# Patient Record
Sex: MALE | Race: WHITE | NOT HISPANIC OR LATINO | Employment: FULL TIME | URBAN - NONMETROPOLITAN AREA
[De-identification: names, ages, dates, MRNs, and addresses within clinical notes are randomized per-mention and may not be internally consistent; named-entity substitution may affect disease eponyms.]

---

## 2019-04-01 ENCOUNTER — NON-PROVIDER VISIT (OUTPATIENT)
Dept: URGENT CARE | Facility: PHYSICIAN GROUP | Age: 39
End: 2019-04-01

## 2019-04-01 DIAGNOSIS — Z02.1 PRE-EMPLOYMENT DRUG SCREENING: ICD-10-CM

## 2019-04-01 LAB
BREATH ALCOHOL COMMENT: NORMAL
POC BREATHALIZER: 0 PERCENT (ref 0–0.01)

## 2019-04-01 PROCEDURE — 82075 ASSAY OF BREATH ETHANOL: CPT | Performed by: PHYSICIAN ASSISTANT

## 2019-04-01 PROCEDURE — 8907 PR URINE COLLECT ONLY: Performed by: PHYSICIAN ASSISTANT

## 2019-12-05 ENCOUNTER — TELEPHONE (OUTPATIENT)
Dept: SCHEDULING | Facility: IMAGING CENTER | Age: 39
End: 2019-12-05

## 2019-12-06 ENCOUNTER — OFFICE VISIT (OUTPATIENT)
Dept: MEDICAL GROUP | Facility: PHYSICIAN GROUP | Age: 39
End: 2019-12-06
Payer: COMMERCIAL

## 2019-12-06 VITALS
HEIGHT: 72 IN | DIASTOLIC BLOOD PRESSURE: 100 MMHG | OXYGEN SATURATION: 99 % | BODY MASS INDEX: 31.15 KG/M2 | TEMPERATURE: 98.2 F | SYSTOLIC BLOOD PRESSURE: 120 MMHG | WEIGHT: 230 LBS | HEART RATE: 77 BPM

## 2019-12-06 DIAGNOSIS — Z00.00 ANNUAL PHYSICAL EXAM: ICD-10-CM

## 2019-12-06 DIAGNOSIS — I82.492 ACUTE DEEP VEIN THROMBOSIS (DVT) OF OTHER SPECIFIED VEIN OF LEFT LOWER EXTREMITY (HCC): ICD-10-CM

## 2019-12-06 DIAGNOSIS — R91.8 MULTIPLE LUNG NODULES ON CT: ICD-10-CM

## 2019-12-06 PROBLEM — F32.A ANXIETY AND DEPRESSION: Status: ACTIVE | Noted: 2019-12-06

## 2019-12-06 PROBLEM — I82.409 DVT (DEEP VENOUS THROMBOSIS) (HCC): Status: ACTIVE | Noted: 2019-12-06

## 2019-12-06 PROBLEM — F41.9 ANXIETY AND DEPRESSION: Status: ACTIVE | Noted: 2019-12-06

## 2019-12-06 PROBLEM — G47.00 INSOMNIA: Status: ACTIVE | Noted: 2019-12-06

## 2019-12-06 PROCEDURE — 99204 OFFICE O/P NEW MOD 45 MIN: CPT | Performed by: NURSE PRACTITIONER

## 2019-12-06 RX ORDER — APIXABAN 5 MG/1
TABLET, FILM COATED ORAL
Refills: 0 | COMMUNITY
Start: 2019-12-03 | End: 2019-12-13 | Stop reason: SINTOL

## 2019-12-06 SDOH — HEALTH STABILITY: MENTAL HEALTH: HOW OFTEN DO YOU HAVE A DRINK CONTAINING ALCOHOL?: MONTHLY OR LESS

## 2019-12-06 ASSESSMENT — PATIENT HEALTH QUESTIONNAIRE - PHQ9
5. POOR APPETITE OR OVEREATING: 1 - SEVERAL DAYS
SUM OF ALL RESPONSES TO PHQ QUESTIONS 1-9: 18
CLINICAL INTERPRETATION OF PHQ2 SCORE: 6

## 2019-12-06 NOTE — LETTER
December 6, 2019       Patient: Robert Dahl   YOB: 1980   Date of Visit: 12/6/2019         To Whom It May Concern:    It is my medical opinion that Robert Dahl return to light duty with the following restrictions: he may not lift over 20 lbs and is to remain light duty until he is seen by the specialist.    If you have any questions or concerns, please don't hesitate to call [phone]          Sincerely,          RYLAND Martell.  Electronically Signed

## 2019-12-07 NOTE — ASSESSMENT & PLAN NOTE
New problem to this provider.  He presented to [facility] in [place], reviewed venous Dopplex US, showed left leg DVT: Partial thrombus in the left superficial femoral vein, near a valve.  Negative findings in the right leg.  Patient was placed on Eliquis 5 mg (10 mg BID x 7 days,  5mg BID x 23 days).  Patient reports tenderness in bilateral legs and left foot metatarsals.  Today patient denies chest pain, coughing, dyspnea, no hematuria, hemoptysis, blood in stool.

## 2019-12-07 NOTE — PROGRESS NOTES
Chief Complaint   Patient presents with   • Establish Care     Pt sts he has pulmorary nodules on his lungs and deep vein thrombosis. He was seen at [facility] on 12/03/2019.       HISTORY OF PRESENT ILLNESS: Patient is a 39 y.o. male, new  patient who presents today to discuss medical problems as listed below:    Health Maintenance:  COMPLETED.    Multiple lung nodules on CT  New problem.  CT of the chest with and without was done at [facility] in [place] to r/o PE. CP and + for DVT.  CT was negative for PE, incidental finding of pulmonary solid nodule in left lower lobe measuring 4.3 and 5 mm which are noncalcified.  Patient denies fevers, night sweats, unplanned weight loss, personal history of malignancy, blood in stool or urine, no coughing, no blood in sputum.  He denies chest pain, chest tightness pressure, no shortness of breath.  Risk factors include smoking half a pack for 5 years, quit smoking in 2014.  Currently waping marijuana.     DVT (deep venous thrombosis) (HCC)  New problem to this provider.  He presented to [facility] in [place], reviewed venous Dopplex US, showed left leg DVT: Partial thrombus in the left superficial femoral vein, near a valve.  Negative findings in the right leg.  Patient was placed on Eliquis 5 mg (10 mg BID x 7 days,  5mg BID x 23 days).  Patient reports tenderness in bilateral legs and left foot metatarsals.  Today patient denies chest pain, coughing, dyspnea, no hematuria, hemoptysis, blood in stool.      Patient Active Problem List    Diagnosis Date Noted   • DVT (deep venous thrombosis) (HCC) 12/06/2019   • Insomnia 12/06/2019   • Anxiety and depression 12/06/2019   • Annual physical exam 12/06/2019   • Multiple lung nodules on CT 12/06/2019        Allergies: Patient has no allergy information on record.    Current Outpatient Medications   Medication Sig Dispense Refill   • ELIQUIS 5 MG Tab   0     No current facility-administered medications for this visit.         Social History     Tobacco Use   • Smoking status: Former Smoker     Packs/day: 0.50     Years: 5.00     Pack years: 2.50     Last attempt to quit: 2014     Years since quittin.0   • Smokeless tobacco: Never Used   • Tobacco comment: quit    Substance Use Topics   • Alcohol use: Yes     Frequency: Monthly or less   • Drug use: Yes     Frequency: 7.0 times per week     Types: Marijuana     Comment: CBD, inhaler     Social History     Patient does not qualify to have social determinant information on file (likely too young).   Social History Narrative   • Not on file       Family History   Problem Relation Age of Onset   • Heart Disease Mother    • Hypertension Mother    • Heart Disease Father    • Diabetes Father        Allergies, past medical history, past surgical history, family history, social history reviewed and updated.    Review of Systems:      - Constitutional: Negative for fever, chills, unexpected weight change, and fatigue/generalized weakness.     - HEENT: Negative for headaches, vision changes, hearing changes, ear pain, ear discharge, rhinorrhea, sinus congestion, sore throat, and neck pain.      - Respiratory: Negative for cough, sputum production, chest congestion, dyspnea, wheezing, and crackles.      - Cardiovascular: Negative for chest pain, palpitations, orthopnea, and bilateral lower extremity edema.     - Gastrointestinal: Negative for heartburn, nausea, vomiting, abdominal pain, hematochezia, melena, diarrhea, constipation, and greasy/foul-smelling stools.     - Genitourinary: Negative for dysuria, polyuria, hematuria, pyuria, urinary urgency, and urinary incontinence.    - Musculoskeletal: Tenderness in bilateral legs and left foot metatarsals.  Negative for myalgias, back pain, and joint pain.     - Skin: Negative for rash, itching, cyanotic skin color change.     - Neurological: Negative for dizziness, tingling, tremors, focal sensory deficit, focal weakness and  headaches.     - Endo/Heme/Allergies: Does not bruise/bleed easily.     - Psychiatric/Behavioral: Negative for depression, suicidal/homicidal ideation and memory loss.      All other systems reviewed and are negative    Exam:    /100 (BP Location: Right arm, Patient Position: Sitting, BP Cuff Size: Large adult)   Pulse 77   Temp 36.8 °C (98.2 °F) (Temporal)   Ht 1.829 m (6')   Wt 104.3 kg (230 lb)   SpO2 99%   BMI 31.19 kg/m²  Body mass index is 31.19 kg/m².    Physical Exam:  Constitutional: Well-developed and well-nourished. Not diaphoretic. No distress.   Skin: Skin is warm and dry. No rash noted.  Head: Atraumatic without lesions.  Eyes: Conjunctivae and extraocular motions are normal. Pupils are equal, round, and reactive to light. No scleral icterus.   Ears:  External ears unremarkable. Tympanic membranes clear and intact.  Nose: Nares patent. Septum midline. Turbinates without erythema nor edema. No discharge.   Mouth/Throat: Dentition is normal. Tongue normal. Oropharynx is clear and moist. Posterior pharynx without erythema or exudates.  Neck: Supple, trachea midline. Normal range of motion. No thyromegaly present. No lymphadenopathy--cervical or supraclavicular.  Cardiovascular: Regular rate and rhythm, S1 and S2 without murmur, rubs, or gallops.    Chest: Effort normal. Clear to auscultation throughout. No adventitious sounds. No CVA tenderness.  Abdomen: Soft, non tender, and without distention. Active bowel sounds in all four quadrants. No rebound, guarding, masses or HSM.  : Negative for dysuria, polyuria, hematuria, pyuria, urinary urgency, and urinary incontinence.  Extremities: No cyanosis, clubbing, erythema, nor edema. Distal pulses intact and symmetric.   Musculoskeletal: All major joints AROM full in all directions without pain.  Neurological: Alert and oriented x 3. DTRs 2+/3 and symmetric. No cranial nerve deficit. 5/5 myotomes. Sensation intact. Negative Rhomberg.  Psychiatric:   Behavior, mood, and affect are appropriate.    Assessment/Plan:  1. Annual physical exam  - CBC WITH DIFFERENTIAL; Future  - Comp Metabolic Panel; Future  - FREE THYROXINE; Future  - Lipid Profile; Future  - HEMOGLOBIN A1C; Future  - VITAMIN D,25 HYDROXY; Future  - TSH; Future    2. Acute deep vein thrombosis (DVT) of other specified vein of left lower extremity (HCC)  Uncontrolled, stable.  Patient to follow-up with vascular anticoagulation clinic for monitoring.  - REFERRAL TO VASCULAR MEDICINE Reason for Referral? Coagulation Disorders  - REFERRAL TO ANTICOAGULATION MONITORING    3. Multiple lung nodules on CT  Patient to follow-up with lung nodule clinic for further monitoring.  - REFERRAL TO LUNG NODULE CLINIC    Discussed with patient possible alternative diagnoses, pt is to take all medications as prescribed. If symptoms persist FU w/PCP, if symptoms worsen go to emergency room. If experiencing any side effects from prescribed medications reports to the office immediately or go to emergency room.  Reviewed indication, dosage, usage and potential adverse effects of prescribed medications. Reviewed risks and benefits of treatment plan. Patient verbalizes understanding of all instruction and verbally agrees to plan.    Return if symptoms worsen or fail to improve.

## 2019-12-07 NOTE — ASSESSMENT & PLAN NOTE
New problem.  CT of the chest with and without was done at [facility] in [place] to r/o PE. CP and + for DVT.  CT was negative for PE, incidental finding of pulmonary solid nodule in left lower lobe measuring 4.3 and 5 mm which are noncalcified.  Patient denies fevers, night sweats, unplanned weight loss, personal history of malignancy, blood in stool or urine, no coughing, no blood in sputum.  He denies chest pain, chest tightness pressure, no shortness of breath.  Risk factors include smoking half a pack for 5 years, quit smoking in 2014.  Currently waping marijuana.

## 2019-12-11 ENCOUNTER — TELEPHONE (OUTPATIENT)
Dept: VASCULAR LAB | Facility: MEDICAL CENTER | Age: 39
End: 2019-12-11

## 2019-12-11 NOTE — TELEPHONE ENCOUNTER
[facility]    Pt reports he is not able to return to work until he is seen by our clinic.  Reports side effects from Eliquis of nausea and vomiting. Our provider will discuss side effects of the medication and see if a transition to a different OAC is appropriate.    Appointment made for 12/13/19    Hima Garnica, PharmD

## 2019-12-13 ENCOUNTER — HOSPITAL ENCOUNTER (OUTPATIENT)
Dept: LAB | Facility: MEDICAL CENTER | Age: 39
End: 2019-12-13
Attending: NURSE PRACTITIONER
Payer: COMMERCIAL

## 2019-12-13 ENCOUNTER — ANTICOAGULATION VISIT (OUTPATIENT)
Dept: MEDICAL GROUP | Facility: PHYSICIAN GROUP | Age: 39
End: 2019-12-13
Payer: COMMERCIAL

## 2019-12-13 VITALS — SYSTOLIC BLOOD PRESSURE: 150 MMHG | DIASTOLIC BLOOD PRESSURE: 87 MMHG | HEART RATE: 80 BPM

## 2019-12-13 DIAGNOSIS — I82.492 ACUTE DEEP VEIN THROMBOSIS (DVT) OF OTHER SPECIFIED VEIN OF LEFT LOWER EXTREMITY (HCC): ICD-10-CM

## 2019-12-13 DIAGNOSIS — Z00.00 ANNUAL PHYSICAL EXAM: ICD-10-CM

## 2019-12-13 LAB
ALBUMIN SERPL BCP-MCNC: 4.4 G/DL (ref 3.2–4.9)
ALBUMIN/GLOB SERPL: 1.7 G/DL
ALP SERPL-CCNC: 71 U/L (ref 30–99)
ALT SERPL-CCNC: 52 U/L (ref 2–50)
ANION GAP SERPL CALC-SCNC: 7 MMOL/L (ref 0–11.9)
AST SERPL-CCNC: 28 U/L (ref 12–45)
BASOPHILS # BLD AUTO: 0.8 % (ref 0–1.8)
BASOPHILS # BLD: 0.07 K/UL (ref 0–0.12)
BILIRUB SERPL-MCNC: 0.5 MG/DL (ref 0.1–1.5)
BUN SERPL-MCNC: 9 MG/DL (ref 8–22)
CALCIUM SERPL-MCNC: 9.3 MG/DL (ref 8.5–10.5)
CHLORIDE SERPL-SCNC: 103 MMOL/L (ref 96–112)
CHOLEST SERPL-MCNC: 172 MG/DL (ref 100–199)
CO2 SERPL-SCNC: 30 MMOL/L (ref 20–33)
CREAT SERPL-MCNC: 1.02 MG/DL (ref 0.5–1.4)
EOSINOPHIL # BLD AUTO: 0.1 K/UL (ref 0–0.51)
EOSINOPHIL NFR BLD: 1.1 % (ref 0–6.9)
ERYTHROCYTE [DISTWIDTH] IN BLOOD BY AUTOMATED COUNT: 43.5 FL (ref 35.9–50)
FASTING STATUS PATIENT QL REPORTED: NORMAL
GLOBULIN SER CALC-MCNC: 2.6 G/DL (ref 1.9–3.5)
GLUCOSE BLD-MCNC: 86 MG/DL (ref 70–100)
GLUCOSE SERPL-MCNC: 84 MG/DL (ref 65–99)
HCT VFR BLD AUTO: 52.5 % (ref 42–52)
HDLC SERPL-MCNC: 30 MG/DL
HGB BLD-MCNC: 18 G/DL (ref 14–18)
IMM GRANULOCYTES # BLD AUTO: 0.04 K/UL (ref 0–0.11)
IMM GRANULOCYTES NFR BLD AUTO: 0.4 % (ref 0–0.9)
LDLC SERPL CALC-MCNC: ABNORMAL MG/DL
LYMPHOCYTES # BLD AUTO: 2.49 K/UL (ref 1–4.8)
LYMPHOCYTES NFR BLD: 27.7 % (ref 22–41)
MCH RBC QN AUTO: 31.9 PG (ref 27–33)
MCHC RBC AUTO-ENTMCNC: 34.3 G/DL (ref 33.7–35.3)
MCV RBC AUTO: 92.9 FL (ref 81.4–97.8)
MONOCYTES # BLD AUTO: 0.82 K/UL (ref 0–0.85)
MONOCYTES NFR BLD AUTO: 9.1 % (ref 0–13.4)
NEUTROPHILS # BLD AUTO: 5.47 K/UL (ref 1.82–7.42)
NEUTROPHILS NFR BLD: 60.9 % (ref 44–72)
NRBC # BLD AUTO: 0 K/UL
NRBC BLD-RTO: 0 /100 WBC
PLATELET # BLD AUTO: 298 K/UL (ref 164–446)
PMV BLD AUTO: 10.2 FL (ref 9–12.9)
POTASSIUM SERPL-SCNC: 3.8 MMOL/L (ref 3.6–5.5)
PROT SERPL-MCNC: 7 G/DL (ref 6–8.2)
RBC # BLD AUTO: 5.65 M/UL (ref 4.7–6.1)
SODIUM SERPL-SCNC: 140 MMOL/L (ref 135–145)
TRIGL SERPL-MCNC: 417 MG/DL (ref 0–149)
WBC # BLD AUTO: 9 K/UL (ref 4.8–10.8)

## 2019-12-13 PROCEDURE — 36415 COLL VENOUS BLD VENIPUNCTURE: CPT

## 2019-12-13 PROCEDURE — 82962 GLUCOSE BLOOD TEST: CPT | Performed by: NURSE PRACTITIONER

## 2019-12-13 PROCEDURE — 80061 LIPID PANEL: CPT

## 2019-12-13 PROCEDURE — 82306 VITAMIN D 25 HYDROXY: CPT

## 2019-12-13 PROCEDURE — 84439 ASSAY OF FREE THYROXINE: CPT

## 2019-12-13 PROCEDURE — 85025 COMPLETE CBC W/AUTO DIFF WBC: CPT

## 2019-12-13 PROCEDURE — 80053 COMPREHEN METABOLIC PANEL: CPT

## 2019-12-13 PROCEDURE — 84443 ASSAY THYROID STIM HORMONE: CPT

## 2019-12-13 PROCEDURE — 83036 HEMOGLOBIN GLYCOSYLATED A1C: CPT

## 2019-12-13 PROCEDURE — 99211 OFF/OP EST MAY X REQ PHY/QHP: CPT | Performed by: NURSE PRACTITIONER

## 2019-12-13 NOTE — Clinical Note
I'm asking for forgiveness on this, but I didn't know what to do for this patient and what to do regarding his symptoms. See my note. I saw it happen in the office.  Epic came up at the end of my visit with him, however this felt way above my comfort level. Please forgive me for booking him into your schedule.

## 2019-12-14 LAB
25(OH)D3 SERPL-MCNC: 16 NG/ML (ref 30–100)
T4 FREE SERPL-MCNC: 0.87 NG/DL (ref 0.53–1.43)
TSH SERPL DL<=0.005 MIU/L-ACNC: 3.76 UIU/ML (ref 0.38–5.33)

## 2019-12-14 NOTE — PROGRESS NOTES
"LATE ENTRY due to Epic being down.        Target end date:TBD     Indication: DVT     Drug: Started on Eliquis 10mg BID on 12/3/19, stepped down to 5mg BID on    12/11/19 12/03/19 - [facility] Records        Pt states no family hx VTE. Denies any surgery, accidents, or prolonged immobility that may have caused DVT. Denies long car rides or flights. Pt works as a heavy  out at the mines.     Pt states that the Eliquis is giving him \"side effects\". I was able to witness these side effects in office. Pt started to turn red and hot, starting with the extremities and then evolved to all over. Pt had tremor in his hand. Then as his flushing was continuing to spread he started to get nausea. Checked BP during this episode manually it was ~150/87 p=80. Episode lasted about 10 minutes, pt sat with his head down in the recovery position and then his color turned back normal, felt normal. Declined to be seen by Urgent Care. Pt did not vomit or dry heave in office, however he states that does normally happen and that this episode was \"mild\".     States that he got sent home from work after he returned on light duty, was sweeping the floor, had any episode and sat down and passed out. Also reports getting pain in his legs and chest.     Check BG = 86 in office during episode. Pt had gone to the lab prior to my appt, but as the system was down there was nothing pending. Appears that PCP ordered baseline labs.     Adherence with DOAC Therapy   Pt has NOT missed any doses in the average week    Bleeding Risk Assessment     Denies -  Epistaxis   Pt denies any excessive or unusual bleeding/hematomas.  Pt denies any GI bleeds or hematemesis.  Pt denies any concerning daily headache or sub dural hematoma symptoms.     Pt denies any hematuria or abnormal vaginal bleeding.   Latest Hemoglobin - pending   ETOH overuse - denies     Creatinine Clearance/Renal Function     Latest ClCr - pending    Hepatic " function   Latest LFTs - pending   Pt denies any history of liver dysfunction      Drug Interactions   Platelets: - pending   ASA/other antiplatelets - pending   NSAID - no    Other drug interactions - no   Verified no anticonvulsant or azole therapy, education provided for future use.     Examination  Vitals:    12/13/19 1624   BP: 150/87   Pulse: 80       Symptomatic hypotension - no   Significant gait impairment/imbalance/high risk for falls? no    Final Assessment and Recommendations:   Patient appears stable from the anticoagulation staindpoint.     Benefits of continued DOAC therapy outweigh risks for this patient   Recommend pt continue with current DOAC therapy    Long discussion with pt and wife. Pt lives far out, and thus the monitoring for warfarin would be very burden some, and pt DOES NOT like finger pokes. Pt got worked up in office about me poking him for BG level. He states that he has always had weird side effects from medications, and that when working with a behavioral therapist in the past he tried numerous medications for his anxiety but never found one that didn't cause him issues.     Will STOP Eliquis.     Pt and wife prefer for pt to go on lovenox. As this visit is on a Friday late afternoon, I am concerned about stock issues at the pharmacy or coverage issues. Pt has no issues with self injection. I ordered lovenox 100mg syringes to be injected every 12 hours. As a back up plan I also gave pt some Xarelto 15mg samples. Advised that he can start the lovenox injections if he's able to get them and if not then to start the Xarelto 15mg BID. DO NOT USE BOTH.     Follow up:      Due to his observed episode in clinic, and from the previous episodes pt has described I will have him f/u with Dr. Bloch on Monday for further evaluation and workup.     Ashley Martinez, PharmD

## 2019-12-16 ENCOUNTER — OFFICE VISIT (OUTPATIENT)
Dept: VASCULAR LAB | Facility: MEDICAL CENTER | Age: 39
End: 2019-12-16
Attending: INTERNAL MEDICINE
Payer: COMMERCIAL

## 2019-12-16 VITALS
BODY MASS INDEX: 31.21 KG/M2 | WEIGHT: 230.4 LBS | SYSTOLIC BLOOD PRESSURE: 134 MMHG | DIASTOLIC BLOOD PRESSURE: 95 MMHG | HEART RATE: 91 BPM | HEIGHT: 72 IN

## 2019-12-16 DIAGNOSIS — R06.09 DOE (DYSPNEA ON EXERTION): ICD-10-CM

## 2019-12-16 DIAGNOSIS — I82.492 ACUTE DEEP VEIN THROMBOSIS (DVT) OF OTHER SPECIFIED VEIN OF LEFT LOWER EXTREMITY (HCC): ICD-10-CM

## 2019-12-16 DIAGNOSIS — R91.1 PULMONARY NODULE: ICD-10-CM

## 2019-12-16 PROCEDURE — 99203 OFFICE O/P NEW LOW 30 MIN: CPT | Performed by: INTERNAL MEDICINE

## 2019-12-16 PROCEDURE — 99212 OFFICE O/P EST SF 10 MIN: CPT

## 2019-12-16 ASSESSMENT — ENCOUNTER SYMPTOMS
DIARRHEA: 0
FOCAL WEAKNESS: 0
CONSTIPATION: 0
SEIZURES: 0
DOUBLE VISION: 0
MYALGIAS: 0
NAUSEA: 1
HEADACHES: 0
SPEECH CHANGE: 0
BLOOD IN STOOL: 0
SENSORY CHANGE: 0
DEPRESSION: 0
FEVER: 0
PALPITATIONS: 0
COUGH: 1
SPUTUM PRODUCTION: 0
WEIGHT LOSS: 0
NERVOUS/ANXIOUS: 1
BACK PAIN: 1
BLURRED VISION: 0
SHORTNESS OF BREATH: 1
DIZZINESS: 1

## 2019-12-16 NOTE — PROGRESS NOTES
VASCULAR ANTI-COAGULATION VISIT  Subjective:   Robert Dahl is a 39 y.o. male who presents today 2019 for   Chief Complaint   Patient presents with   • Follow-Up     HPI:  Patient here for evaluation management of DVT and possible side effects to his anticoagulant  Patient has moved here from [place] in the last few months.  A couple of weeks ago he went to the emergency room with a constellation of symptoms that included fatigue, shortness of breath, bilateral hand and foot swelling, nauseousness, and increased anxiety.  As part of his work-up he had an ultrasound that was suggestive of DVT.  He also had a CT scan that showed a couple of pulmonary nodules.  He was started on Eliquis.  Last week he came in to the anticoagulation clinic and was complaining of possible side effects to Eliquis which included episodic full-body erythema with pruritus.  He was taken off Eliquis and started on low molecular weight heparin.  He has been adherent with his shots.  He has had no further rash.  His symptoms of nausea, shortness of breath, fatigue are unchanged.  He has a long history of an anxiety disorder.  He has not tolerated anti-anxiolytics well.  He states that he has had episodic bilateral hand and foot swelling but is been no worse on one side than the other.  No known history of cancer.  No recent trauma.  No long recent travel.  No family history of DVT.  He does state that a few months ago he did an injection of testosterone and possible anabolic steroid, but has not repeated those injections    Past medical history -anxiety disorder    Past surgical history -no cardiovascular vascular surgeries    Family history -no history of heart disease or DVT    Social History     Tobacco Use   • Smoking status: Former Smoker     Packs/day: 0.50     Years: 5.00     Pack years: 2.50     Last attempt to quit: 2014     Years since quittin.0   • Smokeless tobacco: Never Used   • Tobacco comment: quit   "  Substance Use Topics   • Alcohol use: Yes     Frequency: Monthly or less   • Drug use: Yes     Frequency: 7.0 times per week     Types: Marijuana     Comment: CBD, inhaler     SOCIAL HISTORY/DIET AND EXERCISE:  Weight Change: Stable  Diet: common adult  Exercise: Active at work     Family History   Mom - CHF  Dad - CHF, dm, AAA,      Review of Systems   Constitutional: Positive for malaise/fatigue. Negative for fever and weight loss.   HENT: Negative for hearing loss and tinnitus.    Eyes: Negative for blurred vision and double vision.   Respiratory: Positive for cough and shortness of breath. Negative for sputum production.    Cardiovascular: Positive for chest pain and leg swelling. Negative for palpitations.   Gastrointestinal: Positive for nausea. Negative for blood in stool, constipation and diarrhea.   Genitourinary: Negative.    Musculoskeletal: Positive for back pain. Negative for joint pain and myalgias.   Skin: Positive for rash. Negative for itching.   Neurological: Positive for dizziness. Negative for sensory change, speech change, focal weakness, seizures and headaches.   Psychiatric/Behavioral: Negative for depression. The patient is nervous/anxious.       Objective:     Vitals:    12/16/19 1333 12/16/19 1337   BP: 141/85 134/95   BP Location: Left arm Left arm   Patient Position: Sitting Sitting   BP Cuff Size: Adult Adult   Pulse: 89 91   Weight: 104.5 kg (230 lb 6.4 oz)    Height: 1.82 m (5' 11.65\")      Body mass index is 31.55 kg/m².  Physical Exam  Vitals signs reviewed.   Constitutional:       General: He is not in acute distress.     Appearance: He is well-developed. He is not diaphoretic.   HENT:      Head: Normocephalic and atraumatic.   Eyes:      General: No scleral icterus.     Conjunctiva/sclera: Conjunctivae normal.      Pupils: Pupils are equal, round, and reactive to light.   Neck:      Musculoskeletal: Normal range of motion and neck supple.      Thyroid: No thyromegaly.      " Vascular: No JVD.   Cardiovascular:      Rate and Rhythm: Normal rate and regular rhythm.      Heart sounds: Normal heart sounds. No murmur. No friction rub. No gallop.    Pulmonary:      Effort: No respiratory distress.      Breath sounds: Normal breath sounds. No wheezing or rales.   Abdominal:      General: Bowel sounds are normal. There is no distension.      Palpations: Abdomen is soft. There is no mass.      Tenderness: There is no tenderness. There is no rebound.   Musculoskeletal: Normal range of motion.         General: No tenderness.   Skin:     General: Skin is warm and dry.      Findings: No rash.   Neurological:      Mental Status: He is alert and oriented to person, place, and time.      Cranial Nerves: No cranial nerve deficit.      Coordination: Coordination normal.   Psychiatric:         Behavior: Behavior normal.       Lab Results   Component Value Date    CHOLSTRLTOT 172 12/13/2019    LDL see below 12/13/2019    HDL 30 (A) 12/13/2019    TRIGLYCERIDE 417 (H) 12/13/2019           Lab Results   Component Value Date    SODIUM 140 12/13/2019    POTASSIUM 3.8 12/13/2019    CHLORIDE 103 12/13/2019    CO2 30 12/13/2019    GLUCOSE 84 12/13/2019    BUN 9 12/13/2019    CREATININE 1.02 12/13/2019        Lab Results   Component Value Date    WBC 9.0 12/13/2019    RBC 5.65 12/13/2019    HEMOGLOBIN 18.0 12/13/2019    HEMATOCRIT 52.5 (H) 12/13/2019    MCV 92.9 12/13/2019    MCH 31.9 12/13/2019    MCHC 34.3 12/13/2019    MPV 10.2 12/13/2019      CT scan December 3, 2019 at [facility]  No significant atherosclerotic calcification  No pulmonary emboli  No mediastinal or hilar adenopathy  Left lobe subpleural noncalcified 4.3 mm and 5.0 mm solid nodules    Venous duplex December 30, 2019 at [facility]  Acute partial thrombus in the left femoral vein    Medical Decision Making:  Today's Assessment / Status / Plan:     1. Acute deep vein thrombosis (DVT) of other specified vein of left lower extremity (HCC)   "EC-ECHOCARDIOGRAM COMPLETE W/O CONT    LOWER EXTREMITY VENOUS DUPLEX    CBC WITHOUT DIFFERENTIAL    Comp Metabolic Panel    proBrain Natriuretic Peptide, NT    D-DIMER    US-EXTREMITY VENOUS LOWER UNILAT LEFT   2. POP (dyspnea on exertion)  EC-ECHOCARDIOGRAM COMPLETE W/O CONT    LOWER EXTREMITY VENOUS DUPLEX    CBC WITHOUT DIFFERENTIAL    Comp Metabolic Panel    proBrain Natriuretic Peptide, NT    D-DIMER   3. Pulmonary nodule  REFERRAL TO PULMONOLOGY     Indication for anticoagulation: DVT found on imaging at [facility] -as this was described as \"partial\" it is difficult to tell if this is acute or chronic.  His symptoms certainly not consistent with acute unilateral DVT    Anti-Platelet/Anti-Coagulant Tx:   We discussed the risks and benefits of continuing anticoagulation.  As I explained to patient if there is even a chance that this represents acute DVT the benefit of anticoagulation outweighs the risk of bleeding.  Patient and his wife are willing to continue anticoagulation at this point and they do understand there is risk of bleeding  Possible allergic reaction to Eliquis  He has tolerated Lovenox, but obviously expensive and inconvenient    Anti-Coagulation Plan:  -Trial of Xarelto 20 mg daily  -Recheck venous duplex and d-dimer  -Will likely treat for 3 months if it is well-tolerated, but depending on shared decision making if his d-dimer is normal and his repeat venous duplex is not consistent with acute DVT we could consider changing to low-dose aspirin at next visit    Smoking: Continue with complete avoidance    Physical Activity: Stay off work pending further evaluation below    Weight Management and Nutrition: Recommended overall heart healthy eating plan    Constellation of symptoms including shortness of breath and fatigue -as explained to the patient the differential diagnosis is quite wide.  Many of the symptoms may simply be due to anxiety.  We should rule out structural heart disease.  It " sounds like there is least the possibility that he may have had electrolyte imbalance in the emergency room and that should be followed up on  -Check echocardiogram  -Referral to pulmonary  -Recheck BNP, CBC, CMP  -Otherwise defer further work-up and management to PCP    Pulmonary nodules-referral to pulmonary for further evaluation and management    Anxiety disorder -defer further management to PCP    Instructed to follow-up with PCP for remainder of adult medical needs: yes  We will partner with other provider in the management of established vascular disease and cardiometabolic risk factors    Studies to Be Obtained: Echocardiogram  Labs to Be Obtained: As above prior to next visit    Follow up in: 3 weeks    Michael J Bloch, M.D.    CC:   EMILY Martell

## 2019-12-17 ENCOUNTER — TELEPHONE (OUTPATIENT)
Dept: MEDICAL GROUP | Facility: PHYSICIAN GROUP | Age: 39
End: 2019-12-17

## 2019-12-17 LAB
EST. AVERAGE GLUCOSE BLD GHB EST-MCNC: 103 MG/DL
HBA1C MFR BLD: 5.2 % (ref 0–5.6)

## 2019-12-17 NOTE — TELEPHONE ENCOUNTER
----- Message from EMILY Martell sent at 12/17/2019 10:32 AM PST -----  Please let patient know I reviewed his recent labs and would like to discuss the findings in the office. Please schedule an appointment with pt at his convenience.

## 2019-12-17 NOTE — NON-PROVIDER
Patient transitioning to Xarelto from Lovenox per Dr. Bloch,    Pts last dose of Lovenox this morning. Advised pt to begin Xarelto 15 mg BID x 21 days then Xaretlto 20 mg once daily with food.     Discussed side effects, what to do if missed dose and importance of medication     Provided Xarelto 15 mg samples to pt for 21 days then Xarelto 20 mg rx for future.     Pt has f/u 1/10/19    Willa Hernandez, SarkisD

## 2019-12-20 ENCOUNTER — OFFICE VISIT (OUTPATIENT)
Dept: MEDICAL GROUP | Facility: PHYSICIAN GROUP | Age: 39
End: 2019-12-20
Payer: COMMERCIAL

## 2019-12-20 VITALS
HEART RATE: 71 BPM | BODY MASS INDEX: 31.02 KG/M2 | DIASTOLIC BLOOD PRESSURE: 84 MMHG | TEMPERATURE: 98.6 F | OXYGEN SATURATION: 97 % | HEIGHT: 72 IN | SYSTOLIC BLOOD PRESSURE: 136 MMHG | WEIGHT: 229 LBS

## 2019-12-20 DIAGNOSIS — F41.9 ANXIETY AND DEPRESSION: ICD-10-CM

## 2019-12-20 DIAGNOSIS — E78.1 HIGH TRIGLYCERIDES: ICD-10-CM

## 2019-12-20 DIAGNOSIS — F32.A ANXIETY AND DEPRESSION: ICD-10-CM

## 2019-12-20 DIAGNOSIS — E55.9 VITAMIN D DEFICIENCY: ICD-10-CM

## 2019-12-20 PROCEDURE — 99214 OFFICE O/P EST MOD 30 MIN: CPT | Performed by: NURSE PRACTITIONER

## 2019-12-20 RX ORDER — DULOXETIN HYDROCHLORIDE 30 MG/1
30 CAPSULE, DELAYED RELEASE ORAL DAILY
COMMUNITY
End: 2019-12-20 | Stop reason: SDUPTHER

## 2019-12-20 RX ORDER — DULOXETIN HYDROCHLORIDE 30 MG/1
30 CAPSULE, DELAYED RELEASE ORAL DAILY
Qty: 90 CAP | Refills: 1 | Status: SHIPPED | OUTPATIENT
Start: 2019-12-20 | End: 2020-07-06

## 2019-12-20 RX ORDER — ERGOCALCIFEROL 1.25 MG/1
50000 CAPSULE ORAL
Qty: 12 CAP | Refills: 3 | Status: SHIPPED | OUTPATIENT
Start: 2019-12-20 | End: 2020-09-04

## 2019-12-20 ASSESSMENT — PATIENT HEALTH QUESTIONNAIRE - PHQ9
5. POOR APPETITE OR OVEREATING: 2 - MORE THAN HALF THE DAYS
SUM OF ALL RESPONSES TO PHQ QUESTIONS 1-9: 12
CLINICAL INTERPRETATION OF PHQ2 SCORE: 6

## 2019-12-21 NOTE — PROGRESS NOTES
Chief Complaint   Patient presents with   • Results     labs       HISTORY OF PRESENT ILLNESS: Patient is a 39 y.o. male, established patient who presents today to discuss medical problems as listed below:    Anxiety and depression  New problem.  Patient reports anxiety and depression which he was diagnosed couple of years ago, was previously on Cymbalta 30 mg which worked very well.  He stopped 8 months ago when he felt much better.  And now, after recent move for work he is anxiety peaked and he restarted leftover from previous Rx , duloxetine 30 mg 1 month ago.  Today he is requesting a refill on Cymbalta.  His PHQ scale today is 12, which is moderate depression.  He denies suicidal homicidal ideations.  He states he is feeling much better.  He is a recent diagnosis with DVT also contributed to his stress.  He has supportive systems and his wife and family.    High triglycerides  Reviewed recent labs, noted hydrochlorothiazide for 17 low associated HDL at 30.  Patient denies chest pain, dyspnea, nausea, dizziness, cardio palpitations.  He was recently diagnosed with DVT and placed on rivaroxaban 20 mg.  He has a scheduled follow-up with vascular medicine on 1/10/2020.   Ref. Range 4/1/2019 14:02 12/13/2019 14:10 12/13/2019 16:15 12/13/2019 16:54   Cholesterol,Tot Latest Ref Range: 100 - 199 mg/dL  172     Triglycerides Latest Ref Range: 0 - 149 mg/dL  417 (H)     HDL Latest Ref Range: >=40 mg/dL  30 (A)     LDL Latest Ref Range: <100 mg/dL  see below         Vitamin D deficiency  Reviewed recent labs, noted low vitamin D at 16.  Patient is not on a supplement.  He does have diagnoses of anxiety and depression.      Patient Active Problem List    Diagnosis Date Noted   • High triglycerides 12/20/2019   • Vitamin D deficiency 12/20/2019   • DVT (deep venous thrombosis) (HCC) 12/06/2019   • Insomnia 12/06/2019   • Anxiety and depression 12/06/2019   • Annual physical exam 12/06/2019   • Multiple lung nodules on CT  2019        Allergies: Eliquis [apixaban]    Current Outpatient Medications   Medication Sig Dispense Refill   • vitamin D, Ergocalciferol, (DRISDOL) 13403 units Cap capsule Take 1 Cap by mouth every 7 days. 12 Cap 3   • DULoxetine (CYMBALTA) 30 MG Cap DR Particles Take 1 Cap by mouth every day. 90 Cap 1   • rivaroxaban (XARELTO) 20 MG Tab tablet Take 1 Tab by mouth with dinner. 30 Tab 2     No current facility-administered medications for this visit.        Social History     Tobacco Use   • Smoking status: Former Smoker     Packs/day: 0.50     Years: 5.00     Pack years: 2.50     Last attempt to quit: 2014     Years since quittin.0   • Smokeless tobacco: Never Used   • Tobacco comment: quit    Substance Use Topics   • Alcohol use: Yes     Frequency: Monthly or less   • Drug use: Yes     Frequency: 7.0 times per week     Types: Marijuana     Comment: CBD, inhaler     Social History     Patient does not qualify to have social determinant information on file (likely too young).   Social History Narrative   • Not on file       Family History   Problem Relation Age of Onset   • Heart Disease Mother    • Hypertension Mother    • Heart Disease Father    • Diabetes Father        Allergies, past medical history, past surgical history, family history, social history reviewed and updated.    Review of Systems:      - Constitutional: Negative for fever, chills, unexpected weight change, and fatigue/generalized weakness.    - Respiratory: Negative for cough, sputum production, chest congestion, dyspnea, wheezing, and crackles.      - Cardiovascular: Negative for chest pain, palpitations, orthopnea, and bilateral lower extremity edema.     - Endo/Heme/Allergies: Denies bruising.    - Psychiatric/Behavioral: positive for anxiety and depression, no suicidal/homicidal ideation and memory loss.      All other systems reviewed and are negative    Exam:    /84 (BP Location: Right arm, Patient Position:  Sitting, BP Cuff Size: Adult)   Pulse 71   Temp 37 °C (98.6 °F) (Temporal)   Ht 1.829 m (6')   Wt 103.9 kg (229 lb)   SpO2 97%   BMI 31.06 kg/m²  Body mass index is 31.06 kg/m².    Physical Exam:  Constitutional: Well-developed and well-nourished. Not diaphoretic. No distress.   Cardiovascular: Regular rate and rhythm, S1 and S2 without murmur, rubs, or gallops.    Chest: Effort normal. Clear to auscultation throughout. No adventitious sounds.   Psychiatric:  Behavior, mood, and affect are appropriate.    LABS: 12/13/19  results reviewed and discussed with the patient, questions answered.    Patient was seen for 25 minutes face to face of which > 50% of appointment time was spent on counseling and coordination of care regarding the above.     Assessment/Plan:  1. Vitamin D deficiency  Uncontrolled.  Educated on vitamin D deficiency etiology and associated risks.  After Rx completion, take vitamin D3, 5000 IUs daily.  - vitamin D, Ergocalciferol, (DRISDOL) 33029 units Cap capsule; Take 1 Cap by mouth every 7 days.  Dispense: 12 Cap; Refill: 3    2. High triglycerides  Uncontrolled.  Patient educated on risks of uncontrolled glycerides.  Patient educated on lifestyle as the first-line therapy.  Advised to avoid simple carbohydrates which includes sugary drinks, sweets in excess of breads, pastas and rice.  Advised daily fiber and eat fibrous foods, also recommend OTC fish oil supplement daily.  Also discussed the benefits of exercise.  Discussed smoking cessation and stress control.  Patient would like to start with lifestyle first, will recheck lipids in 6 weeks.  If no changes will start antilipid therapy.  - Lipid Profile; Future    3. Anxiety and depression  Stable.  Prescription refilled.  Patient declines referral to behavioral health today.  Patient states he is feeling much better since he restarted his usual medication and dose.  He denies any suicidal or homicidal ideations.  Patient was provided  contact information such as national suicide line.  In case of emergencies patient advised to call 911 or contact [facility] or go to [facility] ED.    Discussed with patient possible alternative diagnoses, pt is to take all medications as prescribed. If symptoms persist FU w/PCP, if symptoms worsen go to emergency room. If experiencing any side effects from prescribed medications reports to the office immediately or go to emergency room.  Reviewed indication, dosage, usage and potential adverse effects of prescribed medications. Reviewed risks and benefits of treatment plan. Patient verbalizes understanding of all instruction and verbally agrees to plan.    Return in about 6 weeks (around 1/31/2020).

## 2019-12-21 NOTE — ASSESSMENT & PLAN NOTE
Reviewed recent labs, noted hydrochlorothiazide for 17 low associated HDL at 30.  Patient denies chest pain, dyspnea, nausea, dizziness, cardio palpitations.  He was recently diagnosed with DVT and placed on rivaroxaban 20 mg.  He has a scheduled follow-up with vascular medicine on 1/10/2020.   Ref. Range 4/1/2019 14:02 12/13/2019 14:10 12/13/2019 16:15 12/13/2019 16:54   Cholesterol,Tot Latest Ref Range: 100 - 199 mg/dL  172     Triglycerides Latest Ref Range: 0 - 149 mg/dL  417 (H)     HDL Latest Ref Range: >=40 mg/dL  30 (A)     LDL Latest Ref Range: <100 mg/dL  see below

## 2019-12-21 NOTE — ASSESSMENT & PLAN NOTE
Reviewed recent labs, noted low vitamin D at 16.  Patient is not on a supplement.  He does have diagnoses of anxiety and depression.

## 2019-12-21 NOTE — ASSESSMENT & PLAN NOTE
New problem.  Patient reports anxiety and depression which he was diagnosed couple of years ago, was previously on Cymbalta 30 mg which worked very well.  He stopped 8 months ago when he felt much better.  And now, after recent move for work he is anxiety peaked and he restarted leftover from previous Rx , duloxetine 30 mg 1 month ago.  Today he is requesting a refill on Cymbalta.  His PHQ scale today is 12, which is moderate depression.  He denies suicidal homicidal ideations.  He states he is feeling much better.  He is a recent diagnosis with DVT also contributed to his stress.  He has supportive systems and his wife and family.

## 2019-12-26 DIAGNOSIS — E78.1 HIGH TRIGLYCERIDES: ICD-10-CM

## 2019-12-26 RX ORDER — ROSUVASTATIN CALCIUM 10 MG/1
10 TABLET, COATED ORAL EVERY EVENING
Qty: 30 TAB | Refills: 1 | Status: SHIPPED | OUTPATIENT
Start: 2019-12-26 | End: 2020-02-25

## 2019-12-26 NOTE — PROGRESS NOTES
Called pt, notified of a plan to start on Crestor 10 mg every evening.  Also complete lab draw for direct LDL a few days prior to seeing vascular on 1/10/2020.  Discussed possible side effects of new medication.  Experiencing any symptoms report to the office or go to ED.  Will check lipid panel in 6 weeks.  All questions answered, patient verbalizes understanding.

## 2020-01-03 ENCOUNTER — HOSPITAL ENCOUNTER (OUTPATIENT)
Dept: RADIOLOGY | Facility: MEDICAL CENTER | Age: 40
End: 2020-01-03
Attending: INTERNAL MEDICINE
Payer: COMMERCIAL

## 2020-01-03 ENCOUNTER — HOSPITAL ENCOUNTER (OUTPATIENT)
Dept: CARDIOLOGY | Facility: MEDICAL CENTER | Age: 40
End: 2020-01-03
Attending: INTERNAL MEDICINE
Payer: COMMERCIAL

## 2020-01-03 DIAGNOSIS — I82.492 ACUTE DEEP VEIN THROMBOSIS (DVT) OF OTHER SPECIFIED VEIN OF LEFT LOWER EXTREMITY (HCC): ICD-10-CM

## 2020-01-03 DIAGNOSIS — R06.09 DOE (DYSPNEA ON EXERTION): ICD-10-CM

## 2020-01-03 LAB
LV EJECT FRACT  99904: 55
LV EJECT FRACT MOD 2C 99903: 51.02
LV EJECT FRACT MOD 4C 99902: 47.51
LV EJECT FRACT MOD BP 99901: 47.24

## 2020-01-03 PROCEDURE — 93306 TTE W/DOPPLER COMPLETE: CPT

## 2020-01-03 PROCEDURE — 93306 TTE W/DOPPLER COMPLETE: CPT | Mod: 26 | Performed by: INTERNAL MEDICINE

## 2020-01-03 PROCEDURE — 93971 EXTREMITY STUDY: CPT | Mod: 26,LT | Performed by: INTERNAL MEDICINE

## 2020-01-03 PROCEDURE — 93971 EXTREMITY STUDY: CPT | Mod: LT

## 2020-01-10 ENCOUNTER — OFFICE VISIT (OUTPATIENT)
Dept: VASCULAR LAB | Facility: MEDICAL CENTER | Age: 40
End: 2020-01-10
Attending: INTERNAL MEDICINE
Payer: COMMERCIAL

## 2020-01-10 VITALS
SYSTOLIC BLOOD PRESSURE: 144 MMHG | HEART RATE: 76 BPM | BODY MASS INDEX: 31.56 KG/M2 | HEIGHT: 72 IN | WEIGHT: 233 LBS | DIASTOLIC BLOOD PRESSURE: 101 MMHG

## 2020-01-10 DIAGNOSIS — I82.492 ACUTE DEEP VEIN THROMBOSIS (DVT) OF OTHER SPECIFIED VEIN OF LEFT LOWER EXTREMITY (HCC): ICD-10-CM

## 2020-01-10 PROCEDURE — 99212 OFFICE O/P EST SF 10 MIN: CPT | Performed by: NURSE PRACTITIONER

## 2020-01-10 PROCEDURE — 99214 OFFICE O/P EST MOD 30 MIN: CPT | Performed by: NURSE PRACTITIONER

## 2020-01-10 ASSESSMENT — ENCOUNTER SYMPTOMS
DOUBLE VISION: 0
BLURRED VISION: 0
SENSORY CHANGE: 0
DEPRESSION: 0
PALPITATIONS: 0
SPUTUM PRODUCTION: 0
FOCAL WEAKNESS: 0
SEIZURES: 0
WEIGHT LOSS: 0
BLOOD IN STOOL: 0
DIZZINESS: 1
BACK PAIN: 1
SHORTNESS OF BREATH: 1
COUGH: 1
SPEECH CHANGE: 0
MYALGIAS: 0
HEADACHES: 0
FEVER: 0
NAUSEA: 1
NERVOUS/ANXIOUS: 1
CONSTIPATION: 0
DIARRHEA: 0

## 2020-01-10 NOTE — PROGRESS NOTES
VASCULAR ANTI-COAGULATION VISIT  Subjective:   Robert Dahl is a 39 y.o. male who presents today 01/10/2020 for   Chief Complaint   Patient presents with   • Follow-Up     HPI:  Patient here for evaluation management of DVT, possible side effects to his anticoagulant and continued need for anticoagulation.    Repeat ultrasound done at beginning of January.  Tolerating Xarelto   No symptoms of allergic reaction  No bleeding problems  Denies any further CP, SOB or palpitations  Established with a PCP who is addressing cholesterol and anxiety disorder  No recent trauma, long distance travel, or family hx of DVT.  Minimal LE swelling  No s/s of recurrent DVT  Concerned about returning to work    Social History     Tobacco Use   • Smoking status: Former Smoker     Packs/day: 0.50     Years: 5.00     Pack years: 2.50     Last attempt to quit: 2014     Years since quittin.1   • Smokeless tobacco: Never Used   • Tobacco comment: quit    Substance Use Topics   • Alcohol use: Yes     Frequency: Monthly or less   • Drug use: Yes     Frequency: 7.0 times per week     Types: Marijuana     Comment: CBD, inhaler     SOCIAL HISTORY/DIET AND EXERCISE:  Weight Change: Stable  Diet: common adult  Exercise: Active at work     Family History   Mom - CHF  Dad - CHF, dm, AAA,      Review of Systems   Constitutional: Positive for malaise/fatigue. Negative for fever and weight loss.   HENT: Negative for hearing loss and tinnitus.    Eyes: Negative for blurred vision and double vision.   Respiratory: Positive for cough and shortness of breath. Negative for sputum production.    Cardiovascular: Negative for chest pain, palpitations and leg swelling.   Gastrointestinal: Positive for nausea. Negative for blood in stool, constipation and diarrhea.   Genitourinary: Negative.    Musculoskeletal: Positive for back pain. Negative for joint pain and myalgias.   Skin: Negative for itching and rash.   Neurological: Positive for  dizziness. Negative for sensory change, speech change, focal weakness, seizures and headaches.   Psychiatric/Behavioral: Negative for depression. The patient is nervous/anxious.       Objective:     Vitals:    01/10/20 1504   BP: 144/101   BP Location: Left arm   Patient Position: Sitting   BP Cuff Size: Adult   Pulse: 76   Weight: 105.7 kg (233 lb)   Height: 1.829 m (6')     Body mass index is 31.6 kg/m².  Physical Exam  Vitals signs reviewed.   Constitutional:       General: He is not in acute distress.     Appearance: He is well-developed. He is not diaphoretic.   HENT:      Head: Normocephalic and atraumatic.   Neck:      Musculoskeletal: Normal range of motion and neck supple.      Thyroid: No thyromegaly.      Vascular: No JVD.   Cardiovascular:      Rate and Rhythm: Normal rate and regular rhythm.      Heart sounds: Normal heart sounds. No murmur. No friction rub. No gallop.    Pulmonary:      Effort: No respiratory distress.      Breath sounds: Normal breath sounds. No wheezing or rales.   Musculoskeletal: Normal range of motion.         General: No tenderness.   Skin:     General: Skin is warm and dry.      Findings: No rash.   Neurological:      Mental Status: He is alert and oriented to person, place, and time.      Cranial Nerves: No cranial nerve deficit.      Coordination: Coordination normal.   Psychiatric:         Behavior: Behavior normal.       Lab Results   Component Value Date    CHOLSTRLTOT 138 01/13/2020    LDL 50 01/13/2020    HDL 35 (A) 01/13/2020    TRIGLYCERIDE 266 (H) 01/13/2020         Lab Results   Component Value Date    HBA1C 5.2 12/13/2019      Lab Results   Component Value Date    SODIUM 139 01/13/2020    POTASSIUM 4.0 01/13/2020    CHLORIDE 104 01/13/2020    CO2 24 01/13/2020    GLUCOSE 95 01/13/2020    BUN 10 01/13/2020    CREATININE 1.06 01/13/2020        Lab Results   Component Value Date    WBC 7.4 01/13/2020    RBC 5.61 01/13/2020    HEMOGLOBIN 17.7 01/13/2020    HEMATOCRIT  "50.6 01/13/2020    MCV 90.2 01/13/2020    MCH 31.6 01/13/2020    MCHC 35.0 01/13/2020    MPV 9.8 01/13/2020      CT scan December 3, 2019 at [facility]  No significant atherosclerotic calcification  No pulmonary emboli  No mediastinal or hilar adenopathy  Left lobe subpleural noncalcified 4.3 mm and 5.0 mm solid nodules    Venous duplex December 30, 2019 at [facility]  Acute partial thrombus in the left femoral vein    Medical Decision Making:  Today's Assessment / Status / Plan:     1. Acute deep vein thrombosis (DVT) of other specified vein of left lower extremity (HCC)       Indication for anticoagulation: DVT found on imaging at [facility] -as this was described as \"partial\" it is difficult to tell if this is acute or chronic.  His symptoms certainly not consistent with acute unilateral DVT    Anti-Platelet/Anti-Coagulant Tx:   We discussed the risks and benefits of continuing anticoagulation in light of his recent LE ultrasound completed in our Vascular clinic.  The repeat ultrasound shows no evidence of superficial or DVT.      Has tolerated Xarelto well without any bleeding issues.  Through shared decision making the pt and I have decided to stop his anticoagulation at this time, considering his repeat ultrasound shows no acute or even chronic thrombosis.  I did recommend he have his D-dimer checked as well- will call him with those results. He understands the risk of recurrent DVT is 10% at 1 year and 25% at 5 years.      Anti-Coagulation Plan:  - Finish Xarelto, about 1 month left  - After stopping Xareto, start ASA 162mg daily  - Check D-dimer now.  - Monitor closely for any s/s of recurrent VTE as described in detail to pt to ER if these occur    Smoking: Continue with complete avoidance    Physical Activity: Ok to return to work     Weight Management and Nutrition: Recommended overall heart healthy eating plan    Constellation of symptoms including shortness of breath and fatigue -as explained to the " patient the differential diagnosis is quite wide.  Many of the symptoms may simply be due to anxiety.  We should rule out structural heart disease.  It sounds like there is least the possibility that he may have had electrolyte imbalance in the emergency room and that should be followed up on.  Recent echo normal.  Referral placed previously to pulmonology.   - Defer further work-up and management to PCP    Pulmonary nodules- referral to pulmonary for further evaluation and management    Anxiety disorder- defer further management to PCP    Instructed to follow-up with PCP for remainder of adult medical needs: yes  We will partner with other provider in the management of established vascular disease and cardiometabolic risk factors    Studies to Be Obtained: None  Labs to Be Obtained: D-dimer    Follow up in: as needed    COLTON Silver    CC:   EMILY Martell

## 2020-01-13 ENCOUNTER — TELEPHONE (OUTPATIENT)
Dept: MEDICAL GROUP | Facility: PHYSICIAN GROUP | Age: 40
End: 2020-01-13

## 2020-01-13 ENCOUNTER — HOSPITAL ENCOUNTER (OUTPATIENT)
Dept: LAB | Facility: MEDICAL CENTER | Age: 40
End: 2020-01-13
Attending: INTERNAL MEDICINE
Payer: COMMERCIAL

## 2020-01-13 ENCOUNTER — HOSPITAL ENCOUNTER (OUTPATIENT)
Dept: LAB | Facility: MEDICAL CENTER | Age: 40
End: 2020-01-13
Attending: NURSE PRACTITIONER
Payer: COMMERCIAL

## 2020-01-13 DIAGNOSIS — I82.492 ACUTE DEEP VEIN THROMBOSIS (DVT) OF OTHER SPECIFIED VEIN OF LEFT LOWER EXTREMITY (HCC): ICD-10-CM

## 2020-01-13 DIAGNOSIS — E78.1 HIGH TRIGLYCERIDES: ICD-10-CM

## 2020-01-13 DIAGNOSIS — R06.09 DOE (DYSPNEA ON EXERTION): ICD-10-CM

## 2020-01-13 LAB
D DIMER PPP IA.FEU-MCNC: <0.4 UG/ML (FEU) (ref 0–0.5)
ERYTHROCYTE [DISTWIDTH] IN BLOOD BY AUTOMATED COUNT: 38.3 FL (ref 35.9–50)
HCT VFR BLD AUTO: 50.6 % (ref 42–52)
HGB BLD-MCNC: 17.7 G/DL (ref 14–18)
MCH RBC QN AUTO: 31.6 PG (ref 27–33)
MCHC RBC AUTO-ENTMCNC: 35 G/DL (ref 33.7–35.3)
MCV RBC AUTO: 90.2 FL (ref 81.4–97.8)
NT-PROBNP SERPL IA-MCNC: 12 PG/ML (ref 0–125)
PLATELET # BLD AUTO: 288 K/UL (ref 164–446)
PMV BLD AUTO: 9.8 FL (ref 9–12.9)
RBC # BLD AUTO: 5.61 M/UL (ref 4.7–6.1)
WBC # BLD AUTO: 7.4 K/UL (ref 4.8–10.8)

## 2020-01-13 PROCEDURE — 80053 COMPREHEN METABOLIC PANEL: CPT

## 2020-01-13 PROCEDURE — 80061 LIPID PANEL: CPT

## 2020-01-13 PROCEDURE — 83880 ASSAY OF NATRIURETIC PEPTIDE: CPT

## 2020-01-13 PROCEDURE — 85379 FIBRIN DEGRADATION QUANT: CPT

## 2020-01-13 PROCEDURE — 85027 COMPLETE CBC AUTOMATED: CPT

## 2020-01-13 NOTE — TELEPHONE ENCOUNTER
Patients wife called and stated she has left messages in regards to the return to work note that was written. His work needs this to be more detailed for his return tomorrow. His wife is requesting a call back as soon as possible.

## 2020-01-14 DIAGNOSIS — E78.1 HIGH TRIGLYCERIDES: ICD-10-CM

## 2020-01-14 LAB
ALBUMIN SERPL BCP-MCNC: 4.8 G/DL (ref 3.2–4.9)
ALBUMIN/GLOB SERPL: 1.9 G/DL
ALP SERPL-CCNC: 74 U/L (ref 30–99)
ALT SERPL-CCNC: 79 U/L (ref 2–50)
ANION GAP SERPL CALC-SCNC: 11 MMOL/L (ref 0–11.9)
AST SERPL-CCNC: 36 U/L (ref 12–45)
BILIRUB SERPL-MCNC: 0.6 MG/DL (ref 0.1–1.5)
BUN SERPL-MCNC: 10 MG/DL (ref 8–22)
CALCIUM SERPL-MCNC: 9.8 MG/DL (ref 8.5–10.5)
CHLORIDE SERPL-SCNC: 104 MMOL/L (ref 96–112)
CHOLEST SERPL-MCNC: 138 MG/DL (ref 100–199)
CO2 SERPL-SCNC: 24 MMOL/L (ref 20–33)
CREAT SERPL-MCNC: 1.06 MG/DL (ref 0.5–1.4)
FASTING STATUS PATIENT QL REPORTED: NORMAL
GLOBULIN SER CALC-MCNC: 2.5 G/DL (ref 1.9–3.5)
GLUCOSE SERPL-MCNC: 95 MG/DL (ref 65–99)
HDLC SERPL-MCNC: 35 MG/DL
LDLC SERPL CALC-MCNC: 50 MG/DL
POTASSIUM SERPL-SCNC: 4 MMOL/L (ref 3.6–5.5)
PROT SERPL-MCNC: 7.3 G/DL (ref 6–8.2)
SODIUM SERPL-SCNC: 139 MMOL/L (ref 135–145)
TRIGL SERPL-MCNC: 266 MG/DL (ref 0–149)

## 2020-01-15 LAB — LDLC SERPL-MCNC: 78 MG/DL (ref 0–129)

## 2020-02-24 DIAGNOSIS — E78.1 HIGH TRIGLYCERIDES: ICD-10-CM

## 2020-02-25 RX ORDER — ROSUVASTATIN CALCIUM 10 MG/1
TABLET, COATED ORAL
Qty: 30 TAB | Refills: 1 | Status: SHIPPED | OUTPATIENT
Start: 2020-02-25 | End: 2020-02-28 | Stop reason: SDUPTHER

## 2020-02-28 ENCOUNTER — OFFICE VISIT (OUTPATIENT)
Dept: MEDICAL GROUP | Facility: PHYSICIAN GROUP | Age: 40
End: 2020-02-28
Payer: COMMERCIAL

## 2020-02-28 VITALS
OXYGEN SATURATION: 93 % | RESPIRATION RATE: 24 BRPM | HEART RATE: 86 BPM | DIASTOLIC BLOOD PRESSURE: 90 MMHG | BODY MASS INDEX: 30.23 KG/M2 | SYSTOLIC BLOOD PRESSURE: 142 MMHG | WEIGHT: 223.2 LBS | HEIGHT: 72 IN | TEMPERATURE: 97.6 F

## 2020-02-28 DIAGNOSIS — F41.9 ANXIETY AND DEPRESSION: ICD-10-CM

## 2020-02-28 DIAGNOSIS — E78.1 HIGH TRIGLYCERIDES: ICD-10-CM

## 2020-02-28 DIAGNOSIS — F32.A ANXIETY AND DEPRESSION: ICD-10-CM

## 2020-02-28 PROCEDURE — 99213 OFFICE O/P EST LOW 20 MIN: CPT | Performed by: NURSE PRACTITIONER

## 2020-02-28 RX ORDER — QUETIAPINE FUMARATE 25 MG/1
50 TABLET, FILM COATED ORAL
COMMUNITY
End: 2020-02-28 | Stop reason: SDUPTHER

## 2020-02-28 RX ORDER — ROSUVASTATIN CALCIUM 10 MG/1
10 TABLET, COATED ORAL DAILY
Qty: 30 TAB | Refills: 2 | Status: SHIPPED | OUTPATIENT
Start: 2020-02-28 | End: 2020-07-06

## 2020-02-28 RX ORDER — QUETIAPINE FUMARATE 25 MG/1
50 TABLET, FILM COATED ORAL
Qty: 90 TAB | Refills: 1 | Status: SHIPPED | OUTPATIENT
Start: 2020-02-28 | End: 2020-06-02

## 2020-02-28 ASSESSMENT — FIBROSIS 4 INDEX: FIB4 SCORE: 0.53

## 2020-02-28 NOTE — PROGRESS NOTES
Chief Complaint   Patient presents with   • Medication Refill       HISTORY OF PRESENT ILLNESS: Patient is a 40 y.o. male, established patient who presents today to discuss medical problems as listed below:    Anxiety and depression  Chronic problem.  Patient states his anxiety and depression is under control, he is eating better.  His wife is helping him with rate control and healthy nutrition.  He reports losing 10 pounds in the last 2 months by eating healthy.  He is currently taking Seroquel 25 mg, 2 tabs daily.  He has been on this medication for a long time.  He denies suicidal homicidal ideation.  He would like a refill today.    High triglycerides  Reviewed recent cholesterol panel, significantly improved.  Patient denies CP, palpitations, dizziness, lower extremity swelling.   Ref. Range 12/13/2019 14:10 1/13/2020 11:20 1/13/2020 11:21   Cholesterol,Tot Latest Ref Range: 100 - 199 mg/dL 172 138    Triglycerides Latest Ref Range: 0 - 149 mg/dL 417 (H) 266 (H)    HDL Latest Ref Range: >=40 mg/dL 30 (A) 35 (A)    LDL Latest Ref Range: <100 mg/dL see below 50            Patient Active Problem List    Diagnosis Date Noted   • High triglycerides 12/20/2019   • Vitamin D deficiency 12/20/2019   • DVT (deep venous thrombosis) ([facility]) 12/06/2019   • Insomnia 12/06/2019   • Anxiety and depression 12/06/2019   • Annual physical exam 12/06/2019   • Multiple lung nodules on CT 12/06/2019        Allergies: Eliquis [apixaban]    Current Outpatient Medications   Medication Sig Dispense Refill   • QUEtiapine (SEROQUEL) 25 MG Tab Take 2 Tabs by mouth every bedtime. 90 Tab 1   • rosuvastatin (CRESTOR) 10 MG Tab TAKE 1 TABLET BY MOUTH EVERY EVENING 30 Tab 1   • vitamin D, Ergocalciferol, (DRISDOL) 59686 units Cap capsule Take 1 Cap by mouth every 7 days. 12 Cap 3   • DULoxetine (CYMBALTA) 30 MG Cap DR Particles Take 1 Cap by mouth every day. 90 Cap 1   • rivaroxaban (XARELTO) 20 MG Tab tablet Take 1 Tab by mouth with dinner.  (Patient not taking: Reported on 2020) 30 Tab 2     No current facility-administered medications for this visit.        Social History     Tobacco Use   • Smoking status: Former Smoker     Packs/day: 0.50     Years: 5.00     Pack years: 2.50     Last attempt to quit: 2014     Years since quittin.2   • Smokeless tobacco: Never Used   • Tobacco comment: quit    Substance Use Topics   • Alcohol use: Not Currently     Frequency: Monthly or less   • Drug use: Yes     Frequency: 7.0 times per week     Types: Marijuana     Comment: CBD, inhaler     Social History     Social History Narrative   • Not on file       Family History   Problem Relation Age of Onset   • Heart Disease Mother    • Hypertension Mother    • Heart Disease Father    • Diabetes Father        Allergies, past medical history, past surgical history, family history, social history reviewed and updated.    Review of Systems:     - Constitutional: Negative for fever, chills, unexpected weight change, and fatigue/generalized weakness.     - Respiratory: Negative for cough, sputum production, chest congestion, dyspnea, wheezing, and crackles.      - Cardiovascular: Negative for chest pain, palpitations, orthopnea, and bilateral lower extremity edema.     - Psychiatric/Behavioral: Negative for depression, suicidal/homicidal ideation and memory loss.      All other systems reviewed and are negative    Exam:    /90 (BP Location: Left arm, Patient Position: Sitting)   Pulse 86   Temp 36.4 °C (97.6 °F)   Resp (!) 24   Ht 1.829 m (6')   Wt 101.2 kg (223 lb 3.2 oz)   SpO2 93%   BMI 30.27 kg/m²  Body mass index is 30.27 kg/m².    Physical Exam:  Constitutional: Well-developed and well-nourished. Not diaphoretic. No distress.   Cardiovascular: Regular rate and rhythm, S1 and S2 without murmur, rubs, or gallops.    Chest: Effort normal. Clear to auscultation throughout. No adventitious sounds.   Neurological: Alert and oriented x 3.    Psychiatric:  Behavior, mood, and affect are appropriate.    LABS: 1/13/20  results reviewed and discussed with the patient, questions answered.    Patient was seen for 15 minutes face to face of which > 50% of appointment time was spent on counseling and coordination of care regarding the above.     Assessment/Plan:  1. Anxiety and depression  Well-controlled, continue current medication and dosage, refills given.  Continue healthy lifestyle such as exercise and healthy nutrition.  - QUEtiapine (SEROQUEL) 25 MG Tab; Take 2 Tabs by mouth every bedtime.  Dispense: 90 Tab; Refill: 1    2. High triglycerides  Improving.  Continue current medication.  Will recheck lipids and lipid panel in 3 months.  - Lipid Profile; Future  - Comp Metabolic Panel; Future    Discussed with patient possible alternative diagnoses, pt is to take all medications as prescribed. If symptoms persist FU w/PCP, if symptoms worsen go to emergency room. If experiencing any side effects from prescribed medications reports to the office immediately or go to emergency room.  Reviewed indication, dosage, usage and potential adverse effects of prescribed medications. Reviewed risks and benefits of treatment plan. Patient verbalizes understanding of all instruction and verbally agrees to plan.    No follow-ups on file.

## 2020-02-28 NOTE — ASSESSMENT & PLAN NOTE
Chronic problem.  Patient states his anxiety and depression is under control, he is eating better.  His wife is helping him with rate control and healthy nutrition.  He reports losing 10 pounds in the last 2 months by eating healthy.  He is currently taking Seroquel 25 mg, 2 tabs daily.  He has been on this medication for a long time.  He denies suicidal homicidal ideation.  He would like a refill today.

## 2020-02-28 NOTE — ASSESSMENT & PLAN NOTE
Reviewed recent cholesterol panel, significantly improved.  Patient denies CP, palpitations, dizziness, lower extremity swelling.   Ref. Range 12/13/2019 14:10 1/13/2020 11:20 1/13/2020 11:21   Cholesterol,Tot Latest Ref Range: 100 - 199 mg/dL 172 138    Triglycerides Latest Ref Range: 0 - 149 mg/dL 417 (H) 266 (H)    HDL Latest Ref Range: >=40 mg/dL 30 (A) 35 (A)    LDL Latest Ref Range: <100 mg/dL see below 50

## 2020-02-28 NOTE — TELEPHONE ENCOUNTER
Received request via: Patient    Was the patient seen in the last year in this department? Yes    Does the patient have an active prescription (recently filled or refills available) for medication(s) requested? No     **Also spoke to patient about getting labs done in 3 months and a follow up in 4 months per Marielle

## 2020-05-30 DIAGNOSIS — F41.9 ANXIETY AND DEPRESSION: ICD-10-CM

## 2020-05-30 DIAGNOSIS — F32.A ANXIETY AND DEPRESSION: ICD-10-CM

## 2020-06-02 RX ORDER — QUETIAPINE FUMARATE 25 MG/1
TABLET, FILM COATED ORAL
Qty: 90 TAB | Refills: 1 | Status: SHIPPED | OUTPATIENT
Start: 2020-06-02 | End: 2020-09-04

## 2020-06-11 ENCOUNTER — OFFICE VISIT (OUTPATIENT)
Dept: MEDICAL GROUP | Facility: PHYSICIAN GROUP | Age: 40
End: 2020-06-11
Payer: COMMERCIAL

## 2020-06-11 VITALS
WEIGHT: 221.4 LBS | SYSTOLIC BLOOD PRESSURE: 118 MMHG | HEART RATE: 79 BPM | RESPIRATION RATE: 20 BRPM | DIASTOLIC BLOOD PRESSURE: 78 MMHG | TEMPERATURE: 98.2 F | BODY MASS INDEX: 29.99 KG/M2 | HEIGHT: 72 IN | OXYGEN SATURATION: 95 %

## 2020-06-11 DIAGNOSIS — F41.9 ANXIETY AND DEPRESSION: ICD-10-CM

## 2020-06-11 DIAGNOSIS — F32.A ANXIETY AND DEPRESSION: ICD-10-CM

## 2020-06-11 PROCEDURE — 99214 OFFICE O/P EST MOD 30 MIN: CPT | Performed by: NURSE PRACTITIONER

## 2020-06-11 RX ORDER — BUSPIRONE HYDROCHLORIDE 10 MG/1
10 TABLET ORAL 2 TIMES DAILY
COMMUNITY
End: 2021-01-05

## 2020-06-11 ASSESSMENT — PATIENT HEALTH QUESTIONNAIRE - PHQ9
5. POOR APPETITE OR OVEREATING: 0 - NOT AT ALL
CLINICAL INTERPRETATION OF PHQ2 SCORE: 2
SUM OF ALL RESPONSES TO PHQ QUESTIONS 1-9: 7

## 2020-06-11 ASSESSMENT — FIBROSIS 4 INDEX: FIB4 SCORE: 0.53

## 2020-06-11 NOTE — PROGRESS NOTES
"Chief Complaint   Patient presents with   • Medication Problem     Needs anxiety meds        HISTORY OF PRESENT ILLNESS: Patient is a 40 y.o. male, established patient who presents today to discuss medical problems as listed below:    Health Maintenance:  COMPLETED.    Anxiety and depression  Chronic issue.  Patient is here with his wife and son.  Pt reports intermittent anxiety related to work, as he does not like his work environment or his coworkers. \"  I feel like this punching them\".  He denies panic attacks, denies depression, denies suicidal homicidal ideation.  His PHQ today is 7 which supports mild depression Dx. Patient does not have any previous attempts.  He states he stopped Cymbalta months ago and was doing okay.  He only takes Seroquel 25 mg 3 times a week for sleep as needed.  He reports taking his BuSpar 10 mg twice daily regularly.  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  He states he started thinking about switching jobs.  Patient states he is not interested in any medications at this time, he does not like medications and he does not think that his issue can be solved with medications.      Patient Active Problem List    Diagnosis Date Noted   • High triglycerides 12/20/2019   • Vitamin D deficiency 12/20/2019   • DVT (deep venous thrombosis) ([facility]) 12/06/2019   • Insomnia 12/06/2019   • Anxiety and depression 12/06/2019   • Annual physical exam 12/06/2019   • Multiple lung nodules on CT 12/06/2019        Allergies: Patient has no known allergies.    Current Outpatient Medications   Medication Sig Dispense Refill   • busPIRone (BUSPAR) 10 MG Tab tablet Take 10 mg by mouth 2 times a day.     • QUEtiapine (SEROQUEL) 25 MG Tab TAKE 2 TABLETS BY MOUTH EVERY NIGHT AT BEDTIME 90 Tab 1   • rosuvastatin (CRESTOR) 10 MG Tab Take 1 Tab by mouth every day. 30 Tab 2   • vitamin D, Ergocalciferol, (DRISDOL) 42981 units Cap " capsule Take 1 Cap by mouth every 7 days. 12 Cap 3   • DULoxetine (CYMBALTA) 30 MG Cap DR Particles Take 1 Cap by mouth every day. 90 Cap 1   • rivaroxaban (XARELTO) 20 MG Tab tablet Take 1 Tab by mouth with dinner. (Patient not taking: Reported on 2020) 30 Tab 2     No current facility-administered medications for this visit.        Social History     Tobacco Use   • Smoking status: Former Smoker     Packs/day: 0.50     Years: 5.00     Pack years: 2.50     Last attempt to quit: 2014     Years since quittin.5   • Smokeless tobacco: Never Used   • Tobacco comment: quit    Substance Use Topics   • Alcohol use: Not Currently     Frequency: Monthly or less     Comment: Occ   • Drug use: Yes     Frequency: 7.0 times per week     Types: Marijuana     Comment: CBD, inhaler     Social History     Social History Narrative   • Not on file       Family History   Problem Relation Age of Onset   • Heart Disease Mother    • Hypertension Mother    • Heart Disease Father    • Diabetes Father        Allergies, past medical history, past surgical history, family history, social history reviewed and updated.    Review of Systems:     - Constitutional: Negative for fever, chills, unexpected weight change, and fatigue/generalized weakness.     - Respiratory: Negative for cough, sputum production, chest congestion, dyspnea, wheezing, and crackles.      - Cardiovascular: Negative for chest pain, palpitations, orthopnea, and bilateral lower extremity edema.     - Psychiatric/Behavioral: Negative for depression, suicidal/homicidal ideation and memory loss.      All other systems reviewed and are negative    Exam:    /78 (BP Location: Left arm, Patient Position: Sitting, BP Cuff Size: Adult)   Pulse 79   Temp 36.8 °C (98.2 °F) (Temporal)   Resp 20   Ht 1.829 m (6')   Wt 100.4 kg (221 lb 6.4 oz)   SpO2 95%   BMI 30.03 kg/m²  Body mass index is 30.03 kg/m².    Physical Exam:  Constitutional: Well-developed and  well-nourished. Not diaphoretic. No distress.   Cardiovascular: Regular rate and rhythm, S1 and S2 without murmur, rubs, or gallops.    Chest: Effort normal. Clear to auscultation throughout. No adventitious sounds.   Neurological: Alert and oriented x 3.   Psychiatric:  Behavior, mood, and affect are appropriate.  MA/nursing note and vitals reviewed.    Patient was seen for 25 minutes face to face of which > 50% of appointment time was spent on counseling and coordination of care regarding the above.     Assessment/Plan:  1. Anxiety and depression  Controlled, stable.  Most likely situational due to his work environment.  Discussed etiology and the need for stress management in a safe and professional environment.  Patient answered no to suicidal homicidal ideation.  Recommend behavioral therapy.  Detailed discussion about the need for therapeutic intervention.  Also recommend stress and anger management self-help books at the library, audiobooks.  Discussed the importance of nonpharmacological stress management such as slow deep breathing, yoga, stretching, exercise, healthy foods.  With alcohol and other substances.  Patient agrees with plan and agree to have referral placed today.  Placed a referral under urgent priority.  Patient was also encouraged to call local therapist in his area and to ask if they accept his health insurance to expedite the matter.  Discussed crisis intervention, and advised to call 911, to go to the nearest ED or to call national suicide intervention line in case of crisis, handout given.  - REFERRAL TO BEHAVIORAL HEALTH       Discussed with patient possible alternative diagnoses, pt is to take all medications as prescribed. If symptoms persist FU w/PCP, if symptoms worsen go to emergency room. If experiencing any side effects from prescribed medications reports to the office immediately or go to emergency room.  Reviewed indication, dosage, usage and potential adverse effects of  prescribed medications. Reviewed risks and benefits of treatment plan. Patient verbalizes understanding of all instruction and verbally agrees to plan.    Return if symptoms worsen or fail to improve.

## 2020-06-11 NOTE — ASSESSMENT & PLAN NOTE
"Chronic issue.  Patient is here with his wife and son.  Pt reports intermittent anxiety related to work, as he does not like his work environment or his coworkers. \"  I feel like this punching them\".  He denies panic attacks, denies depression, denies suicidal homicidal ideation.  His PHQ today is 7 which supports mild depression Dx. Patient does not have any previous attempts.  He states he stopped Cymbalta months ago and was doing okay.  He only takes Seroquel 25 mg 3 times a week for sleep as needed.  He reports taking his BuSpar 10 mg twice daily regularly.  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  He states he started thinking about switching jobs.  Patient states he is not interested in any medications at this time, he does not like medications and he does not think that his issue can be solved with medications.  "

## 2020-07-06 DIAGNOSIS — E78.1 HIGH TRIGLYCERIDES: ICD-10-CM

## 2020-07-06 RX ORDER — ROSUVASTATIN CALCIUM 10 MG/1
TABLET, COATED ORAL
Qty: 30 TAB | Refills: 2 | Status: SHIPPED | OUTPATIENT
Start: 2020-07-06 | End: 2020-10-01

## 2020-07-06 RX ORDER — DULOXETIN HYDROCHLORIDE 30 MG/1
CAPSULE, DELAYED RELEASE ORAL
Qty: 90 CAP | Refills: 1 | Status: SHIPPED | OUTPATIENT
Start: 2020-07-06 | End: 2021-01-05

## 2020-09-03 ENCOUNTER — OFFICE VISIT (OUTPATIENT)
Dept: MEDICAL GROUP | Facility: PHYSICIAN GROUP | Age: 40
End: 2020-09-03
Payer: COMMERCIAL

## 2020-09-03 VITALS
HEIGHT: 72 IN | TEMPERATURE: 97.7 F | BODY MASS INDEX: 28.73 KG/M2 | SYSTOLIC BLOOD PRESSURE: 130 MMHG | RESPIRATION RATE: 20 BRPM | HEART RATE: 84 BPM | WEIGHT: 212.1 LBS | OXYGEN SATURATION: 95 % | DIASTOLIC BLOOD PRESSURE: 90 MMHG

## 2020-09-03 DIAGNOSIS — F41.9 ANXIETY AND DEPRESSION: ICD-10-CM

## 2020-09-03 DIAGNOSIS — F32.A ANXIETY AND DEPRESSION: ICD-10-CM

## 2020-09-03 DIAGNOSIS — E78.1 HIGH TRIGLYCERIDES: ICD-10-CM

## 2020-09-03 DIAGNOSIS — Z00.00 ANNUAL PHYSICAL EXAM: ICD-10-CM

## 2020-09-03 PROCEDURE — 99214 OFFICE O/P EST MOD 30 MIN: CPT | Performed by: NURSE PRACTITIONER

## 2020-09-03 RX ORDER — DULOXETIN HYDROCHLORIDE 30 MG/1
30 CAPSULE, DELAYED RELEASE ORAL 2 TIMES DAILY
Qty: 180 CAP | Refills: 1 | Status: SHIPPED | OUTPATIENT
Start: 2020-09-03 | End: 2021-01-05

## 2020-09-03 ASSESSMENT — FIBROSIS 4 INDEX: FIB4 SCORE: 0.53

## 2020-09-03 NOTE — ASSESSMENT & PLAN NOTE
Last labs are from January of this year.  Will order current lipid panel.  He denies CP, palpitations, dizziness.  His BP is under control.

## 2020-09-03 NOTE — ASSESSMENT & PLAN NOTE
Chronic problem.  Patient states he is doing much better since last visit.  He started taking duloxetine 30 mg, interested in increasing the dose as in the past higher dose was more effective for him.  He is also open to starting CBT.  Referral to behavioral health was placed back in June of this year, contact information was given.  He denies suicidal homicidal ideation.

## 2020-09-03 NOTE — LETTER
[facility]  [id number]     September 3, 2020    Patient: Robert Dahl   YOB: 1980   Date of Visit: 9/3/2020       To Whom It May Concern:    Robert Dahl was seen and treated in our department on 9/3/2020. Please excuse yesterday and today's absence.     Sincerely,     Marielle Kemp

## 2020-09-03 NOTE — PROGRESS NOTES
Chief Complaint   Patient presents with   • Medication Problem     Duloxetine increased        HISTORY OF PRESENT ILLNESS: Patient is a 40 y.o. male, established patient who presents today to discuss medical problems as listed below:    Health Maintenance:  COMPLETED     Anxiety and depression  Chronic problem.  Patient states he is doing much better since last visit.  He started taking duloxetine 30 mg, interested in increasing the dose as in the past higher dose was more effective for him.  He is also open to starting CBT.  Referral to behavioral health was placed back in June of this year, contact information was given.  He denies suicidal homicidal ideation.    High triglycerides  Last labs are from January of this year.  Will order current lipid panel.  He denies CP, palpitations, dizziness.  His BP is under control.      Patient Active Problem List    Diagnosis Date Noted   • High triglycerides 12/20/2019   • Vitamin D deficiency 12/20/2019   • DVT (deep venous thrombosis) ([facility]) 12/06/2019   • Insomnia 12/06/2019   • Anxiety and depression 12/06/2019   • Annual physical exam 12/06/2019   • Multiple lung nodules on CT 12/06/2019        Allergies: Patient has no known allergies.    Current Outpatient Medications   Medication Sig Dispense Refill   • DULoxetine (CYMBALTA) 30 MG Cap DR Particles Take 1 Cap by mouth 2 times a day. 180 Cap 1   • DULoxetine (CYMBALTA) 30 MG Cap DR Particles TAKE 1 CAPSULE BY MOUTH EVERY DAY 90 Cap 1   • rosuvastatin (CRESTOR) 10 MG Tab TAKE 1 TABLET BY MOUTH EVERY DAY 30 Tab 2   • QUEtiapine (SEROQUEL) 25 MG Tab TAKE 2 TABLETS BY MOUTH EVERY NIGHT AT BEDTIME 90 Tab 1   • vitamin D, Ergocalciferol, (DRISDOL) 60203 units Cap capsule Take 1 Cap by mouth every 7 days. 12 Cap 3   • busPIRone (BUSPAR) 10 MG Tab tablet Take 10 mg by mouth 2 times a day.     • rivaroxaban (XARELTO) 20 MG Tab tablet Take 1 Tab by mouth with dinner. (Patient not taking: Reported on 2/28/2020) 30 Tab 2     No  current facility-administered medications for this visit.        Social History     Tobacco Use   • Smoking status: Former Smoker     Packs/day: 0.50     Years: 5.00     Pack years: 2.50     Quit date: 2014     Years since quittin.7   • Smokeless tobacco: Never Used   • Tobacco comment: quit    Substance Use Topics   • Alcohol use: Not Currently     Frequency: Monthly or less     Comment: Occ   • Drug use: Yes     Frequency: 7.0 times per week     Types: Marijuana     Comment: CBD, inhaler     Social History     Social History Narrative   • Not on file       Family History   Problem Relation Age of Onset   • Heart Disease Mother    • Hypertension Mother    • Heart Disease Father    • Diabetes Father        Allergies, past medical history, past surgical history, family history, social history reviewed and updated.    Review of Systems:     - Constitutional: Negative for fever, chills, unexpected weight change, and fatigue/generalized weakness.     - Respiratory: Negative for cough, sputum production, chest congestion, dyspnea, wheezing, and crackles.      - Cardiovascular: Negative for chest pain, palpitations, orthopnea, and bilateral lower extremity edema.     - Psychiatric/Behavioral: Negative for depression, suicidal/homicidal ideation and memory loss.      All other systems reviewed and are negative    Exam:    /90 (BP Location: Left arm, Patient Position: Sitting, BP Cuff Size: Adult)   Pulse 84   Temp 36.5 °C (97.7 °F) (Temporal)   Resp 20   Ht 1.829 m (6')   Wt 96.2 kg (212 lb 1.6 oz)   SpO2 95%   BMI 28.77 kg/m²  Body mass index is 28.77 kg/m².    Physical Exam:  Constitutional: Well-developed and well-nourished. Not diaphoretic. No distress.   Cardiovascular: Regular rate and rhythm, S1 and S2 without murmur, rubs, or gallops.    Chest: Effort normal. Clear to auscultation throughout. No adventitious sounds.  Neurological: Alert and oriented x 3.  Psychiatric:  Behavior, mood, and  affect are appropriate.  MA/nursing note and vitals reviewed.    LABS: January 2920  results reviewed and discussed with the patient, questions answered.    Patient was seen for 25 minutes face to face of which > 50% of appointment time was spent on counseling and coordination of care regarding the above.     Assessment/Plan:  1. Anxiety and depression  Uncontrolled, stable.  Discussed the benefits of prescribed medication and CBT therapy.  Patient agrees and is interested in therapy today.  Patient was given contact information to referral which was processed back in June of this year.  Also encouraged to look into psychology [email or web address] for additional resources.  Discussed the importance of healthy nutrition and other nonpharmacological stress management such as meditation, deep breathing and exercise for relaxation and sleep.  - DULoxetine (CYMBALTA) 30 MG Cap DR Particles; Take 1 Cap by mouth 2 times a day.  Dispense: 180 Cap; Refill: 1    2. Annual physical exam  - CBC WITH DIFFERENTIAL; Future  - Comp Metabolic Panel; Future  - Lipid Profile; Future    3. High triglycerides  We will repeat lipid panel.  Patient reports being active and exercising daily and his diet has improved significantly since last lipid results were discussed.       Discussed with patient possible alternative diagnoses, pt is to take all medications as prescribed. If symptoms persist FU w/PCP, if symptoms worsen go to emergency room. If experiencing any side effects from prescribed medications reports to the office immediately or go to emergency room.  Reviewed indication, dosage, usage and potential adverse effects of prescribed medications. Reviewed risks and benefits of treatment plan. Patient verbalizes understanding of all instruction and verbally agrees to plan.    Return if symptoms worsen or fail to improve.

## 2020-09-04 DIAGNOSIS — F41.9 ANXIETY AND DEPRESSION: ICD-10-CM

## 2020-09-04 DIAGNOSIS — E55.9 VITAMIN D DEFICIENCY: ICD-10-CM

## 2020-09-04 DIAGNOSIS — F32.A ANXIETY AND DEPRESSION: ICD-10-CM

## 2020-09-04 RX ORDER — ERGOCALCIFEROL 1.25 MG/1
CAPSULE ORAL
Qty: 12 CAP | Refills: 3 | Status: SHIPPED | OUTPATIENT
Start: 2020-09-04 | End: 2022-02-03

## 2020-09-04 RX ORDER — QUETIAPINE FUMARATE 25 MG/1
TABLET, FILM COATED ORAL
Qty: 90 TAB | Refills: 1 | Status: SHIPPED | OUTPATIENT
Start: 2020-09-04 | End: 2021-05-06

## 2020-11-18 DIAGNOSIS — Z00.00 ANNUAL PHYSICAL EXAM: ICD-10-CM

## 2020-11-19 DIAGNOSIS — Z79.01 CHRONIC ANTICOAGULATION: ICD-10-CM

## 2020-11-20 ENCOUNTER — HOSPITAL ENCOUNTER (OUTPATIENT)
Dept: LAB | Facility: MEDICAL CENTER | Age: 40
End: 2020-11-20
Attending: NURSE PRACTITIONER
Payer: COMMERCIAL

## 2020-11-20 DIAGNOSIS — Z00.00 ANNUAL PHYSICAL EXAM: ICD-10-CM

## 2020-11-20 LAB
ALBUMIN SERPL BCP-MCNC: 4.7 G/DL (ref 3.2–4.9)
ALBUMIN/GLOB SERPL: 2 G/DL
ALP SERPL-CCNC: 76 U/L (ref 30–99)
ALT SERPL-CCNC: 30 U/L (ref 2–50)
ANION GAP SERPL CALC-SCNC: 5 MMOL/L (ref 7–16)
AST SERPL-CCNC: 26 U/L (ref 12–45)
BASOPHILS # BLD AUTO: 0.8 % (ref 0–1.8)
BASOPHILS # BLD: 0.05 K/UL (ref 0–0.12)
BILIRUB SERPL-MCNC: 0.7 MG/DL (ref 0.1–1.5)
BUN SERPL-MCNC: 15 MG/DL (ref 8–22)
CALCIUM SERPL-MCNC: 9.7 MG/DL (ref 8.5–10.5)
CHLORIDE SERPL-SCNC: 104 MMOL/L (ref 96–112)
CHOLEST SERPL-MCNC: 136 MG/DL (ref 100–199)
CO2 SERPL-SCNC: 28 MMOL/L (ref 20–33)
CREAT SERPL-MCNC: 1.18 MG/DL (ref 0.5–1.4)
EOSINOPHIL # BLD AUTO: 0.03 K/UL (ref 0–0.51)
EOSINOPHIL NFR BLD: 0.5 % (ref 0–6.9)
ERYTHROCYTE [DISTWIDTH] IN BLOOD BY AUTOMATED COUNT: 40 FL (ref 35.9–50)
FASTING STATUS PATIENT QL REPORTED: NORMAL
GLOBULIN SER CALC-MCNC: 2.3 G/DL (ref 1.9–3.5)
GLUCOSE SERPL-MCNC: 112 MG/DL (ref 65–99)
HCT VFR BLD AUTO: 46.4 % (ref 42–52)
HDLC SERPL-MCNC: 39 MG/DL
HGB BLD-MCNC: 16 G/DL (ref 14–18)
IMM GRANULOCYTES # BLD AUTO: 0.01 K/UL (ref 0–0.11)
IMM GRANULOCYTES NFR BLD AUTO: 0.2 % (ref 0–0.9)
LDLC SERPL CALC-MCNC: 73 MG/DL
LYMPHOCYTES # BLD AUTO: 2.28 K/UL (ref 1–4.8)
LYMPHOCYTES NFR BLD: 35.9 % (ref 22–41)
MCH RBC QN AUTO: 31.4 PG (ref 27–33)
MCHC RBC AUTO-ENTMCNC: 34.5 G/DL (ref 33.7–35.3)
MCV RBC AUTO: 91 FL (ref 81.4–97.8)
MONOCYTES # BLD AUTO: 0.5 K/UL (ref 0–0.85)
MONOCYTES NFR BLD AUTO: 7.9 % (ref 0–13.4)
NEUTROPHILS # BLD AUTO: 3.48 K/UL (ref 1.82–7.42)
NEUTROPHILS NFR BLD: 54.7 % (ref 44–72)
NRBC # BLD AUTO: 0 K/UL
NRBC BLD-RTO: 0 /100 WBC
PLATELET # BLD AUTO: 271 K/UL (ref 164–446)
PMV BLD AUTO: 10 FL (ref 9–12.9)
POTASSIUM SERPL-SCNC: 4.6 MMOL/L (ref 3.6–5.5)
PROT SERPL-MCNC: 7 G/DL (ref 6–8.2)
RBC # BLD AUTO: 5.1 M/UL (ref 4.7–6.1)
SODIUM SERPL-SCNC: 137 MMOL/L (ref 135–145)
TRIGL SERPL-MCNC: 121 MG/DL (ref 0–149)
WBC # BLD AUTO: 6.4 K/UL (ref 4.8–10.8)

## 2020-11-20 PROCEDURE — 36415 COLL VENOUS BLD VENIPUNCTURE: CPT

## 2020-11-20 PROCEDURE — 80061 LIPID PANEL: CPT

## 2020-11-20 PROCEDURE — 80053 COMPREHEN METABOLIC PANEL: CPT

## 2020-11-20 PROCEDURE — 84403 ASSAY OF TOTAL TESTOSTERONE: CPT

## 2020-11-20 PROCEDURE — 85025 COMPLETE CBC W/AUTO DIFF WBC: CPT

## 2020-11-20 PROCEDURE — 84402 ASSAY OF FREE TESTOSTERONE: CPT

## 2020-11-20 PROCEDURE — 84270 ASSAY OF SEX HORMONE GLOBUL: CPT

## 2020-11-22 LAB
SHBG SERPL-SCNC: 24 NMOL/L (ref 11–80)
TESTOST FREE MFR SERPL: 2.1 % (ref 1.6–2.9)
TESTOST FREE SERPL-MCNC: 65 PG/ML (ref 47–244)
TESTOST SERPL-MCNC: 310 NG/DL (ref 300–1080)

## 2020-11-23 ENCOUNTER — HOSPITAL ENCOUNTER (OUTPATIENT)
Facility: MEDICAL CENTER | Age: 40
End: 2020-11-23
Payer: COMMERCIAL

## 2020-11-23 ENCOUNTER — OFFICE VISIT (OUTPATIENT)
Dept: URGENT CARE | Facility: PHYSICIAN GROUP | Age: 40
End: 2020-11-23
Payer: COMMERCIAL

## 2020-11-23 VITALS
RESPIRATION RATE: 16 BRPM | DIASTOLIC BLOOD PRESSURE: 80 MMHG | HEIGHT: 72 IN | TEMPERATURE: 99.2 F | SYSTOLIC BLOOD PRESSURE: 116 MMHG | WEIGHT: 218 LBS | BODY MASS INDEX: 29.53 KG/M2 | OXYGEN SATURATION: 96 % | HEART RATE: 88 BPM

## 2020-11-23 DIAGNOSIS — J06.9 UPPER RESPIRATORY TRACT INFECTION, UNSPECIFIED TYPE: ICD-10-CM

## 2020-11-23 PROCEDURE — U0003 INFECTIOUS AGENT DETECTION BY NUCLEIC ACID (DNA OR RNA); SEVERE ACUTE RESPIRATORY SYNDROME CORONAVIRUS 2 (SARS-COV-2) (CORONAVIRUS DISEASE [COVID-19]), AMPLIFIED PROBE TECHNIQUE, MAKING USE OF HIGH THROUGHPUT TECHNOLOGIES AS DESCRIBED BY CMS-2020-01-R: HCPCS

## 2020-11-23 PROCEDURE — 99204 OFFICE O/P NEW MOD 45 MIN: CPT | Mod: CS | Performed by: PHYSICIAN ASSISTANT

## 2020-11-23 ASSESSMENT — FIBROSIS 4 INDEX: FIB4 SCORE: 0.67

## 2020-11-23 NOTE — PROGRESS NOTES
Chief Complaint   Patient presents with   • Fever     fatigue       HISTORY OF PRESENT ILLNESS: Patient is a 40 y.o. male who presents today because he has a 1 day history of mild body aches, low-grade fever, headache.  He has not been taking any medications for his symptoms.  He got to work today and had a low-grade temperature and was not allowed to return to work until he gets a Covid test    Patient Active Problem List    Diagnosis Date Noted   • High triglycerides 2019   • Vitamin D deficiency 2019   • DVT (deep venous thrombosis) ([facility]) 2019   • Insomnia 2019   • Anxiety and depression 2019   • Annual physical exam 2019   • Multiple lung nodules on CT 2019       Allergies:Patient has no known allergies.    Current Outpatient Medications Ordered in Epic   Medication Sig Dispense Refill   • rosuvastatin (CRESTOR) 10 MG Tab TAKE 1 TABLET BY MOUTH EVERY DAY 90 Tab 1   • vitamin D, Ergocalciferol, (DRISDOL) 1.25 MG (48757 UT) Cap capsule TAKE 1 CAPSULE BY MOUTH EVERY 7 DAYS 12 Cap 3   • QUEtiapine (SEROQUEL) 25 MG Tab TAKE 2 TABLETS BY MOUTH EVERY NIGHT AT BEDTIME 90 Tab 1   • DULoxetine (CYMBALTA) 30 MG Cap DR Particles Take 1 Cap by mouth 2 times a day. 180 Cap 1   • DULoxetine (CYMBALTA) 30 MG Cap DR Particles TAKE 1 CAPSULE BY MOUTH EVERY DAY 90 Cap 1   • busPIRone (BUSPAR) 10 MG Tab tablet Take 10 mg by mouth 2 times a day.     • rivaroxaban (XARELTO) 20 MG Tab tablet Take 1 Tab by mouth with dinner. (Patient not taking: Reported on 2020) 30 Tab 2     No current Epic-ordered facility-administered medications on file.        Past Medical History:   Diagnosis Date   • DVT (deep venous thrombosis) ([facility])    • Hyperlipidemia        Social History     Tobacco Use   • Smoking status: Former Smoker     Packs/day: 0.50     Years: 5.00     Pack years: 2.50     Quit date: 2014     Years since quittin.9   • Smokeless tobacco: Never Used   • Tobacco comment: quit     Substance Use Topics   • Alcohol use: Not Currently     Frequency: Monthly or less     Comment: Occ   • Drug use: Yes     Frequency: 7.0 times per week     Types: Marijuana     Comment: CBD, inhaler       Family Status   Relation Name Status   • Mo  (Not Specified)   • Fa  (Not Specified)     Family History   Problem Relation Age of Onset   • Heart Disease Mother    • Hypertension Mother    • Heart Disease Father    • Diabetes Father        ROS:  Review of Systems   Constitutional: Positive for fever, no chills, positive for myalgias and malaise/fatigue.   HENT: Negative for ear pain, nosebleeds, congestion, sore throat and neck pain.    Eyes: Negative for blurred vision.   Respiratory: Negative for cough, sputum production, shortness of breath and wheezing.    Cardiovascular: Negative for chest pain, palpitations, orthopnea and leg swelling.   Gastrointestinal: Negative for heartburn, nausea, vomiting and abdominal pain.   Genitourinary: Negative for dysuria, urgency and frequency.     Exam:  /80 (BP Location: Right arm, Patient Position: Sitting, BP Cuff Size: Large adult)   Pulse 88   Temp 37.3 °C (99.2 °F) (Temporal)   Resp 16   Ht 1.829 m (6')   Wt 98.9 kg (218 lb)   SpO2 96%   General:  Well nourished, well developed male in NAD  Head:Normocephalic, atraumatic  Eyes: PERRLA, EOM within normal limits, no conjunctival injection, no scleral icterus, visual fields and acuity grossly intact.  Ears: Normal shape and symmetry, no tenderness, no discharge. External canals are without any significant edema or erythema. Tympanic membranes are without any inflammation, no effusion. Gross auditory acuity is intact  Nose: Symmetrical without tenderness, no discharge.  Mouth: reasonable hygiene, no erythema exudates or tonsillar enlargement.  Neck: no masses, range of motion within normal limits, no tracheal deviation. No obvious thyroid enlargement.  Pulmonary: chest is symmetrical with respiration, no  wheezes, crackles, or rhonchi.  Cardiovascular: regular rate and rhythm without murmurs, rubs, or gallops.  Extremities: no clubbing, cyanosis, or edema.    Please note that this dictation was created using voice recognition software. I have made every reasonable attempt to correct obvious errors, but I expect that there are errors of grammar and possibly content that I did not discover before finalizing the note.    Assessment/Plan:  1. Upper respiratory tract infection, unspecified type  COVID/SARS COV-2 PCR   Discussed over-the-counter symptomatic Aleve as needed, strict isolation until Covid test returns    Followup with primary care in the next 7-10 days if not significantly improving, return to the urgent care or go to the emergency room sooner for any worsening of symptoms.

## 2020-11-24 DIAGNOSIS — J06.9 UPPER RESPIRATORY TRACT INFECTION, UNSPECIFIED TYPE: ICD-10-CM

## 2020-11-24 LAB — COVID ORDER STATUS COVID19: NORMAL

## 2020-11-25 LAB
SARS-COV-2 RNA RESP QL NAA+PROBE: NOTDETECTED
SPECIMEN SOURCE: NORMAL

## 2020-12-08 ENCOUNTER — NON-PROVIDER VISIT (OUTPATIENT)
Dept: URGENT CARE | Facility: PHYSICIAN GROUP | Age: 40
End: 2020-12-08

## 2020-12-08 DIAGNOSIS — Z02.1 PRE-EMPLOYMENT DRUG SCREENING: ICD-10-CM

## 2020-12-08 LAB
AMP AMPHETAMINE: NORMAL
BAR BARBITURATES: NORMAL
BREATH ALCOHOL COMMENT: NORMAL
BZO BENZODIAZEPINES: NORMAL
COC COCAINE: NORMAL
INT CON NEG: NORMAL
INT CON POS: NORMAL
MDMA ECSTASY: NORMAL
MET METHAMPHETAMINES: NORMAL
MTD METHADONE: NORMAL
OPI OPIATES: NORMAL
OXY OXYCODONE: NORMAL
PCP PHENCYCLIDINE: NORMAL
POC BREATHALIZER: 0 PERCENT (ref 0–0.01)
POC URINE DRUG SCREEN OCDRS: NEGATIVE
THC: NORMAL

## 2020-12-08 PROCEDURE — 80305 DRUG TEST PRSMV DIR OPT OBS: CPT | Performed by: NURSE PRACTITIONER

## 2020-12-08 PROCEDURE — 82075 ASSAY OF BREATH ETHANOL: CPT | Performed by: NURSE PRACTITIONER

## 2021-01-05 ENCOUNTER — OFFICE VISIT (OUTPATIENT)
Dept: MEDICAL GROUP | Facility: PHYSICIAN GROUP | Age: 41
End: 2021-01-05
Payer: COMMERCIAL

## 2021-01-05 VITALS
WEIGHT: 212.2 LBS | TEMPERATURE: 98.4 F | BODY MASS INDEX: 28.74 KG/M2 | RESPIRATION RATE: 16 BRPM | SYSTOLIC BLOOD PRESSURE: 120 MMHG | HEART RATE: 74 BPM | OXYGEN SATURATION: 96 % | DIASTOLIC BLOOD PRESSURE: 84 MMHG | HEIGHT: 72 IN

## 2021-01-05 DIAGNOSIS — R10.9 ABDOMINAL WALL PAIN IN RIGHT FLANK: ICD-10-CM

## 2021-01-05 DIAGNOSIS — E78.1 HIGH TRIGLYCERIDES: ICD-10-CM

## 2021-01-05 DIAGNOSIS — E55.9 VITAMIN D DEFICIENCY: ICD-10-CM

## 2021-01-05 DIAGNOSIS — R73.01 ELEVATED FASTING BLOOD SUGAR: ICD-10-CM

## 2021-01-05 DIAGNOSIS — F32.A ANXIETY AND DEPRESSION: ICD-10-CM

## 2021-01-05 DIAGNOSIS — F41.9 ANXIETY AND DEPRESSION: ICD-10-CM

## 2021-01-05 PROCEDURE — 99214 OFFICE O/P EST MOD 30 MIN: CPT | Performed by: NURSE PRACTITIONER

## 2021-01-05 RX ORDER — FLUOXETINE HYDROCHLORIDE 20 MG/1
20 CAPSULE ORAL DAILY
Qty: 30 CAP | Refills: 1 | Status: SHIPPED | OUTPATIENT
Start: 2021-01-05 | End: 2022-02-03

## 2021-01-05 RX ORDER — FLUOXETINE HYDROCHLORIDE 20 MG/1
20 CAPSULE ORAL DAILY
Qty: 30 CAP | Refills: 1 | Status: SHIPPED | OUTPATIENT
Start: 2021-01-05 | End: 2021-01-05

## 2021-01-05 ASSESSMENT — FIBROSIS 4 INDEX: FIB4 SCORE: 0.67

## 2021-01-05 NOTE — ASSESSMENT & PLAN NOTE
Results for LUDMILA BUTLER (MRN [id number]) as of 1/5/2021 12:52   Ref. Range 11/20/2020 10:39   Cholesterol,Tot Latest Ref Range: 100 - 199 mg/dL 136   Triglycerides Latest Ref Range: 0 - 149 mg/dL 121   HDL Latest Ref Range: >=40 mg/dL 39 (A)   LDL Latest Ref Range: <100 mg/dL 73   On Crestor 10 mg, tolerating well.  He denies CP, did dyspnea, dizziness, peripheral edema.

## 2021-01-05 NOTE — PROGRESS NOTES
Chief Complaint   Patient presents with   • Evaluation Of Medication Change       HISTORY OF PRESENT ILLNESS: Patient is a 40 y.o. male, established patient who presents today to discuss medical problems as listed below:    Health Maintenance:  COMPLETED     Anxiety and depression  Chronic problem.  Patient is currently taking Cymbalta 60 mg, Seroquel 25 mg nightly.  States that he feels like a zombie and experiences depression.  He denies excessive anxiety, SI or HI.  He would like to stop Seroquel as he is done in the past and felt a little better in terms of being more energy.    High triglycerides  Results for LUDMILA BUTLER (MRN [id number]) as of 1/5/2021 12:52   Ref. Range 11/20/2020 10:39   Cholesterol,Tot Latest Ref Range: 100 - 199 mg/dL 136   Triglycerides Latest Ref Range: 0 - 149 mg/dL 121   HDL Latest Ref Range: >=40 mg/dL 39 (A)   LDL Latest Ref Range: <100 mg/dL 73   On Crestor 10 mg, tolerating well.  He denies CP, did dyspnea, dizziness, peripheral edema.    Elevated fasting blood sugar  Fasting blood sugar at 112.  Patient admits to excessive simple carbohydrates, drinking a lot of Gatorade.  He admits to low energy.  Because of the taking Seroquel nightly which did decrease his energy.  No family history of DM.    Abdominal wall pain in right flank  New problem.  Intermittent mid right flank tenderness started 2 months ago.  Admits to random dull sensation.  New pain in the last week after patient tried some over-the-counter detox.  He denies fevers, dark urine, jaundice, dysuria or hematuria.  No abdominal pain or distention.      Patient Active Problem List    Diagnosis Date Noted   • Abdominal wall pain in right flank 01/05/2021   • Elevated fasting blood sugar 01/05/2021   • High triglycerides 12/20/2019   • Vitamin D deficiency 12/20/2019   • DVT (deep venous thrombosis) (HCC) 12/06/2019   • Insomnia 12/06/2019   • Anxiety and depression 12/06/2019   • Annual physical exam 12/06/2019   •  Multiple lung nodules on CT 2019        Allergies: Patient has no known allergies.    Current Outpatient Medications   Medication Sig Dispense Refill   • FLUoxetine (PROZAC) 20 MG Cap Take 1 Cap by mouth every day. 30 Cap 1   • rosuvastatin (CRESTOR) 10 MG Tab TAKE 1 TABLET BY MOUTH EVERY DAY 90 Tab 1   • vitamin D, Ergocalciferol, (DRISDOL) 1.25 MG (97509 UT) Cap capsule TAKE 1 CAPSULE BY MOUTH EVERY 7 DAYS 12 Cap 3   • QUEtiapine (SEROQUEL) 25 MG Tab TAKE 2 TABLETS BY MOUTH EVERY NIGHT AT BEDTIME 90 Tab 1   • rivaroxaban (XARELTO) 20 MG Tab tablet Take 1 Tab by mouth with dinner. (Patient not taking: Reported on 2020) 30 Tab 2     No current facility-administered medications for this visit.        Social History     Tobacco Use   • Smoking status: Former Smoker     Packs/day: 0.50     Years: 5.00     Pack years: 2.50     Quit date: 2014     Years since quittin.0   • Smokeless tobacco: Never Used   • Tobacco comment: quit    Substance Use Topics   • Alcohol use: Not Currently     Frequency: Monthly or less     Comment: Occ   • Drug use: Yes     Frequency: 7.0 times per week     Types: Marijuana     Comment: CBD, inhaler     Social History     Social History Narrative   • Not on file       Family History   Problem Relation Age of Onset   • Heart Disease Mother    • Hypertension Mother    • Heart Disease Father    • Diabetes Father        Allergies, past medical history, past surgical history, family history, social history reviewed and updated.    Review of Systems:     - Constitutional: fatigue. Negative for fever, chills, unexpected weight change, and generalized weakness.     - HEENT: Negative for headaches, vision changes, hearing changes, ear pain, ear discharge, rhinorrhea, sinus congestion, sore throat, and neck pain.      - Respiratory: Negative for cough, sputum production, chest congestion, dyspnea, wheezing, and crackles.      - Cardiovascular: Negative for chest pain,  palpitations, orthopnea, and bilateral lower extremity edema.     - Genitourinary: Negative for dysuria, polyuria, hematuria, pyuria, urinary urgency, and urinary incontinence.    - Musculoskeletal: R flank tenderness   - Psychiatric/Behavioral: Negative for depression, suicidal/homicidal ideation and memory loss.      All other systems reviewed and are negative    Exam:    /84 (BP Location: Left arm, Patient Position: Sitting, BP Cuff Size: Adult)   Pulse 74   Temp 36.9 °C (98.4 °F) (Temporal)   Resp 16   Ht 1.829 m (6')   Wt 96.3 kg (212 lb 3.2 oz)   SpO2 96%   BMI 28.78 kg/m²  Body mass index is 28.78 kg/m².    Physical Exam:  Constitutional: Well-developed and well-nourished. Not diaphoretic. No distress.   Skin: Skin is warm and dry. No rash noted.  Eyes: No jaundice.  Conjunctivae and extraocular motions are normal. Pupils are equal, round, and reactive to light. No scleral icterus.   Cardiovascular: Regular rate and rhythm, S1 and S2 without murmur, rubs, or gallops.    Chest: Effort normal. Clear to auscultation throughout. No adventitious sounds. No CVA tenderness.  Abdomen: Soft, non tender, and without distention. Active bowel sounds in all four quadrants. No rebound, guarding, masses or HSM.  : Negative for dysuria, polyuria, hematuria, pyuria, urinary urgency, and urinary incontinence.  Musculoskeletal: All major joints AROM full in all directions without pain.  Neurological: Alert and oriented x 3.  Psychiatric:  Behavior, mood, and affect are appropriate.  MA/nursing note and vitals reviewed.    LABS: 11/2020  results reviewed and discussed with the patient, questions answered.    Patient was seen for 30 minutes face to face of which > 50% of appointment time was spent on counseling and coordination of care regarding the above.     Assessment/Plan:  1. High triglycerides  Stable on current regimen.  Continue.  Discussed the benefits of low saturated fat fat and low-carb diet.  Take OTC  fish oil nightly increase daily fiber intake.  - Comp Metabolic Panel; Future  - Lipid Profile; Future    2. Vitamin D deficiency  - VITAMIN D,25 HYDROXY; Future    3. Anxiety and depression  Uncontrolled, stable.  Will wean off Cymbalta, start discussion of weaning off process and the following 3 weeks.  Will start on Prozac 20 mg.  Discussed benefits versus potential SEs.  Stop if experiencing suicidal ideation, worsening of current symptomology.  We will follow-up in 6 weeks.  - FLUoxetine (PROZAC) 20 MG Cap; Take 1 Cap by mouth every day.  Dispense: 30 Cap; Refill: 1    4. Elevated fasting blood sugar  Discussed the importance of low-carb diet.  Advised to avoid sugary drinks and foods.  Focus on foods with adequate fiber, protein oral content.  - HEMOGLOBIN A1C; Future    5. Abdominal wall pain in right flank  Uncontrolled, stable.  DDx includes ruling out transient cholelithiasis, versus musculoskeletal issue.  Will obtain RUQ.  - US-RUQ; Future       Discussed with patient possible alternative diagnoses, pt is to take all medications as prescribed. If symptoms persist FU w/PCP, if symptoms worsen go to emergency room. If experiencing any side effects from prescribed medications reports to the office immediately or go to emergency room.  Reviewed indication, dosage, usage and potential adverse effects of prescribed medications. Reviewed risks and benefits of treatment plan. Patient verbalizes understanding of all instruction and verbally agrees to plan.    No follow-ups on file.

## 2021-01-05 NOTE — ASSESSMENT & PLAN NOTE
Chronic problem.  Patient is currently taking Cymbalta 60 mg, Seroquel 25 mg nightly.  States that he feels like a zombie and experiences depression.  He denies excessive anxiety, SI or HI.  He would like to stop Seroquel as he is done in the past and felt a little better in terms of being more energy.

## 2021-01-05 NOTE — ASSESSMENT & PLAN NOTE
Fasting blood sugar at 112.  Patient admits to excessive simple carbohydrates, drinking a lot of Gatorade.  He admits to low energy.  Because of the taking Seroquel nightly which did decrease his energy.  No family history of DM.

## 2021-01-05 NOTE — ASSESSMENT & PLAN NOTE
New problem.  Intermittent mid right flank tenderness started 2 months ago.  Admits to random dull sensation.  New pain in the last week after patient tried some over-the-counter detox.  He denies fevers, dark urine, jaundice, dysuria or hematuria.  No abdominal pain or distention.

## 2021-02-16 ENCOUNTER — APPOINTMENT (OUTPATIENT)
Dept: MEDICAL GROUP | Facility: PHYSICIAN GROUP | Age: 41
End: 2021-02-16
Payer: COMMERCIAL

## 2021-05-06 ENCOUNTER — OFFICE VISIT (OUTPATIENT)
Dept: MEDICAL GROUP | Facility: PHYSICIAN GROUP | Age: 41
End: 2021-05-06
Payer: COMMERCIAL

## 2021-05-06 VITALS
HEIGHT: 72 IN | OXYGEN SATURATION: 91 % | SYSTOLIC BLOOD PRESSURE: 124 MMHG | TEMPERATURE: 98.5 F | BODY MASS INDEX: 27.5 KG/M2 | WEIGHT: 203 LBS | DIASTOLIC BLOOD PRESSURE: 89 MMHG | RESPIRATION RATE: 12 BRPM | HEART RATE: 74 BPM

## 2021-05-06 DIAGNOSIS — R11.0 NAUSEA: ICD-10-CM

## 2021-05-06 DIAGNOSIS — Z09 HOSPITAL DISCHARGE FOLLOW-UP: ICD-10-CM

## 2021-05-06 DIAGNOSIS — R10.826 EPIGASTRIC ABDOMINAL TENDERNESS WITH REBOUND TENDERNESS: ICD-10-CM

## 2021-05-06 DIAGNOSIS — K21.00 GASTROESOPHAGEAL REFLUX DISEASE WITH ESOPHAGITIS WITHOUT HEMORRHAGE: ICD-10-CM

## 2021-05-06 DIAGNOSIS — M79.605 PAIN IN LEFT LEG: ICD-10-CM

## 2021-05-06 DIAGNOSIS — R74.8 ELEVATED LIPASE: ICD-10-CM

## 2021-05-06 DIAGNOSIS — R14.0 ABDOMINAL DISTENSION: ICD-10-CM

## 2021-05-06 PROCEDURE — 99214 OFFICE O/P EST MOD 30 MIN: CPT | Performed by: NURSE PRACTITIONER

## 2021-05-06 RX ORDER — DICYCLOMINE HYDROCHLORIDE 10 MG/1
10 CAPSULE ORAL
COMMUNITY
End: 2022-02-03

## 2021-05-06 RX ORDER — BUSPIRONE HYDROCHLORIDE 10 MG/1
10 TABLET ORAL 3 TIMES DAILY
COMMUNITY
Start: 2021-04-07 | End: 2022-02-03

## 2021-05-06 RX ORDER — SUCRALFATE 1 G/1
1 TABLET ORAL
Qty: 120 TABLET | Refills: 3 | Status: SHIPPED | OUTPATIENT
Start: 2021-05-06 | End: 2022-02-03

## 2021-05-06 RX ORDER — TRAZODONE HYDROCHLORIDE 50 MG/1
TABLET ORAL
COMMUNITY
Start: 2021-03-25 | End: 2022-02-03

## 2021-05-06 RX ORDER — OMEPRAZOLE 20 MG/1
20 CAPSULE, DELAYED RELEASE ORAL DAILY
Qty: 30 CAPSULE | Refills: 1 | Status: SHIPPED | OUTPATIENT
Start: 2021-05-06 | End: 2022-02-03

## 2021-05-06 ASSESSMENT — FIBROSIS 4 INDEX: FIB4 SCORE: 0.68

## 2021-05-06 NOTE — ASSESSMENT & PLAN NOTE
Reviewed records from [facility] in [place] from 5/6/2021.  Patient was admitted with left-sided abdominal pain.  Reviewed imaging, abdominal CT with contrast is nonrevealing.  Lab work with elevated lipase levels at 574 and ALT levels of 69, otherwise benign.  Patient was discharged in stable condition.  Today he reports feeling better he has been following liquid diet.  Patient denies excessive alcohol except occasional glass of wine with dinner, no smoking.  Recent history of DVT.  On rosuvastatin for mixed hyperlipidemia which is well controlled.  Patient has been working on his diet and currently reports a healthy diet and healthy lifestyle in general.  He does have history of anxiety, and acid reflux.  Associated symptoms are frequent nausea and bloating.  His acid reflux and not controlled, no OTC medication.  Patient denies fevers, melena, hematochezia.  He does admit to greasy stools.  He has been taking fish oil for his cholesterol and stopped when symptoms appeared.

## 2021-05-06 NOTE — ASSESSMENT & PLAN NOTE
New problem.  Pain in the medial left upper extremity, started 6 months ago, waxes and wanes.  No pain, redness or swelling at this time.  History of DVT in the left extremity 1 year ago, previously on Xarelto, off coagulant now.  He denies CP, dyspnea.

## 2021-05-06 NOTE — LETTER
[facility]  EMILY Martell  [address]  [facility] [place] [facility] [address]  Fax: [phone]   Authorization for Release/Disclosure of   Protected Health Information   Name: KAYLIN BUTLER : 1980 SSN: [id number]   Address: [address] [address] Phone:    [phone] (home)    I authorize the entity listed below to release/disclose the PHI below to:   [facility]/EMILY Martell and EMILY Martell   Provider or Entity Name:  [facility]    Address   City, State, [facility]   Phone:  [phone]    Fax:  [phone]   Reason for request: continuity of care   Information to be released:    [  ] LAST COLONOSCOPY,  including any PATH REPORT and follow-up  [  ] LAST FIT/COLOGUARD RESULT [  ] LAST DEXA  [  ] LAST MAMMOGRAM  [  ] LAST PAP  [  ] LAST LABS [  ] RETINA EXAM REPORT  [  ] IMMUNIZATION RECORDS  [ X ] Release all info      [  ] Check here and initial the line next to each item to release ALL health information INCLUDING  _____ Care and treatment for drug and / or alcohol abuse  _____ HIV testing, infection status, or AIDS  _____ Genetic Testing    DATES OF SERVICE OR TIME PERIOD TO BE DISCLOSED: _____________  I understand and acknowledge that:  * This Authorization may be revoked at any time by you in writing, except if your health information has already been used or disclosed.  * Your health information that will be used or disclosed as a result of you signing this authorization could be re-disclosed by the recipient. If this occurs, your re-disclosed health information may no longer be protected by State or Federal laws.  * You may refuse to sign this Authorization. Your refusal will not affect your ability to obtain treatment.  * This Authorization becomes effective upon signing and will  on (date) __________.      If no date is indicated, this Authorization will  one (1) year from the signature date.    Name: Kaylin Hammond  Hesham    Signature: Continuity of care   Date:     5/6/2021       PLEASE FAX REQUESTED RECORDS BACK TO: [phone]

## 2021-05-06 NOTE — PROGRESS NOTES
Chief Complaint   Patient presents with   • Follow-Up     Hospital followup- 5/4/21       HISTORY OF PRESENT ILLNESS: Patient is a 41 y.o. male, established patient who presents today to discuss medical problems as listed below:    Health Maintenance:  COMPLETED     Hospital discharge follow-up  Reviewed records from [facility] in [place] from 5/6/2021.  Patient was admitted with left-sided abdominal pain.  Reviewed imaging, abdominal CT with contrast is nonrevealing.  Lab work with elevated lipase levels at 574 and ALT levels of 69, otherwise benign.  Patient was discharged in stable condition.  Today he reports feeling better he has been following liquid diet.  Patient denies excessive alcohol except occasional glass of wine with dinner, no smoking.  Recent history of DVT.  On rosuvastatin for mixed hyperlipidemia which is well controlled.  Patient has been working on his diet and currently reports a healthy diet and healthy lifestyle in general.  He does have history of anxiety, and acid reflux.  Associated symptoms are frequent nausea and bloating.  His acid reflux and not controlled, no OTC medication.  Patient denies fevers, melena, hematochezia.  He does admit to greasy stools.  He has been taking fish oil for his cholesterol and stopped when symptoms appeared.    Pain in left leg  New problem.  Pain in the medial left upper extremity, started 6 months ago, waxes and wanes.  No pain, redness or swelling at this time.  History of DVT in the left extremity 1 year ago, previously on Xarelto, off coagulant now.  He denies CP, dyspnea.      Patient Active Problem List    Diagnosis Date Noted   • Hospital discharge follow-up 05/06/2021   • Pain in left leg 05/06/2021   • Abdominal wall pain in right flank 01/05/2021   • Elevated fasting blood sugar 01/05/2021   • High triglycerides 12/20/2019   • Vitamin D deficiency 12/20/2019   • DVT (deep venous thrombosis) (HCC) 12/06/2019   • Insomnia 12/06/2019   • Anxiety and  depression 2019   • Annual physical exam 2019   • Multiple lung nodules on CT 2019        Allergies: Patient has no known allergies.    Current Outpatient Medications   Medication Sig Dispense Refill   • busPIRone (BUSPAR) 10 MG Tab tablet Take 10 mg by mouth 3 times a day.     • traZODone (DESYREL) 50 MG Tab TAKE ONE HALF TO TWO TABLETS BY MOUTH 1 TO 2 HOURS BEFORE BEDTIME AS NEEDED FOR SLEEP START WITH ONE HALF TABLET     • dicyclomine (BENTYL) 10 MG Cap Take 10 mg by mouth 4 Times a Day,Before Meals and at Bedtime.     • sucralfate (CARAFATE) 1 GM Tab Take 1 tablet by mouth 4 Times a Day,Before Meals and at Bedtime. 120 tablet 3   • omeprazole (PRILOSEC) 20 MG delayed-release capsule Take 1 capsule by mouth every day. 30 capsule 1   • FLUoxetine (PROZAC) 20 MG Cap Take 1 Cap by mouth every day. 30 Cap 1   • rosuvastatin (CRESTOR) 10 MG Tab TAKE 1 TABLET BY MOUTH EVERY DAY 90 Tab 1   • vitamin D, Ergocalciferol, (DRISDOL) 1.25 MG (11345 UT) Cap capsule TAKE 1 CAPSULE BY MOUTH EVERY 7 DAYS 12 Cap 3     No current facility-administered medications for this visit.       Social History     Tobacco Use   • Smoking status: Former Smoker     Packs/day: 0.50     Years: 5.00     Pack years: 2.50     Quit date: 2014     Years since quittin.4   • Smokeless tobacco: Never Used   • Tobacco comment: quit    Substance Use Topics   • Alcohol use: Yes     Alcohol/week: 0.6 oz     Types: 1 Glasses of wine per week     Comment: Occ   • Drug use: Yes     Frequency: 7.0 times per week     Types: Marijuana     Comment: CBD, inhaler     Social History     Social History Narrative   • Not on file       Family History   Problem Relation Age of Onset   • Heart Disease Mother    • Hypertension Mother    • Heart Disease Father    • Diabetes Father        Allergies, past medical history, past surgical history, family history, social history reviewed and updated.    Review of Systems:     - Constitutional:  Negative for fever, chills, unexpected weight change, and fatigue/generalized weakness.     - Respiratory: Negative for cough, sputum production, chest congestion, dyspnea, wheezing, and crackles.      - Cardiovascular: Negative for chest pain, palpitations, orthopnea, and bilateral lower extremity edema.     - Gastrointestinal: Heartburn, nausea, epigastric tenderness, normal pain, greasy stools.  Negative for vomiting, abdominal pain, hematochezia, melena, diarrhea, constipation, and /foul-smelling stools.    - Psychiatric/Behavioral: Negative for depression, suicidal/homicidal ideation and memory loss.      All other systems reviewed and are negative    Exam:    /89   Pulse 74   Temp 36.9 °C (98.5 °F) (Temporal)   Resp 12   Ht 1.829 m (6')   Wt 92.1 kg (203 lb)   SpO2 91%   BMI 27.53 kg/m²  Body mass index is 27.53 kg/m².    Physical Exam:  Constitutional: Well-developed and well-nourished. Not diaphoretic. No distress.   Cardiovascular: Regular rate and rhythm, S1 and S2 without murmur, rubs, or gallops.    Chest: Effort normal. Clear to auscultation throughout. No adventitious sounds. No CVA tenderness.  Abdomen: tenderness on palpation in epigastric and left mid region. Soft and without distention. Active bowel sounds in all four quadrants. No rebound, guarding, masses or HSM.  Musculoskeletal: No swelling, erythema or tenderness in left upper or lower extremity.  Neurological: Alert and oriented x 3.   Psychiatric:  Behavior, mood, and affect are appropriate.  MA/nursing note and vitals reviewed.    LABS: 2021, CT abd results reviewed and discussed with the patient, questions answered.    My total time spent caring for the patient on the day of the encounter was 25 minutes.   This does not include time spent on separately billable procedures/tests.     Assessment/Plan:  1. Hospital discharge follow-up  Reviewed records from [facility].  Discussed the findings and results with patient.   All questions answered.    2. Nausea  Uncontrolled.  Maintain current liquid diet.  - REFERRAL TO GASTROENTEROLOGY    3. Abdominal distension  - REFERRAL TO GASTROENTEROLOGY    4. Gastroesophageal reflux disease with esophagitis without hemorrhage  Uncontrolled.  Patient occasional etiology and potential risk factors.  Trial of Carafate and Prilosec.  - REFERRAL TO GASTROENTEROLOGY  - sucralfate (CARAFATE) 1 GM Tab; Take 1 tablet by mouth 4 Times a Day,Before Meals and at Bedtime.  Dispense: 120 tablet; Refill: 3  - omeprazole (PRILOSEC) 20 MG delayed-release capsule; Take 1 capsule by mouth every day.  Dispense: 30 capsule; Refill: 1    5. Elevated lipase  We will repeat labs.  Avoid or potential triggers, OTC medications and supplements, alcohol.  Stop omega-3 supplement, avoid saturated fats.  - CBC WITHOUT DIFFERENTIAL; Future  - Comp Metabolic Panel; Future  - Lipid Profile; Future  - LIPASE; Future  - AMYLASE; Future  - REFERRAL TO GASTROENTEROLOGY    6. Pain in left leg  No current symptomology.  Based on history, will obtain urgent ultrasound.  Patient to go to ED if symptoms return or get worse.  - US-EXTREMITY VENOUS UPPER UNILAT LEFT; Future       Discussed with patient possible alternative diagnoses, pt is to take all medications as prescribed. If symptoms persist FU w/PCP, if symptoms worsen go to emergency room. If experiencing any side effects from prescribed medications reports to the office immediately or go to emergency room.  Reviewed indication, dosage, usage and potential adverse effects of prescribed medications. Reviewed risks and benefits of treatment plan. Patient verbalizes understanding of all instruction and verbally agrees to plan.    No follow-ups on file. biannual

## 2021-05-08 ENCOUNTER — HOSPITAL ENCOUNTER (OUTPATIENT)
Dept: LAB | Facility: MEDICAL CENTER | Age: 41
End: 2021-05-08
Attending: NURSE PRACTITIONER
Payer: COMMERCIAL

## 2021-05-08 DIAGNOSIS — R74.8 ELEVATED LIPASE: ICD-10-CM

## 2021-05-08 LAB
ALBUMIN SERPL BCP-MCNC: 4.7 G/DL (ref 3.2–4.9)
ALBUMIN/GLOB SERPL: 1.8 G/DL
ALP SERPL-CCNC: 84 U/L (ref 30–99)
ALT SERPL-CCNC: 48 U/L (ref 2–50)
AMYLASE SERPL-CCNC: 44 U/L (ref 20–103)
ANION GAP SERPL CALC-SCNC: 9 MMOL/L (ref 7–16)
AST SERPL-CCNC: 30 U/L (ref 12–45)
BILIRUB SERPL-MCNC: 0.7 MG/DL (ref 0.1–1.5)
BUN SERPL-MCNC: 8 MG/DL (ref 8–22)
CALCIUM SERPL-MCNC: 9.5 MG/DL (ref 8.5–10.5)
CHLORIDE SERPL-SCNC: 103 MMOL/L (ref 96–112)
CHOLEST SERPL-MCNC: 133 MG/DL (ref 100–199)
CO2 SERPL-SCNC: 27 MMOL/L (ref 20–33)
CREAT SERPL-MCNC: 1.16 MG/DL (ref 0.5–1.4)
ERYTHROCYTE [DISTWIDTH] IN BLOOD BY AUTOMATED COUNT: 39.8 FL (ref 35.9–50)
FASTING STATUS PATIENT QL REPORTED: NORMAL
GLOBULIN SER CALC-MCNC: 2.6 G/DL (ref 1.9–3.5)
GLUCOSE SERPL-MCNC: 98 MG/DL (ref 65–99)
HCT VFR BLD AUTO: 48.6 % (ref 42–52)
HDLC SERPL-MCNC: 33 MG/DL
HGB BLD-MCNC: 16.6 G/DL (ref 14–18)
LDLC SERPL CALC-MCNC: 76 MG/DL
LIPASE SERPL-CCNC: 35 U/L (ref 11–82)
MCH RBC QN AUTO: 31.3 PG (ref 27–33)
MCHC RBC AUTO-ENTMCNC: 34.2 G/DL (ref 33.7–35.3)
MCV RBC AUTO: 91.5 FL (ref 81.4–97.8)
PLATELET # BLD AUTO: 311 K/UL (ref 164–446)
PMV BLD AUTO: 9.7 FL (ref 9–12.9)
POTASSIUM SERPL-SCNC: 4 MMOL/L (ref 3.6–5.5)
PROT SERPL-MCNC: 7.3 G/DL (ref 6–8.2)
RBC # BLD AUTO: 5.31 M/UL (ref 4.7–6.1)
SODIUM SERPL-SCNC: 139 MMOL/L (ref 135–145)
TRIGL SERPL-MCNC: 121 MG/DL (ref 0–149)
WBC # BLD AUTO: 7.6 K/UL (ref 4.8–10.8)

## 2021-05-08 PROCEDURE — 36415 COLL VENOUS BLD VENIPUNCTURE: CPT

## 2021-05-08 PROCEDURE — 80061 LIPID PANEL: CPT

## 2021-05-08 PROCEDURE — 85027 COMPLETE CBC AUTOMATED: CPT

## 2021-05-08 PROCEDURE — 80053 COMPREHEN METABOLIC PANEL: CPT

## 2021-05-08 PROCEDURE — 83690 ASSAY OF LIPASE: CPT

## 2021-05-08 PROCEDURE — 82150 ASSAY OF AMYLASE: CPT

## 2021-05-10 ENCOUNTER — TELEPHONE (OUTPATIENT)
Dept: MEDICAL GROUP | Facility: PHYSICIAN GROUP | Age: 41
End: 2021-05-10

## 2021-05-10 ENCOUNTER — HOSPITAL ENCOUNTER (EMERGENCY)
Facility: MEDICAL CENTER | Age: 41
End: 2021-05-10
Attending: EMERGENCY MEDICINE
Payer: COMMERCIAL

## 2021-05-10 ENCOUNTER — APPOINTMENT (OUTPATIENT)
Dept: RADIOLOGY | Facility: MEDICAL CENTER | Age: 41
End: 2021-05-10
Attending: EMERGENCY MEDICINE
Payer: COMMERCIAL

## 2021-05-10 VITALS
BODY MASS INDEX: 27.9 KG/M2 | TEMPERATURE: 98 F | SYSTOLIC BLOOD PRESSURE: 131 MMHG | WEIGHT: 206 LBS | HEART RATE: 55 BPM | HEIGHT: 72 IN | RESPIRATION RATE: 18 BRPM | OXYGEN SATURATION: 96 % | DIASTOLIC BLOOD PRESSURE: 92 MMHG

## 2021-05-10 DIAGNOSIS — K82.9 GALLBLADDER DISEASE: ICD-10-CM

## 2021-05-10 LAB
ALBUMIN SERPL BCP-MCNC: 4.3 G/DL (ref 3.2–4.9)
ALBUMIN/GLOB SERPL: 1.7 G/DL
ALP SERPL-CCNC: 77 U/L (ref 30–99)
ALT SERPL-CCNC: 39 U/L (ref 2–50)
ANION GAP SERPL CALC-SCNC: 10 MMOL/L (ref 7–16)
APPEARANCE UR: CLEAR
AST SERPL-CCNC: 11 U/L (ref 12–45)
BASOPHILS # BLD AUTO: 0.5 % (ref 0–1.8)
BASOPHILS # BLD: 0.04 K/UL (ref 0–0.12)
BILIRUB SERPL-MCNC: 0.5 MG/DL (ref 0.1–1.5)
BILIRUB UR QL STRIP.AUTO: NEGATIVE
BUN SERPL-MCNC: 8 MG/DL (ref 8–22)
CALCIUM SERPL-MCNC: 9.4 MG/DL (ref 8.5–10.5)
CHLORIDE SERPL-SCNC: 104 MMOL/L (ref 96–112)
CO2 SERPL-SCNC: 26 MMOL/L (ref 20–33)
COLOR UR: YELLOW
CREAT SERPL-MCNC: 1.11 MG/DL (ref 0.5–1.4)
EKG IMPRESSION: NORMAL
EOSINOPHIL # BLD AUTO: 0.08 K/UL (ref 0–0.51)
EOSINOPHIL NFR BLD: 0.9 % (ref 0–6.9)
ERYTHROCYTE [DISTWIDTH] IN BLOOD BY AUTOMATED COUNT: 40.2 FL (ref 35.9–50)
GLOBULIN SER CALC-MCNC: 2.6 G/DL (ref 1.9–3.5)
GLUCOSE SERPL-MCNC: 101 MG/DL (ref 65–99)
GLUCOSE UR STRIP.AUTO-MCNC: NEGATIVE MG/DL
HCT VFR BLD AUTO: 47.3 % (ref 42–52)
HGB BLD-MCNC: 16.2 G/DL (ref 14–18)
IMM GRANULOCYTES # BLD AUTO: 0.03 K/UL (ref 0–0.11)
IMM GRANULOCYTES NFR BLD AUTO: 0.4 % (ref 0–0.9)
KETONES UR STRIP.AUTO-MCNC: NEGATIVE MG/DL
LEUKOCYTE ESTERASE UR QL STRIP.AUTO: NEGATIVE
LIPASE SERPL-CCNC: 38 U/L (ref 11–82)
LYMPHOCYTES # BLD AUTO: 1.97 K/UL (ref 1–4.8)
LYMPHOCYTES NFR BLD: 23.1 % (ref 22–41)
MCH RBC QN AUTO: 31.3 PG (ref 27–33)
MCHC RBC AUTO-ENTMCNC: 34.2 G/DL (ref 33.7–35.3)
MCV RBC AUTO: 91.5 FL (ref 81.4–97.8)
MICRO URNS: NORMAL
MONOCYTES # BLD AUTO: 0.69 K/UL (ref 0–0.85)
MONOCYTES NFR BLD AUTO: 8.1 % (ref 0–13.4)
NEUTROPHILS # BLD AUTO: 5.72 K/UL (ref 1.82–7.42)
NEUTROPHILS NFR BLD: 67 % (ref 44–72)
NITRITE UR QL STRIP.AUTO: NEGATIVE
NRBC # BLD AUTO: 0 K/UL
NRBC BLD-RTO: 0 /100 WBC
PH UR STRIP.AUTO: 7 [PH] (ref 5–8)
PLATELET # BLD AUTO: 295 K/UL (ref 164–446)
PMV BLD AUTO: 9.7 FL (ref 9–12.9)
POTASSIUM SERPL-SCNC: 4 MMOL/L (ref 3.6–5.5)
PROT SERPL-MCNC: 6.9 G/DL (ref 6–8.2)
PROT UR QL STRIP: NEGATIVE MG/DL
RBC # BLD AUTO: 5.17 M/UL (ref 4.7–6.1)
RBC UR QL AUTO: NEGATIVE
SODIUM SERPL-SCNC: 140 MMOL/L (ref 135–145)
SP GR UR STRIP.AUTO: 1.01
UROBILINOGEN UR STRIP.AUTO-MCNC: 0.2 MG/DL
WBC # BLD AUTO: 8.5 K/UL (ref 4.8–10.8)

## 2021-05-10 PROCEDURE — 80053 COMPREHEN METABOLIC PANEL: CPT

## 2021-05-10 PROCEDURE — 36415 COLL VENOUS BLD VENIPUNCTURE: CPT

## 2021-05-10 PROCEDURE — 700105 HCHG RX REV CODE 258: Performed by: EMERGENCY MEDICINE

## 2021-05-10 PROCEDURE — 99285 EMERGENCY DEPT VISIT HI MDM: CPT

## 2021-05-10 PROCEDURE — 83690 ASSAY OF LIPASE: CPT

## 2021-05-10 PROCEDURE — 76705 ECHO EXAM OF ABDOMEN: CPT

## 2021-05-10 PROCEDURE — 85025 COMPLETE CBC W/AUTO DIFF WBC: CPT

## 2021-05-10 PROCEDURE — 93005 ELECTROCARDIOGRAM TRACING: CPT | Performed by: EMERGENCY MEDICINE

## 2021-05-10 PROCEDURE — 93005 ELECTROCARDIOGRAM TRACING: CPT

## 2021-05-10 PROCEDURE — 700111 HCHG RX REV CODE 636 W/ 250 OVERRIDE (IP): Performed by: EMERGENCY MEDICINE

## 2021-05-10 PROCEDURE — 81003 URINALYSIS AUTO W/O SCOPE: CPT

## 2021-05-10 PROCEDURE — 96375 TX/PRO/DX INJ NEW DRUG ADDON: CPT

## 2021-05-10 PROCEDURE — 96374 THER/PROPH/DIAG INJ IV PUSH: CPT

## 2021-05-10 RX ORDER — SODIUM CHLORIDE 9 MG/ML
1000 INJECTION, SOLUTION INTRAVENOUS ONCE
Status: COMPLETED | OUTPATIENT
Start: 2021-05-10 | End: 2021-05-10

## 2021-05-10 RX ORDER — MORPHINE SULFATE 4 MG/ML
4 INJECTION, SOLUTION INTRAMUSCULAR; INTRAVENOUS ONCE
Status: COMPLETED | OUTPATIENT
Start: 2021-05-10 | End: 2021-05-10

## 2021-05-10 RX ORDER — ONDANSETRON 4 MG/1
4 TABLET, ORALLY DISINTEGRATING ORAL EVERY 8 HOURS PRN
Qty: 10 TABLET | Refills: 0 | Status: SHIPPED | OUTPATIENT
Start: 2021-05-10 | End: 2022-04-05

## 2021-05-10 RX ORDER — OXYCODONE HYDROCHLORIDE AND ACETAMINOPHEN 5; 325 MG/1; MG/1
1 TABLET ORAL EVERY 6 HOURS PRN
Qty: 15 TABLET | Refills: 0 | Status: SHIPPED | OUTPATIENT
Start: 2021-05-10 | End: 2021-05-14

## 2021-05-10 RX ORDER — ONDANSETRON 2 MG/ML
4 INJECTION INTRAMUSCULAR; INTRAVENOUS ONCE
Status: COMPLETED | OUTPATIENT
Start: 2021-05-10 | End: 2021-05-10

## 2021-05-10 RX ADMIN — MORPHINE SULFATE 4 MG: 4 INJECTION INTRAVENOUS at 21:14

## 2021-05-10 RX ADMIN — ONDANSETRON 4 MG: 2 INJECTION INTRAMUSCULAR; INTRAVENOUS at 21:14

## 2021-05-10 RX ADMIN — SODIUM CHLORIDE 1000 ML: 9 INJECTION, SOLUTION INTRAVENOUS at 21:14

## 2021-05-10 ASSESSMENT — PAIN DESCRIPTION - PAIN TYPE
TYPE: ACUTE PAIN
TYPE: ACUTE PAIN

## 2021-05-10 ASSESSMENT — FIBROSIS 4 INDEX: FIB4 SCORE: 0.54

## 2021-05-10 ASSESSMENT — LIFESTYLE VARIABLES: DO YOU DRINK ALCOHOL: NO

## 2021-05-10 NOTE — ED TRIAGE NOTES
RUQ pain x 1 year, worse x 1 week with n/v.  Went to another ER last week, told he has elevated lipase and to follow up with PMD.  Called PMD today, told to go to ER due to yellowed sclera.

## 2021-05-10 NOTE — TELEPHONE ENCOUNTER
Patient's wife called the office in regards to her . She states that she thinks that he may have a gallbladder issue due to him having symptoms. She states that he has been having nausea and vomitting, pain in his abdomen that goes to his chest, fever, chills, and is always sick in the morning. Patient also has been having jaundice and has been yellow. Went ahead and ask charo our RN and osito and they both said to take him to the ER due to him having jaundice and the chills. I went ahead and transferred to charo and patient's wife will go ahead and take him to the ER and his gastro referral was changed to STAT.

## 2021-05-11 NOTE — ED NOTES
Pt family member informed Triage that Pt is starting to become more nauseas and dry heaving. Triage RN advised

## 2021-05-11 NOTE — ED NOTES
Pt verbalizes understanding of discharge instructions and prescriptions. Pt understands to follow up with GI surgery. Pt able to ambulat out of ed with a steady gait and all belongings.

## 2021-05-11 NOTE — ED PROVIDER NOTES
ED Provider Note    CHIEF COMPLAINT  Chief Complaint   Patient presents with   • Abdominal Pain       HPI  Robert Dahl is a 41 y.o. male here for evaluation of abdominal pain.  The patient states that he has been having this pain intermittently, and it is in the right upper quadrant and epigastrium.  Does not radiate to the back.  He has no chest pain or shortness of breath.  Patient states he was seen and evaluated at [facility] this last week for the same, and he had a CT scan done.  He said this was normal, and he was sent home.  Patient is here because he would like to have an ultrasound done.  He lives currently out of town.  Patient has not taken anything prior to arrival for the discomfort or pain.  He states that the pain is worse with food and does alleviate after couple of hours, but then returns.  He has seen a GI doctor in the past, but nothing recently.  He also was noted to have an elevated lipase and would like that checked as well.    ROS;  Please see HPI  O/W negative     PAST MEDICAL HISTORY   has a past medical history of DVT (deep venous thrombosis) (HCC) and Hyperlipidemia.    SOCIAL HISTORY  Social History     Tobacco Use   • Smoking status: Former Smoker     Packs/day: 0.50     Years: 5.00     Pack years: 2.50     Quit date: 2014     Years since quittin.4   • Smokeless tobacco: Never Used   • Tobacco comment: quit    Substance and Sexual Activity   • Alcohol use: Yes     Alcohol/week: 0.6 oz     Types: 1 Glasses of wine per week     Comment: Occ   • Drug use: Yes     Frequency: 7.0 times per week     Types: Marijuana     Comment: CBD, inhaler   • Sexual activity: Yes     Partners: Female       SURGICAL HISTORY  patient denies any surgical history    CURRENT MEDICATIONS  Home Medications    **Home medications have not yet been reviewed for this encounter**         ALLERGIES  No Known Allergies    REVIEW OF SYSTEMS  See HPI for further details. Review of systems as above,  otherwise all other systems are negative.     PHYSICAL EXAM  VITAL SIGNS: /92   Pulse 63   Temp 36.6 °C (97.9 °F) (Temporal)   Resp 15   Ht 1.829 m (6')   Wt 93.4 kg (206 lb)   SpO2 95%   BMI 27.94 kg/m²     Constitutional: Well developed, well nourished. No acute distress.  HEENT: Normocephalic, atraumatic. MMM, no scleral icterus  Neck: Supple, Full range of motion   Chest/Pulmonary:  No respiratory distress.  Equal expansion   Abdomen; soft, mild epigastric tenderness, no guarding.  Musculoskeletal: No deformity, no edema, neurovascular intact.   Neuro: Clear speech, appropriate, cooperative, cranial nerves II-XII grossly intact.  Psych: Normal mood and affect  Skin; warm and dry, no petechial rash.  No jaundice      PROCEDURES     MEDICAL RECORD  I have reviewed patient's medical record and pertinent results are listed.    COURSE & MEDICAL DECISION MAKING  I have reviewed any medical record information, laboratory studies and radiographic results as noted above.    I you have had any blood pressure issues while here in the emergency department, please see your doctor for a further evaluation or work up.    Results for orders placed or performed during the hospital encounter of 05/10/21   CBC WITH DIFFERENTIAL   Result Value Ref Range    WBC 8.5 4.8 - 10.8 K/uL    RBC 5.17 4.70 - 6.10 M/uL    Hemoglobin 16.2 14.0 - 18.0 g/dL    Hematocrit 47.3 42.0 - 52.0 %    MCV 91.5 81.4 - 97.8 fL    MCH 31.3 27.0 - 33.0 pg    MCHC 34.2 33.7 - 35.3 g/dL    RDW 40.2 35.9 - 50.0 fL    Platelet Count 295 164 - 446 K/uL    MPV 9.7 9.0 - 12.9 fL    Neutrophils-Polys 67.00 44.00 - 72.00 %    Lymphocytes 23.10 22.00 - 41.00 %    Monocytes 8.10 0.00 - 13.40 %    Eosinophils 0.90 0.00 - 6.90 %    Basophils 0.50 0.00 - 1.80 %    Immature Granulocytes 0.40 0.00 - 0.90 %    Nucleated RBC 0.00 /100 WBC    Neutrophils (Absolute) 5.72 1.82 - 7.42 K/uL    Lymphs (Absolute) 1.97 1.00 - 4.80 K/uL    Monos (Absolute) 0.69 0.00 -  0.85 K/uL    Eos (Absolute) 0.08 0.00 - 0.51 K/uL    Baso (Absolute) 0.04 0.00 - 0.12 K/uL    Immature Granulocytes (abs) 0.03 0.00 - 0.11 K/uL    NRBC (Absolute) 0.00 K/uL   COMP METABOLIC PANEL   Result Value Ref Range    Sodium 140 135 - 145 mmol/L    Potassium 4.0 3.6 - 5.5 mmol/L    Chloride 104 96 - 112 mmol/L    Co2 26 20 - 33 mmol/L    Anion Gap 10.0 7.0 - 16.0    Glucose 101 (H) 65 - 99 mg/dL    Bun 8 8 - 22 mg/dL    Creatinine 1.11 0.50 - 1.40 mg/dL    Calcium 9.4 8.5 - 10.5 mg/dL    AST(SGOT) 11 (L) 12 - 45 U/L    ALT(SGPT) 39 2 - 50 U/L    Alkaline Phosphatase 77 30 - 99 U/L    Total Bilirubin 0.5 0.1 - 1.5 mg/dL    Albumin 4.3 3.2 - 4.9 g/dL    Total Protein 6.9 6.0 - 8.2 g/dL    Globulin 2.6 1.9 - 3.5 g/dL    A-G Ratio 1.7 g/dL   LIPASE   Result Value Ref Range    Lipase 38 11 - 82 U/L   URINALYSIS    Specimen: Urine   Result Value Ref Range    Color Yellow     Character Clear     Specific Gravity 1.006 <1.035    Ph 7.0 5.0 - 8.0    Glucose Negative Negative mg/dL    Ketones Negative Negative mg/dL    Protein Negative Negative mg/dL    Bilirubin Negative Negative    Urobilinogen, Urine 0.2 Negative    Nitrite Negative Negative    Leukocyte Esterase Negative Negative    Occult Blood Negative Negative    Micro Urine Req see below    ESTIMATED GFR   Result Value Ref Range    GFR If African American >60 >60 mL/min/1.73 m 2    GFR If Non African American >60 >60 mL/min/1.73 m 2   EKG (NOW)   Result Value Ref Range    Report       [facility] Emergency Dept.    Test Date:  2021-05-10  Pt Name:    KAYLIN BUTLER              Department: ER  MRN:        [id number]                      Room:  Gender:     Male                         Technician: [id number]  :        1980                   Requested By:ER TRIAGE PROTOCOL  Order #:    [id number]                    Kai MD:    Measurements  Intervals                                Axis  Rate:       69                           P:           56  MS:         172                          QRS:        42  QRSD:       80                           T:          42  QT:         388  QTc:        416    Interpretive Statements  SINUS RHYTHM  No previous ECG available for comparison       US-RUQ   Final Result         1.  Hepatomegaly with coarse echogenic cortex, appearance compatible with fatty change versus fibrosis.   2.  Echogenic foci along the gallbladder wall, appearance suggests tumefactive sludge ball and/or gallbladder polyp        Ekg; nsr 69. No st elevation, no st depression, qtc 416.  No comparison     Differential diagnoses include but not limited to: cholelithiasis, cholecystitis     This patient presents with gallbladder .  At this time, I have counseled the patient/family regarding their medications, pain control, and follow up.  They will continue their medications, if any, as prescribed.  They will return immediately for any worsening symptoms and/or any other medical concerns.  They will see their doctor, or contact the doctor provided, in 1-2 days for follow up.       FINAL IMPRESSION  1. Gallbladder disease  oxyCODONE-acetaminophen (PERCOCET) 5-325 MG Tab           Electronically signed by: Jomar Marte D.O., 5/10/2021 9:06 PM

## 2021-05-24 ENCOUNTER — PRE-ADMISSION TESTING (OUTPATIENT)
Dept: ADMISSIONS | Facility: MEDICAL CENTER | Age: 41
End: 2021-05-24
Attending: SURGERY
Payer: COMMERCIAL

## 2021-05-24 RX ORDER — OXYCODONE HYDROCHLORIDE AND ACETAMINOPHEN 5; 325 MG/1; MG/1
1 TABLET ORAL PRN
Status: ON HOLD | COMMUNITY
End: 2021-05-25

## 2021-05-25 ENCOUNTER — HOSPITAL ENCOUNTER (OUTPATIENT)
Facility: MEDICAL CENTER | Age: 41
End: 2021-05-25
Attending: SURGERY | Admitting: SURGERY
Payer: COMMERCIAL

## 2021-05-25 ENCOUNTER — ANESTHESIA (OUTPATIENT)
Dept: SURGERY | Facility: MEDICAL CENTER | Age: 41
End: 2021-05-25
Payer: COMMERCIAL

## 2021-05-25 ENCOUNTER — ANESTHESIA EVENT (OUTPATIENT)
Dept: SURGERY | Facility: MEDICAL CENTER | Age: 41
End: 2021-05-25
Payer: COMMERCIAL

## 2021-05-25 VITALS
HEART RATE: 65 BPM | BODY MASS INDEX: 28.19 KG/M2 | TEMPERATURE: 98 F | RESPIRATION RATE: 16 BRPM | OXYGEN SATURATION: 93 % | HEIGHT: 72 IN | DIASTOLIC BLOOD PRESSURE: 60 MMHG | SYSTOLIC BLOOD PRESSURE: 107 MMHG | WEIGHT: 208.11 LBS

## 2021-05-25 DIAGNOSIS — G89.18 POSTOPERATIVE PAIN: ICD-10-CM

## 2021-05-25 LAB
PATHOLOGY CONSULT NOTE: NORMAL
SARS-COV+SARS-COV-2 AG RESP QL IA.RAPID: NOTDETECTED
SARS-COV-2 RNA RESP QL NAA+PROBE: NOTDETECTED
SPECIMEN SOURCE: NORMAL
SPECIMEN SOURCE: NORMAL

## 2021-05-25 PROCEDURE — 501399 HCHG SPECIMAN BAG, ENDO CATC: Performed by: SURGERY

## 2021-05-25 PROCEDURE — 160035 HCHG PACU - 1ST 60 MINS PHASE I: Performed by: SURGERY

## 2021-05-25 PROCEDURE — 160047 HCHG PACU  - EA ADDL 30 MINS PHASE II: Performed by: SURGERY

## 2021-05-25 PROCEDURE — 500002 HCHG ADHESIVE, DERMABOND: Performed by: SURGERY

## 2021-05-25 PROCEDURE — A9270 NON-COVERED ITEM OR SERVICE: HCPCS | Performed by: SURGERY

## 2021-05-25 PROCEDURE — 160036 HCHG PACU - EA ADDL 30 MINS PHASE I: Performed by: SURGERY

## 2021-05-25 PROCEDURE — A9270 NON-COVERED ITEM OR SERVICE: HCPCS | Performed by: STUDENT IN AN ORGANIZED HEALTH CARE EDUCATION/TRAINING PROGRAM

## 2021-05-25 PROCEDURE — 501838 HCHG SUTURE GENERAL: Performed by: SURGERY

## 2021-05-25 PROCEDURE — 160002 HCHG RECOVERY MINUTES (STAT): Performed by: SURGERY

## 2021-05-25 PROCEDURE — 700101 HCHG RX REV CODE 250: Performed by: SURGERY

## 2021-05-25 PROCEDURE — 160046 HCHG PACU - 1ST 60 MINS PHASE II: Performed by: SURGERY

## 2021-05-25 PROCEDURE — 160041 HCHG SURGERY MINUTES - EA ADDL 1 MIN LEVEL 4: Performed by: SURGERY

## 2021-05-25 PROCEDURE — 501570 HCHG TROCAR, SEPARATOR: Performed by: SURGERY

## 2021-05-25 PROCEDURE — 700102 HCHG RX REV CODE 250 W/ 637 OVERRIDE(OP): Performed by: STUDENT IN AN ORGANIZED HEALTH CARE EDUCATION/TRAINING PROGRAM

## 2021-05-25 PROCEDURE — 160025 RECOVERY II MINUTES (STATS): Performed by: SURGERY

## 2021-05-25 PROCEDURE — 700111 HCHG RX REV CODE 636 W/ 250 OVERRIDE (IP): Performed by: STUDENT IN AN ORGANIZED HEALTH CARE EDUCATION/TRAINING PROGRAM

## 2021-05-25 PROCEDURE — 501568 HCHG TROCAR, BLUNTPORT 12MM: Performed by: SURGERY

## 2021-05-25 PROCEDURE — 500514 HCHG ENDOCLIP: Performed by: SURGERY

## 2021-05-25 PROCEDURE — 160029 HCHG SURGERY MINUTES - 1ST 30 MINS LEVEL 4: Performed by: SURGERY

## 2021-05-25 PROCEDURE — 700101 HCHG RX REV CODE 250: Performed by: STUDENT IN AN ORGANIZED HEALTH CARE EDUCATION/TRAINING PROGRAM

## 2021-05-25 PROCEDURE — 501583 HCHG TROCAR, THRD CAN&SEAL 5X100: Performed by: SURGERY

## 2021-05-25 PROCEDURE — 700105 HCHG RX REV CODE 258: Performed by: SURGERY

## 2021-05-25 PROCEDURE — 88304 TISSUE EXAM BY PATHOLOGIST: CPT

## 2021-05-25 PROCEDURE — 160048 HCHG OR STATISTICAL LEVEL 1-5: Performed by: SURGERY

## 2021-05-25 PROCEDURE — U0003 INFECTIOUS AGENT DETECTION BY NUCLEIC ACID (DNA OR RNA); SEVERE ACUTE RESPIRATORY SYNDROME CORONAVIRUS 2 (SARS-COV-2) (CORONAVIRUS DISEASE [COVID-19]), AMPLIFIED PROBE TECHNIQUE, MAKING USE OF HIGH THROUGHPUT TECHNOLOGIES AS DESCRIBED BY CMS-2020-01-R: HCPCS

## 2021-05-25 PROCEDURE — 87426 SARSCOV CORONAVIRUS AG IA: CPT

## 2021-05-25 PROCEDURE — 700105 HCHG RX REV CODE 258: Performed by: STUDENT IN AN ORGANIZED HEALTH CARE EDUCATION/TRAINING PROGRAM

## 2021-05-25 PROCEDURE — U0005 INFEC AGEN DETEC AMPLI PROBE: HCPCS

## 2021-05-25 PROCEDURE — 160009 HCHG ANES TIME/MIN: Performed by: SURGERY

## 2021-05-25 RX ORDER — DIPHENHYDRAMINE HYDROCHLORIDE 50 MG/ML
25 INJECTION INTRAMUSCULAR; INTRAVENOUS EVERY 6 HOURS PRN
Status: DISCONTINUED | OUTPATIENT
Start: 2021-05-25 | End: 2021-05-25 | Stop reason: HOSPADM

## 2021-05-25 RX ORDER — BUPIVACAINE HYDROCHLORIDE 5 MG/ML
INJECTION, SOLUTION EPIDURAL; INTRACAUDAL
Status: DISCONTINUED
Start: 2021-05-25 | End: 2021-05-25 | Stop reason: HOSPADM

## 2021-05-25 RX ORDER — DIPHENHYDRAMINE HYDROCHLORIDE 50 MG/ML
12.5 INJECTION INTRAMUSCULAR; INTRAVENOUS
Status: DISCONTINUED | OUTPATIENT
Start: 2021-05-25 | End: 2021-05-25 | Stop reason: HOSPADM

## 2021-05-25 RX ORDER — SODIUM CHLORIDE, SODIUM LACTATE, POTASSIUM CHLORIDE, CALCIUM CHLORIDE 600; 310; 30; 20 MG/100ML; MG/100ML; MG/100ML; MG/100ML
INJECTION, SOLUTION INTRAVENOUS CONTINUOUS
Status: CANCELLED | OUTPATIENT
Start: 2021-05-25 | End: 2021-05-25

## 2021-05-25 RX ORDER — HYDROMORPHONE HYDROCHLORIDE 1 MG/ML
0.1 INJECTION, SOLUTION INTRAMUSCULAR; INTRAVENOUS; SUBCUTANEOUS
Status: DISCONTINUED | OUTPATIENT
Start: 2021-05-25 | End: 2021-05-25 | Stop reason: HOSPADM

## 2021-05-25 RX ORDER — OXYCODONE HYDROCHLORIDE 5 MG/1
5 TABLET ORAL EVERY 4 HOURS PRN
Qty: 12 TABLET | Refills: 0 | Status: SHIPPED | OUTPATIENT
Start: 2021-05-25 | End: 2021-05-28

## 2021-05-25 RX ORDER — DEXAMETHASONE SODIUM PHOSPHATE 4 MG/ML
INJECTION, SOLUTION INTRA-ARTICULAR; INTRALESIONAL; INTRAMUSCULAR; INTRAVENOUS; SOFT TISSUE PRN
Status: DISCONTINUED | OUTPATIENT
Start: 2021-05-25 | End: 2021-05-25 | Stop reason: SURG

## 2021-05-25 RX ORDER — DIPHENHYDRAMINE HCL 25 MG
25 TABLET ORAL EVERY 6 HOURS PRN
Status: DISCONTINUED | OUTPATIENT
Start: 2021-05-25 | End: 2021-05-25 | Stop reason: HOSPADM

## 2021-05-25 RX ORDER — DOCUSATE SODIUM 100 MG/1
100 CAPSULE, LIQUID FILLED ORAL 2 TIMES DAILY
Qty: 60 CAPSULE | Refills: 0 | Status: SHIPPED | OUTPATIENT
Start: 2021-05-25 | End: 2022-02-03

## 2021-05-25 RX ORDER — ACETAMINOPHEN 325 MG/1
650 TABLET ORAL EVERY 6 HOURS
Qty: 60 TABLET | Refills: 0 | Status: SHIPPED | OUTPATIENT
Start: 2021-05-25 | End: 2022-02-03

## 2021-05-25 RX ORDER — MIDAZOLAM HYDROCHLORIDE 1 MG/ML
INJECTION INTRAMUSCULAR; INTRAVENOUS PRN
Status: DISCONTINUED | OUTPATIENT
Start: 2021-05-25 | End: 2021-05-25 | Stop reason: SURG

## 2021-05-25 RX ORDER — ONDANSETRON 2 MG/ML
4 INJECTION INTRAMUSCULAR; INTRAVENOUS
Status: DISCONTINUED | OUTPATIENT
Start: 2021-05-25 | End: 2021-05-25 | Stop reason: HOSPADM

## 2021-05-25 RX ORDER — SODIUM CHLORIDE, SODIUM LACTATE, POTASSIUM CHLORIDE, CALCIUM CHLORIDE 600; 310; 30; 20 MG/100ML; MG/100ML; MG/100ML; MG/100ML
INJECTION, SOLUTION INTRAVENOUS
Status: DISCONTINUED | OUTPATIENT
Start: 2021-05-25 | End: 2021-05-25 | Stop reason: HOSPADM

## 2021-05-25 RX ORDER — OXYCODONE HCL 5 MG/5 ML
5 SOLUTION, ORAL ORAL
Status: COMPLETED | OUTPATIENT
Start: 2021-05-25 | End: 2021-05-25

## 2021-05-25 RX ORDER — ACETAMINOPHEN 325 MG/1
TABLET ORAL PRN
Status: DISCONTINUED | OUTPATIENT
Start: 2021-05-25 | End: 2021-05-25 | Stop reason: SURG

## 2021-05-25 RX ORDER — CEFAZOLIN SODIUM 1 G/3ML
INJECTION, POWDER, FOR SOLUTION INTRAMUSCULAR; INTRAVENOUS PRN
Status: DISCONTINUED | OUTPATIENT
Start: 2021-05-25 | End: 2021-05-25 | Stop reason: SURG

## 2021-05-25 RX ORDER — IBUPROFEN 600 MG/1
600 TABLET ORAL EVERY 6 HOURS
Qty: 45 TABLET | Refills: 0 | Status: SHIPPED | OUTPATIENT
Start: 2021-05-25 | End: 2022-02-03

## 2021-05-25 RX ORDER — HYDROMORPHONE HYDROCHLORIDE 1 MG/ML
0.2 INJECTION, SOLUTION INTRAMUSCULAR; INTRAVENOUS; SUBCUTANEOUS
Status: DISCONTINUED | OUTPATIENT
Start: 2021-05-25 | End: 2021-05-25 | Stop reason: HOSPADM

## 2021-05-25 RX ORDER — LIDOCAINE HYDROCHLORIDE 20 MG/ML
INJECTION, SOLUTION EPIDURAL; INFILTRATION; INTRACAUDAL; PERINEURAL PRN
Status: DISCONTINUED | OUTPATIENT
Start: 2021-05-25 | End: 2021-05-25 | Stop reason: SURG

## 2021-05-25 RX ORDER — ROCURONIUM BROMIDE 10 MG/ML
INJECTION, SOLUTION INTRAVENOUS PRN
Status: DISCONTINUED | OUTPATIENT
Start: 2021-05-25 | End: 2021-05-25 | Stop reason: SURG

## 2021-05-25 RX ORDER — ONDANSETRON 2 MG/ML
INJECTION INTRAMUSCULAR; INTRAVENOUS PRN
Status: DISCONTINUED | OUTPATIENT
Start: 2021-05-25 | End: 2021-05-25 | Stop reason: SURG

## 2021-05-25 RX ORDER — SODIUM CHLORIDE, SODIUM LACTATE, POTASSIUM CHLORIDE, CALCIUM CHLORIDE 600; 310; 30; 20 MG/100ML; MG/100ML; MG/100ML; MG/100ML
INJECTION, SOLUTION INTRAVENOUS CONTINUOUS
Status: ACTIVE | OUTPATIENT
Start: 2021-05-25 | End: 2021-05-25

## 2021-05-25 RX ORDER — BUPIVACAINE HYDROCHLORIDE AND EPINEPHRINE 5; 5 MG/ML; UG/ML
INJECTION, SOLUTION EPIDURAL; INTRACAUDAL; PERINEURAL
Status: DISCONTINUED | OUTPATIENT
Start: 2021-05-25 | End: 2021-05-25 | Stop reason: HOSPADM

## 2021-05-25 RX ORDER — HYDROMORPHONE HYDROCHLORIDE 1 MG/ML
0.4 INJECTION, SOLUTION INTRAMUSCULAR; INTRAVENOUS; SUBCUTANEOUS
Status: DISCONTINUED | OUTPATIENT
Start: 2021-05-25 | End: 2021-05-25 | Stop reason: HOSPADM

## 2021-05-25 RX ORDER — KETOROLAC TROMETHAMINE 30 MG/ML
INJECTION, SOLUTION INTRAMUSCULAR; INTRAVENOUS PRN
Status: DISCONTINUED | OUTPATIENT
Start: 2021-05-25 | End: 2021-05-25 | Stop reason: SURG

## 2021-05-25 RX ORDER — HYDROMORPHONE HYDROCHLORIDE 2 MG/ML
INJECTION, SOLUTION INTRAMUSCULAR; INTRAVENOUS; SUBCUTANEOUS PRN
Status: DISCONTINUED | OUTPATIENT
Start: 2021-05-25 | End: 2021-05-25 | Stop reason: SURG

## 2021-05-25 RX ORDER — HALOPERIDOL 5 MG/ML
1 INJECTION INTRAMUSCULAR
Status: DISCONTINUED | OUTPATIENT
Start: 2021-05-25 | End: 2021-05-25 | Stop reason: HOSPADM

## 2021-05-25 RX ORDER — SODIUM CHLORIDE, SODIUM LACTATE, POTASSIUM CHLORIDE, CALCIUM CHLORIDE 600; 310; 30; 20 MG/100ML; MG/100ML; MG/100ML; MG/100ML
INJECTION, SOLUTION INTRAVENOUS
Status: DISCONTINUED | OUTPATIENT
Start: 2021-05-25 | End: 2021-05-25 | Stop reason: SURG

## 2021-05-25 RX ORDER — OXYCODONE HCL 5 MG/5 ML
10 SOLUTION, ORAL ORAL
Status: COMPLETED | OUTPATIENT
Start: 2021-05-25 | End: 2021-05-25

## 2021-05-25 RX ORDER — ACETAMINOPHEN 500 MG
TABLET ORAL
Status: COMPLETED
Start: 2021-05-25 | End: 2021-05-25

## 2021-05-25 RX ADMIN — HYDROMORPHONE HYDROCHLORIDE 0.2 MG: 1 INJECTION, SOLUTION INTRAMUSCULAR; INTRAVENOUS; SUBCUTANEOUS at 16:09

## 2021-05-25 RX ADMIN — CEFAZOLIN 2 G: 330 INJECTION, POWDER, FOR SOLUTION INTRAMUSCULAR; INTRAVENOUS at 12:55

## 2021-05-25 RX ADMIN — HYDROMORPHONE HYDROCHLORIDE 0.2 MG: 1 INJECTION, SOLUTION INTRAMUSCULAR; INTRAVENOUS; SUBCUTANEOUS at 14:30

## 2021-05-25 RX ADMIN — PROPOFOL 200 MG: 10 INJECTION, EMULSION INTRAVENOUS at 12:55

## 2021-05-25 RX ADMIN — HYDROMORPHONE HYDROCHLORIDE 0.5 MG: 2 INJECTION, SOLUTION INTRAMUSCULAR; INTRAVENOUS; SUBCUTANEOUS at 13:13

## 2021-05-25 RX ADMIN — ONDANSETRON 4 MG: 2 INJECTION INTRAMUSCULAR; INTRAVENOUS at 13:02

## 2021-05-25 RX ADMIN — DEXAMETHASONE SODIUM PHOSPHATE 4 MG: 4 INJECTION, SOLUTION INTRA-ARTICULAR; INTRALESIONAL; INTRAMUSCULAR; INTRAVENOUS; SOFT TISSUE at 13:02

## 2021-05-25 RX ADMIN — OXYCODONE HYDROCHLORIDE 10 MG: 5 SOLUTION ORAL at 13:55

## 2021-05-25 RX ADMIN — MIDAZOLAM HYDROCHLORIDE 2 MG: 1 INJECTION, SOLUTION INTRAMUSCULAR; INTRAVENOUS at 12:51

## 2021-05-25 RX ADMIN — HYDROMORPHONE HYDROCHLORIDE 0.2 MG: 1 INJECTION, SOLUTION INTRAMUSCULAR; INTRAVENOUS; SUBCUTANEOUS at 15:22

## 2021-05-25 RX ADMIN — KETOROLAC TROMETHAMINE 15 MG: 30 INJECTION, SOLUTION INTRAMUSCULAR at 13:29

## 2021-05-25 RX ADMIN — SODIUM CHLORIDE, POTASSIUM CHLORIDE, SODIUM LACTATE AND CALCIUM CHLORIDE: 600; 310; 30; 20 INJECTION, SOLUTION INTRAVENOUS at 12:50

## 2021-05-25 RX ADMIN — SUGAMMADEX 200 MG: 100 INJECTION, SOLUTION INTRAVENOUS at 13:32

## 2021-05-25 RX ADMIN — ACETAMINOPHEN 500 MG: 325 TABLET, FILM COATED ORAL at 12:46

## 2021-05-25 RX ADMIN — FENTANYL CITRATE 100 MCG: 50 INJECTION, SOLUTION INTRAMUSCULAR; INTRAVENOUS at 12:55

## 2021-05-25 RX ADMIN — POVIDONE IODINE 15 ML: 100 SOLUTION TOPICAL at 08:12

## 2021-05-25 RX ADMIN — SODIUM CHLORIDE, POTASSIUM CHLORIDE, SODIUM LACTATE AND CALCIUM CHLORIDE: 600; 310; 30; 20 INJECTION, SOLUTION INTRAVENOUS at 08:10

## 2021-05-25 RX ADMIN — FENTANYL CITRATE 50 MCG: 50 INJECTION, SOLUTION INTRAMUSCULAR; INTRAVENOUS at 13:55

## 2021-05-25 RX ADMIN — ROCURONIUM BROMIDE 40 MG: 10 INJECTION, SOLUTION INTRAVENOUS at 12:55

## 2021-05-25 RX ADMIN — LIDOCAINE HYDROCHLORIDE 100 MG: 20 INJECTION, SOLUTION EPIDURAL; INFILTRATION; INTRACAUDAL at 12:55

## 2021-05-25 RX ADMIN — FENTANYL CITRATE 50 MCG: 50 INJECTION, SOLUTION INTRAMUSCULAR; INTRAVENOUS at 14:05

## 2021-05-25 RX ADMIN — HYDROMORPHONE HYDROCHLORIDE 0.2 MG: 1 INJECTION, SOLUTION INTRAMUSCULAR; INTRAVENOUS; SUBCUTANEOUS at 14:45

## 2021-05-25 ASSESSMENT — PAIN DESCRIPTION - PAIN TYPE
TYPE: SURGICAL PAIN
TYPE: CHRONIC PAIN
TYPE: SURGICAL PAIN

## 2021-05-25 ASSESSMENT — FIBROSIS 4 INDEX: FIB4 SCORE: 0.23

## 2021-05-25 ASSESSMENT — PAIN SCALES - GENERAL: PAIN_LEVEL: 3

## 2021-05-25 NOTE — DISCHARGE INSTRUCTIONS
Laparoscopic Cholecystectomy D/C instructions:    DIET: Upon discharge from the hospital you may resume your normal preoperative diet. Depending on how you are feeling and whether you have nausea or not, you may wish to stay with a bland diet for the first few days. However, you can advance this as quickly as you feel ready. INCREASE FLUIDS AND FIBER TO AVOID CONSTIPATION.    ACTIVITIES: After discharge from the hospital, you may resume full routine activities. However, there should be no heavy lifting (greater than 15 pounds) and no strenuous activities until after your follow-up visit. Otherwise, routine activities of daily living are acceptable. Rest and take it easy for the first 24 hours.  A responsible adult is recommended to remain with you during that time.  It is normal to feel sleepy.  We encourage you to not do anything that requires balance, judgment or coordination.    MILD FLU-LIKE SYMPTOMS ARE NORMAL. YOU MAY EXPERIENCE GENERALIZED MUSCLE ACHES, THROAT IRRITATION, HEADACHE AND/OR SOME NAUSEA.    FOR 24 HOURS DO NOT:  Drive, operate machinery or run household appliances.  Drink beer or alcoholic beverages.   Make important decisions or sign legal documents.    DRIVING: You may drive whenever you are off pain medications and are able to perform the activities needed to drive, i.e. turning, bending, twisting, etc.    BATHING: You may get the wound wet at any time after leaving the hospital. You may shower, but do not submerge in a bath for at least a week. Dressings may come off after 48 hours.    BOWEL FUNCTION: A few patients, after this operation, will develop either frequent or loose stools after meals. This usually corrects itself after a few days, to a few weeks. If this occurs, do not worry; it is not unusual and will resolve. Much more common than loose stools, is constipation. The combination of pain medication and decreased activity level can cause constipation in otherwise normal patients. If  you feel this is occurring, take a laxative (Milk of Magnesia, Ex-Lax, Senokot, etc.) until the problem has resolved.    PAIN MEDICATION: Resume taking daily medication. You will be given a prescription for pain medication at discharge. Please take these as directed. It is important to remember not to take medications on an empty stomach as this may cause nausea. PAIN MEDICATION CAN BE VERY CONSTIPATING. Take a stool softener or laxative such as senokot, pericolace, or milk of magnesia if needed.    CALL IF YOU HAVE: (1) Fevers to more than 1010 F, (2) Unusual chest or leg pain, (3) Drainage or fluid from incision that may be foul smelling, increased tenderness or soreness at the wound or the wound edges are no longer together, redness or swelling at the incision site. Please do not hesitate to call with any other questions.     APPOINTMENT: Contact our office at [phone] for a follow-up appointment in 1 to 2 weeks following your procedure.    If you have any additional questions, please do not hesitate to call the office.    Office address:  [address] [place] [address], [address] [place] [address]    You should CALL YOUR PHYSICIAN if you develop:  Fever greater than 101 degrees F.  Pain not relieved by medication, or persistent nausea or vomiting.  Excessive bleeding (blood soaking through dressing) or unexpected drainage from the wound.  Extreme redness or swelling around the incision site, drainage of pus or foul smelling drainage.  Inability to urinate or empty your bladder within 8 hours.  Problems with breathing or chest pain.    You should call [phone] if you develop problems with breathing or chest pain.  If you are unable to contact your doctor or surgical center, you should go to the nearest emergency room or urgent care center.      Dr. Adam's telephone #: [phone]    If any questions arise, call your doctor.  If your doctor is not available, please feel free to call the Surgical Center at [phone]. The Contact  Center is open Monday through Friday 7AM to 5PM and may speak to a nurse at [phone], or toll free at [phone].     A registered nurse may call you a few days after your surgery to see how you are doing after your procedure.    MEDICATIONS:   Prescription given for OXYCODONE / IBUPROFEN / TYLENOL / COLACE.      Last pain medication given at 2:00 PM (OXYCODONE) - NEXT DOSE MAY BE TAKEN AT/AFTER 6:00 PM.   · Received Toradol (IV NSAID similar to Ibuprofen) at 1:30 PM - OK to take ibuprofen at/after 7:30PM  · Received Tylenol 500mg at 12:45 PM - OK to take Tylenol again at/after 6:45PM    Depression / Suicide Risk    As you are discharged from this [facility] facility, it is important to learn how to keep safe from harming yourself.    Recognize the warning signs:  · Abrupt changes in personality, positive or negative- including increase in energy   · Giving away possessions  · Change in eating patterns- significant weight changes-  positive or negative  · Change in sleeping patterns- unable to sleep or sleeping all the time   · Unwillingness or inability to communicate  · Depression  · Unusual sadness, discouragement and loneliness  · Talk of wanting to die  · Neglect of personal appearance   · Rebelliousness- reckless behavior  · Withdrawal from people/activities they love  · Confusion- inability to concentrate     If you or a loved one observes any of these behaviors or has concerns about self-harm, here's what you can do:  · Talk about it- your feelings and reasons for harming yourself  · Remove any means that you might use to hurt yourself (examples: pills, rope, extension cords, firearm)  · Get professional help from the community (Mental Health, Substance Abuse, psychological counseling)  · Do not be alone:Call your Safe Contact- someone whom you trust who will be there for you.  · Call your local CRISIS HOTLINE 604-8121 or 172-357-7780  · Call your local Children's Mobile Crisis Response Team  [facility] (092) 631-5783 or www.[place]  · Call the toll free National Suicide Prevention Hotlines   · National Suicide Prevention Lifeline 474-401-BDHC (9453)  · National Hope Line Network 800-SUICIDE (864-9027)

## 2021-05-25 NOTE — OR NURSING
1340 -- Pt arrived from OR to bay #24. ID verified. Report received. Connected to monitor. 6L O2 via mask. Lap sites x4 with dermabond to mid and upper right abdomen. Ice pack placed.    1355 -- c/o 8/10 abdominal pain. IV fentanyl 50mcg and PO oxycodone 10 mg given. Tolerated sips of water. Weaned to 4L O2 via mask.    1405 -- repeat IV fentanyl 50mcg given for continued 8/10 abdominal pain. Weaned to 2L O2 via NC.    1430 -- IV dilaudid 0.2mg given for 6/10 abdominal pain.    1445 -- repeat IV dilaudid 0.2mg given for unrelieved 6/10 abdominal pain    1449 -- Telephone updates to OLGA Montero    1515 -- report from SAY Whalen. Pt arrived to PACU 2. Stand and transfer to recliner chair. Connected to monitor. PIV saline locked. Chart and belongings at bedside.    1518 -- OLGA Montero at bedside

## 2021-05-25 NOTE — ANESTHESIA POSTPROCEDURE EVALUATION
Patient: Robert Dahl    Procedure Summary     Date: 05/25/21 Room / Location: [facility] ROOM 27 / SURGERY SAME DAY [facility]    Anesthesia Start: 1250 Anesthesia Stop: 1341    Procedure: CHOLECYSTECTOMY, LAPAROSCOPIC. (Abdomen) Diagnosis: (CHOLELITHIASIS)    Surgeons: Ziggy Adam M.D. Responsible Provider: Rob Serrano M.D.    Anesthesia Type: general ASA Status: 2          Final Anesthesia Type: general  Last vitals  BP   Blood Pressure: 107/60    Temp   36.7 °C (98 °F)    Pulse   65   Resp   16    SpO2   93 %      Anesthesia Post Evaluation    Patient location during evaluation: PACU  Patient participation: complete - patient participated  Level of consciousness: awake and alert  Pain score: 3    Airway patency: patent  Anesthetic complications: no  Cardiovascular status: hemodynamically stable  Respiratory status: acceptable  Hydration status: euvolemic    PONV: none          No complications documented.     Nurse Pain Score: 3 (NPRS)

## 2021-05-25 NOTE — ANESTHESIA TIME REPORT
Anesthesia Start and Stop Event Times     Date Time Event    5/25/2021 1237 Ready for Procedure     1250 Anesthesia Start     1341 Anesthesia Stop        Responsible Staff  05/25/21    Name Role Begin End    Min KRISTIAN Serrano M.D. Anesth 1250 1341        Preop Diagnosis (Free Text):  Pre-op Diagnosis     CHOLELITHIASIS        Preop Diagnosis (Codes):    Post op Diagnosis  Cholelithiasis      Premium Reason  Non-Premium    Comments:

## 2021-05-25 NOTE — OP REPORT
Operative Report    Date: 5/25/2021    Surgeon: Ziggy Adam M.D.     Assistant: JUSTIN Ceballos    Pre-operative Diagnosis: Biliary Dyskinesia    Post-operative Diagnosis: same    ASA Classification: II.    Procedure: Laparoscopic cholecystectomy    Indications: This is a 41 y.o. male who presented with symptoms of right upper quadrant pain.      The indications for a surgical assistant in this surgery were indicated due to complexity of the procedure. Their role included aiding in incision, retraction, holding devices including camera for laparoscopic procedure, and closure of the wound.      Wound Classification: Class II, II, Clean Contaminated..    Findings: critical view of safety obtained    Procedure in detail: The patient was seen and examined in the preoperative holding area.  The risks benefits and alternatives of the procedure were discussed with the patient who wished to proceed with the procedure as described.  The patient was transferred to the operating room placed in supine position and all pressure points were properly padded.  General endotracheal anesthesia was induced and preoperative antibiotics were given per SCIP protocol.  Patient's abdomen was prepped with ChloraPrep and draped in the normal sterile fashion.  A timeout was performed confirming correct patient, correct procedure, and that all necessary equipment was in the room.      After infiltration of local anesthetic, an incision was made in the supraumbilical position.  A combination of blunt and electrocautery dissection was used down to the fascia.  The umbilical stalk was grasped elevated and the fascia was sharply incised.  A figure-of-eight 0 vicryl suture was placed across the fascial opening.  The Patricia port was introduced and pneumoperitoneum was achieved and maintained at 15 mmHg carbon dioxide throughout the entirety of the case.   The 5 mm camera was introduced and the abdomen was inspected and no underlying trauma was  identified from abdominal entry. After infusing local anesthetic under direct visualization two 5 mm right upper quadrant and one 5 mm epigastric port were placed.    The gallbladder was identified in the right upper quadrant.  A combination of blunt and electrocautery dissection were used to dissect the overlaying tissue free from around the cystic duct and cystic artery.  Dissection was continued until the cystic duct and cystic artery free and there is nothing but liver parenchyma behind.  This confirmed the critical view of safety. The cystic duct and cystic artery were double clipped on the patient's side single clipped on the specimen side and sharply transected. The gallbladder was removed from the cystic plate using electrocautery.  I inspected the cystic plate and found it to be hemostatic.  The gallbladder was placed in a 10 mm Endo Catch bag through the supraumbilical port and removed.    All ports were removed with video assist, and were hemostatic. The previously placed vicryl suture were tied. All skin sites were closed with subcuticular Monocryl and Dermabond was placed over the wounds.    The patient was awakened from general anesthetic, and was taken to the recovery room in stable condition.    Sponge and needle counts were correct at the end of the case.     Specimen: gallbladder and content for permanent pathology    EBL: 10mL    Dispo: stable, extubated, to PACU    Ziggy Adam M.D.  [facility]  [phone]

## 2021-05-25 NOTE — OR NURSING
1515 Report received from SAY Reese. Pt transferred to recliner with minimal assistance.     1530 Discharge instructions reviewed with family member. All questions answered, verbalizes understanding.     1554 Patient states he feels better and pain is at a tolerable level. Assisted to change into clothing by spouse.     1615 Additional pain med given for increased pain after changing.     1621 IV dc'd, ID bands removed,All personal belongings with pt.     1625 Transported to car via wheelchair, accompanied by CNA

## 2021-05-25 NOTE — ANESTHESIA PREPROCEDURE EVALUATION
42 yo M w/ hx of GERD, DVT in LLE in 2019, here for lap socorro. NPO. METS >4. Not on AC or BB.    Relevant Problems   CARDIAC   (positive) DVT (deep venous thrombosis) (HCC)       Physical Exam    Airway   Mallampati: I  TM distance: >3 FB  Neck ROM: full       Cardiovascular - normal exam  Rhythm: regular  Rate: normal  (-) murmur     Dental - normal exam           Pulmonary - normal exam  Breath sounds clear to auscultation     Abdominal    Neurological - normal exam                 Anesthesia Plan    ASA 2       Plan - general       Airway plan will be ETT          Induction: intravenous    Postoperative Plan: Postoperative administration of opioids is intended.    Pertinent diagnostic labs and testing reviewed    Informed Consent:    Anesthetic plan and risks discussed with patient.    Use of blood products discussed with: patient whom consented to blood products.

## 2021-08-04 ENCOUNTER — HOSPITAL ENCOUNTER (OUTPATIENT)
Dept: LAB | Facility: MEDICAL CENTER | Age: 41
End: 2021-08-04
Attending: STUDENT IN AN ORGANIZED HEALTH CARE EDUCATION/TRAINING PROGRAM
Payer: COMMERCIAL

## 2021-08-04 PROCEDURE — 80307 DRUG TEST PRSMV CHEM ANLYZR: CPT

## 2021-08-06 LAB
AMPHETAMINES UR QL: NEGATIVE NG/ML
BARBITURATES UR QL: NEGATIVE NG/ML
BENZODIAZ UR QL: NEGATIVE NG/ML
CANNABINOIDS UR QL SCN: POSITIVE NG/ML
COCAINE UR QL: NEGATIVE NG/ML
DRUG SCREEN COMMENT UR-IMP: NORMAL
MDMA CTO UR SCN-MCNC: NEGATIVE NG/ML
METHADONE UR QL: NEGATIVE NG/ML
OPIATES UR QL: NEGATIVE NG/ML
OXYCODONE CTO UR SCN-MCNC: NEGATIVE NG/ML
PCP UR QL SCN: NEGATIVE NG/ML
PROPOXYPH UR QL: NEGATIVE NG/ML

## 2021-10-30 ENCOUNTER — APPOINTMENT (OUTPATIENT)
Dept: RADIOLOGY | Facility: MEDICAL CENTER | Age: 41
End: 2021-10-30
Attending: EMERGENCY MEDICINE

## 2021-10-30 ENCOUNTER — HOSPITAL ENCOUNTER (EMERGENCY)
Facility: MEDICAL CENTER | Age: 41
End: 2021-10-30
Attending: EMERGENCY MEDICINE

## 2021-10-30 VITALS
BODY MASS INDEX: 27.38 KG/M2 | RESPIRATION RATE: 18 BRPM | DIASTOLIC BLOOD PRESSURE: 88 MMHG | WEIGHT: 202.16 LBS | OXYGEN SATURATION: 96 % | HEART RATE: 55 BPM | TEMPERATURE: 98 F | SYSTOLIC BLOOD PRESSURE: 141 MMHG | HEIGHT: 72 IN

## 2021-10-30 DIAGNOSIS — M79.604 RIGHT LEG PAIN: ICD-10-CM

## 2021-10-30 DIAGNOSIS — M54.31 SCIATICA OF RIGHT SIDE: ICD-10-CM

## 2021-10-30 LAB
ALBUMIN SERPL BCP-MCNC: 4.5 G/DL (ref 3.2–4.9)
ALBUMIN/GLOB SERPL: 1.8 G/DL
ALP SERPL-CCNC: 84 U/L (ref 30–99)
ALT SERPL-CCNC: 21 U/L (ref 2–50)
ANION GAP SERPL CALC-SCNC: 9 MMOL/L (ref 7–16)
APTT PPP: 29.4 SEC (ref 24.7–36)
AST SERPL-CCNC: 20 U/L (ref 12–45)
BASOPHILS # BLD AUTO: 0.7 % (ref 0–1.8)
BASOPHILS # BLD: 0.05 K/UL (ref 0–0.12)
BILIRUB SERPL-MCNC: 0.2 MG/DL (ref 0.1–1.5)
BUN SERPL-MCNC: 4 MG/DL (ref 8–22)
CALCIUM SERPL-MCNC: 9.2 MG/DL (ref 8.5–10.5)
CHLORIDE SERPL-SCNC: 105 MMOL/L (ref 96–112)
CO2 SERPL-SCNC: 27 MMOL/L (ref 20–33)
CREAT SERPL-MCNC: 1 MG/DL (ref 0.5–1.4)
EOSINOPHIL # BLD AUTO: 0.13 K/UL (ref 0–0.51)
EOSINOPHIL NFR BLD: 1.9 % (ref 0–6.9)
ERYTHROCYTE [DISTWIDTH] IN BLOOD BY AUTOMATED COUNT: 39.2 FL (ref 35.9–50)
GLOBULIN SER CALC-MCNC: 2.5 G/DL (ref 1.9–3.5)
GLUCOSE SERPL-MCNC: 116 MG/DL (ref 65–99)
HCT VFR BLD AUTO: 45.5 % (ref 42–52)
HGB BLD-MCNC: 15.6 G/DL (ref 14–18)
IMM GRANULOCYTES # BLD AUTO: 0.02 K/UL (ref 0–0.11)
IMM GRANULOCYTES NFR BLD AUTO: 0.3 % (ref 0–0.9)
INR PPP: 0.99 (ref 0.87–1.13)
LYMPHOCYTES # BLD AUTO: 1.63 K/UL (ref 1–4.8)
LYMPHOCYTES NFR BLD: 23.8 % (ref 22–41)
MCH RBC QN AUTO: 31 PG (ref 27–33)
MCHC RBC AUTO-ENTMCNC: 34.3 G/DL (ref 33.7–35.3)
MCV RBC AUTO: 90.3 FL (ref 81.4–97.8)
MONOCYTES # BLD AUTO: 0.59 K/UL (ref 0–0.85)
MONOCYTES NFR BLD AUTO: 8.6 % (ref 0–13.4)
NEUTROPHILS # BLD AUTO: 4.44 K/UL (ref 1.82–7.42)
NEUTROPHILS NFR BLD: 64.7 % (ref 44–72)
NRBC # BLD AUTO: 0 K/UL
NRBC BLD-RTO: 0 /100 WBC
PLATELET # BLD AUTO: 299 K/UL (ref 164–446)
PMV BLD AUTO: 9.2 FL (ref 9–12.9)
POTASSIUM SERPL-SCNC: 4.1 MMOL/L (ref 3.6–5.5)
PROT SERPL-MCNC: 7 G/DL (ref 6–8.2)
PROTHROMBIN TIME: 12.8 SEC (ref 12–14.6)
RBC # BLD AUTO: 5.04 M/UL (ref 4.7–6.1)
SODIUM SERPL-SCNC: 141 MMOL/L (ref 135–145)
WBC # BLD AUTO: 6.9 K/UL (ref 4.8–10.8)

## 2021-10-30 PROCEDURE — 99283 EMERGENCY DEPT VISIT LOW MDM: CPT

## 2021-10-30 PROCEDURE — 36415 COLL VENOUS BLD VENIPUNCTURE: CPT

## 2021-10-30 PROCEDURE — 93971 EXTREMITY STUDY: CPT | Mod: RT

## 2021-10-30 PROCEDURE — 85610 PROTHROMBIN TIME: CPT

## 2021-10-30 PROCEDURE — 85730 THROMBOPLASTIN TIME PARTIAL: CPT

## 2021-10-30 PROCEDURE — 93971 EXTREMITY STUDY: CPT | Mod: 26,RT | Performed by: INTERNAL MEDICINE

## 2021-10-30 PROCEDURE — 85025 COMPLETE CBC W/AUTO DIFF WBC: CPT

## 2021-10-30 PROCEDURE — 80053 COMPREHEN METABOLIC PANEL: CPT

## 2021-10-30 RX ORDER — METHYLPREDNISOLONE 4 MG/1
TABLET ORAL
Qty: 1 EACH | Refills: 0 | Status: SHIPPED | OUTPATIENT
Start: 2021-10-30 | End: 2022-04-05

## 2021-10-30 ASSESSMENT — ENCOUNTER SYMPTOMS
CHILLS: 0
BACK PAIN: 1
FEVER: 0
SHORTNESS OF BREATH: 0

## 2021-10-30 ASSESSMENT — FIBROSIS 4 INDEX: FIB4 SCORE: 0.23

## 2021-10-30 NOTE — DISCHARGE INSTRUCTIONS
Rest, follow-up with your doctor.  Return to the emergency department for any more pain, numbness, weakness or other concerns.  Take medications as prescribed.  If your leg pain worsens or you have worsening swelling you may need a repeat ultrasound.  This should be done in 7 to 10 days in light of worsening symptoms.

## 2021-10-30 NOTE — ED NOTES
Pt brought back to GR 24 from triage. Pt able to transfer self to bed, family at bedside, call light in reach. Chart up for ERP.

## 2021-10-30 NOTE — ED NOTES
Discharge orders received, IV and monitor discontinued, instructions and education given, follow-up discussed, prescription sent to pharmacy, pt verbalized understanding.

## 2021-10-30 NOTE — ED PROVIDER NOTES
"ED Provider Note    Scribed for Rob Lin M.D. by Thuy Gayle. 10/30/2021, 7:31 AM.    Primary care provider: EMILY Martell  Means of arrival: Walk in  History obtained from: Patient, wife   History limited by: None    CHIEF COMPLAINT  Chief Complaint   Patient presents with   • Leg Pain     x 1.5 months.Patient reports a throbbing/ cramping pain to his right upper leg. Patient states he had a DVT to the same area 1.5 years ago and is having the same symptoms. Patient also reports cramping to the right calf and throbbing to the right lower back. Patient was on a 4 day drive back from [place] last week and states the pain became worse after the drive. Patient denies chest pain or SOB.        HPI  Robert Dahl is a 41 y.o. male, with a history of DVT, and hyperlipidemia, who presents to the Emergency Department accompanied by wife, for right calf and upper leg pain. Onset was 1.5 months ago. He describes his pain feels similar in quality to his DVT history 2 years ago. He also describes his pain as throbbing, cramping, and burning in quality. Wife notes the patient and herself recently returned from a 36 hour road trip back from [place] last week. His pain worsened after the drive. He denies any proceeding trauma, or injuries. Exacerbating factors include walking. He reports associated low back \"tingling\" pain worse at night, anxiety, and chest pain secondary to his anxiety (resolved). He denies any associated shortness of breath, fever, or chills. He admits to smoking marijuana, but denies smoking cigarettes, alcohol use, or illicit drug use.     REVIEW OF SYSTEMS  Review of Systems   Constitutional: Negative for chills and fever.   Respiratory: Negative for shortness of breath.    Cardiovascular: Positive for chest pain (secondary to anxiety, resolved).   Musculoskeletal: Positive for back pain.   Psychiatric/Behavioral:        Anxiety   All other systems reviewed and are " negative.    PAST MEDICAL HISTORY   has a past medical history of DVT (deep venous thrombosis) (HCC) (), Heart burn, High cholesterol, Hyperlipidemia, Indigestion, Jaundice (2021), Psychiatric problem, and Renal disorder.    SURGICAL HISTORY   has a past surgical history that includes other and socorro by laparoscopy (2021).    SOCIAL HISTORY  Social History     Tobacco Use   • Smoking status: Former Smoker     Packs/day: 0.50     Years: 5.00     Pack years: 2.50     Quit date: 2014     Years since quittin.9   • Smokeless tobacco: Never Used   • Tobacco comment: quit    Vaping Use   • Vaping Use: Every day   • Substances: THC, q hs   • Devices: Disposable   Substance Use Topics   • Alcohol use: Yes     Alcohol/week: 0.6 oz     Types: 1 Glasses of wine per week     Comment: Occ   • Drug use: Yes     Frequency: 7.0 times per week     Types: Marijuana     Comment: CBD, inhaler      Social History     Substance and Sexual Activity   Drug Use Yes   • Frequency: 7.0 times per week   • Types: Marijuana    Comment: CBD, inhaler       FAMILY HISTORY  Family History   Problem Relation Age of Onset   • Heart Disease Mother    • Hypertension Mother    • Heart Disease Father    • Diabetes Father        CURRENT MEDICATIONS  Home Medications     Reviewed by Megan Downey R.N. (Registered Nurse) on 10/30/21 at 0549  Med List Status: Not Addressed   Medication Last Dose Status   acetaminophen (TYLENOL) 325 MG Tab  Active   busPIRone (BUSPAR) 10 MG Tab tablet  Active   dicyclomine (BENTYL) 10 MG Cap  Active   dicyclomine (BENTYL) 20 MG Tab  Active   docusate sodium (COLACE) 100 MG Cap  Active   FLUoxetine (PROZAC) 20 MG Cap  Active   fluoxetine (PROZAC) 40 MG capsule  Active   ibuprofen (MOTRIN) 600 MG Tab  Active   omeprazole (PRILOSEC) 20 MG delayed-release capsule  Active   ondansetron (ZOFRAN ODT) 4 MG TABLET DISPERSIBLE  Active   ondansetron (ZOFRAN) 4 MG Tab tablet  Active   rosuvastatin (CRESTOR)  10 MG Tab  Active   sucralfate (CARAFATE) 1 GM Tab  Active   traZODone (DESYREL) 50 MG Tab  Active   vitamin D, Ergocalciferol, (DRISDOL) 1.25 MG (31353 UT) Cap capsule  Active                ALLERGIES  No Known Allergies    PHYSICAL EXAM  VITAL SIGNS: /91   Pulse 60   Temp 36.1 °C (96.9 °F) (Temporal)   Resp 18   Ht 1.829 m (6')   Wt 91.7 kg (202 lb 2.6 oz)   SpO2 97%   BMI 27.42 kg/m²   Vitals reviewed.  Constitutional: Well developed, Well nourished, No acute distress, Non-toxic appearance.   HENT: Normocephalic, Atraumatic, Bilateral external ears normal, Oropharynx moist, No oral exudates, Nose normal.   Eyes: PERRL, EOMI, Conjunctiva normal, No discharge.   Neck: Normal range of motion, No tenderness, Supple, No stridor.   Cardiovascular: Normal heart rate, Normal rhythm, No murmurs, No rubs, No gallops.   Thorax & Lungs: Normal breath sounds, No respiratory distress, No wheezing, No chest tenderness.   Abdomen: Bowel sounds normal, Soft, No tenderness  Skin: Warm, Dry, No erythema, No rash.   Back: No tenderness, No CVA tenderness.   Musculoskeletal: Good range of motion in all major joints. No edema. No tenderness to palpation or major deformities noted. Good pulses in all extremities.   Neurologic: Alert, Normal motor function, Normal sensory function, No focal deficits noted.  Symmetric reflexes and patella.  Normal strength and sensation.  Psychiatric: Affect normal    LABS  Results for orders placed or performed during the hospital encounter of 10/30/21   CBC WITH DIFFERENTIAL   Result Value Ref Range    WBC 6.9 4.8 - 10.8 K/uL    RBC 5.04 4.70 - 6.10 M/uL    Hemoglobin 15.6 14.0 - 18.0 g/dL    Hematocrit 45.5 42.0 - 52.0 %    MCV 90.3 81.4 - 97.8 fL    MCH 31.0 27.0 - 33.0 pg    MCHC 34.3 33.7 - 35.3 g/dL    RDW 39.2 35.9 - 50.0 fL    Platelet Count 299 164 - 446 K/uL    MPV 9.2 9.0 - 12.9 fL    Neutrophils-Polys 64.70 44.00 - 72.00 %    Lymphocytes 23.80 22.00 - 41.00 %    Monocytes 8.60  0.00 - 13.40 %    Eosinophils 1.90 0.00 - 6.90 %    Basophils 0.70 0.00 - 1.80 %    Immature Granulocytes 0.30 0.00 - 0.90 %    Nucleated RBC 0.00 /100 WBC    Neutrophils (Absolute) 4.44 1.82 - 7.42 K/uL    Lymphs (Absolute) 1.63 1.00 - 4.80 K/uL    Monos (Absolute) 0.59 0.00 - 0.85 K/uL    Eos (Absolute) 0.13 0.00 - 0.51 K/uL    Baso (Absolute) 0.05 0.00 - 0.12 K/uL    Immature Granulocytes (abs) 0.02 0.00 - 0.11 K/uL    NRBC (Absolute) 0.00 K/uL   COMP METABOLIC PANEL   Result Value Ref Range    Sodium 141 135 - 145 mmol/L    Potassium 4.1 3.6 - 5.5 mmol/L    Chloride 105 96 - 112 mmol/L    Co2 27 20 - 33 mmol/L    Anion Gap 9.0 7.0 - 16.0    Glucose 116 (H) 65 - 99 mg/dL    Bun 4 (L) 8 - 22 mg/dL    Creatinine 1.00 0.50 - 1.40 mg/dL    Calcium 9.2 8.5 - 10.5 mg/dL    AST(SGOT) 20 12 - 45 U/L    ALT(SGPT) 21 2 - 50 U/L    Alkaline Phosphatase 84 30 - 99 U/L    Total Bilirubin 0.2 0.1 - 1.5 mg/dL    Albumin 4.5 3.2 - 4.9 g/dL    Total Protein 7.0 6.0 - 8.2 g/dL    Globulin 2.5 1.9 - 3.5 g/dL    A-G Ratio 1.8 g/dL   APTT   Result Value Ref Range    APTT 29.4 24.7 - 36.0 sec   PROTHROMBIN TIME (INR)   Result Value Ref Range    PT 12.8 12.0 - 14.6 sec    INR 0.99 0.87 - 1.13   ESTIMATED GFR   Result Value Ref Range    GFR If African American >60 >60 mL/min/1.73 m 2    GFR If Non African American >60 >60 mL/min/1.73 m 2     All labs reviewed by me.    RADIOLOGY  US-EXTREMITY VENOUS LOWER UNILAT RIGHT         All visualized veins demonstrate complete color filling and compressibility    with normal venous flow dynamics including spontaneous flow and respiratory    phasicity.    Flow was evaluated in the contralateral common femoral vein and normal    venous flow dynamics including spontaneous flow and respiratory phasic    variation were demonstrated.    No superficial or deep venous thrombosis.    The radiologist's interpretation of all radiological studies have been reviewed by me.    COURSE & MEDICAL DECISION  MAKING  Pertinent Labs & Imaging studies reviewed. (See chart for details)    7:31 AM - Patient seen and examined at bedside. The patient presents with right calf pain and low back pain, and the differential diagnosis includes but is not limited to DVT, sciatica, musculoskeletal pain, calf strain. Discussed plan of care with the patient and his wife. I informed them I will obtain lab and imaging studies including an ultrasound of his right leg to evaluate his symptoms and rule out DVT. They are understanding and agreeable to plan. Ordered for INR, APTT, CMP, CBC with diff, and US Extremity Lower right to evaluate.     8:38 AM - Patient was reevaluated at bedside.  Ultrasound is negative labs are reassuring.  She has back pain that radiates down his right leg with intermittent numbness and burning I think this is likely sciatica will treat empirically with steroids.  The patient does have some anxiety and made him aware that anxiety could be worsened by the symptoms.  Patient return for worsening symptoms or other concerns.        Advised follow-up with his doctor.  He denies any IV drug abuse.  No red flags for spinal malignancy, abscess, or fracture.  Do not think imaging is indicated at this time.  Questions are answered is agreeable to plan.    He is resting comfortably in bed without any new symptoms. Discussed lab and radiology results with the patient and informed them there was no evidence for DVT. He will follow up with his PCP. He will also be prescribed a Medrol Dosepak for his symptoms. The patient will return for new or worsening symptoms and is stable at the time of discharge. Patient verbalizes understanding and agreement to this plan of care.      The patient is referred to a primary physician for blood pressure management, diabetic screening, and for all other preventative health concerns.    DISPOSITION:  Patient will be discharged home in stable condition.    FOLLOW UP:  Marielle Kemp  A.P.R.N.  [address] [address]  [phone]            OUTPATIENT MEDICATIONS:  New Prescriptions    METHYLPREDNISOLONE (MEDROL DOSEPAK) 4 MG TABLET THERAPY PACK    Use as directed       FINAL IMPRESSION  1. Right leg pain    2. Sciatica of right side          IThuy (Scribe), am scribing for, and in the presence of, Rob Lin M.D..    Electronically signed by: Thuy Gayle (Scribe), 10/30/2021    IRob M.D. personally performed the services described in this documentation, as scribed by Thuy Gayle in my presence, and it is both accurate and complete.    C    The note accurately reflects work and decisions made by me.  Rob Lin M.D.  10/30/2021  1:30 PM

## 2021-10-30 NOTE — ED TRIAGE NOTES
Robert Dahl  41 y.o. M  Chief Complaint   Patient presents with   • Leg Pain     x 1.5 months.Patient reports a throbbing/ cramping pain to his right upper leg. Patient states he had a DVT to the same area 1.5 years ago and is having the same symptoms. Patient also reports cramping to the right calf and throbbing to the right lower back. Patient was on a 4 day drive back from [place] last week and states the pain became worse after the drive. Patient denies chest pain or SOB.      Vitals:    10/30/21 0517   BP: 148/91   Pulse: 60   Resp: 18   Temp: 36.1 °C (96.9 °F)   SpO2: 97%     Triage process explained to patient, apologized for wait time, and returned to [facility].  Pt informed to notify staff of any change in condition.

## 2022-02-03 ENCOUNTER — HOSPITAL ENCOUNTER (OUTPATIENT)
Facility: MEDICAL CENTER | Age: 42
End: 2022-02-03
Attending: NURSE PRACTITIONER
Payer: COMMERCIAL

## 2022-02-03 ENCOUNTER — OFFICE VISIT (OUTPATIENT)
Dept: MEDICAL GROUP | Facility: PHYSICIAN GROUP | Age: 42
End: 2022-02-03
Payer: COMMERCIAL

## 2022-02-03 VITALS
DIASTOLIC BLOOD PRESSURE: 88 MMHG | WEIGHT: 214.8 LBS | BODY MASS INDEX: 29.09 KG/M2 | OXYGEN SATURATION: 98 % | HEIGHT: 72 IN | TEMPERATURE: 97.6 F | HEART RATE: 79 BPM | RESPIRATION RATE: 12 BRPM | SYSTOLIC BLOOD PRESSURE: 134 MMHG

## 2022-02-03 DIAGNOSIS — M35.3 POLYMYALGIA (HCC): ICD-10-CM

## 2022-02-03 DIAGNOSIS — R09.81 SINUS CONGESTION: ICD-10-CM

## 2022-02-03 DIAGNOSIS — E55.9 VITAMIN D DEFICIENCY: ICD-10-CM

## 2022-02-03 DIAGNOSIS — R06.02 SOB (SHORTNESS OF BREATH): ICD-10-CM

## 2022-02-03 DIAGNOSIS — E78.1 HIGH TRIGLYCERIDES: ICD-10-CM

## 2022-02-03 DIAGNOSIS — R68.89 FLU-LIKE SYMPTOMS: ICD-10-CM

## 2022-02-03 DIAGNOSIS — R09.89 CHEST CONGESTION: ICD-10-CM

## 2022-02-03 DIAGNOSIS — R05.9 COUGH: ICD-10-CM

## 2022-02-03 PROBLEM — K29.80 DUODENITIS: Status: ACTIVE | Noted: 2022-02-03

## 2022-02-03 PROBLEM — K92.0 HEMATEMESIS: Status: ACTIVE | Noted: 2022-02-03

## 2022-02-03 PROBLEM — N13.9 URINARY OUTFLOW OBSTRUCTION: Status: ACTIVE | Noted: 2022-02-03

## 2022-02-03 PROBLEM — J40 BRONCHITIS: Status: ACTIVE | Noted: 2022-02-03

## 2022-02-03 PROBLEM — K27.9 PEPTIC ULCER: Status: ACTIVE | Noted: 2022-02-03

## 2022-02-03 PROBLEM — L02.214 ABSCESS OF GROIN: Status: ACTIVE | Noted: 2022-02-03

## 2022-02-03 PROBLEM — R63.5 WEIGHT GAIN: Status: ACTIVE | Noted: 2022-02-03

## 2022-02-03 PROBLEM — J06.9 UPPER RESPIRATORY INFECTION: Status: ACTIVE | Noted: 2022-02-03

## 2022-02-03 PROBLEM — F41.8 MIXED ANXIETY AND DEPRESSIVE DISORDER: Status: ACTIVE | Noted: 2022-02-03

## 2022-02-03 PROBLEM — S33.5XXA LUMBAR SPRAIN: Status: ACTIVE | Noted: 2022-02-03

## 2022-02-03 PROCEDURE — 99214 OFFICE O/P EST MOD 30 MIN: CPT | Performed by: NURSE PRACTITIONER

## 2022-02-03 PROCEDURE — 0240U HCHG SARS-COV-2 COVID-19 NFCT DS RESP RNA 3 TRGT MIC: CPT

## 2022-02-03 RX ORDER — ROSUVASTATIN CALCIUM 10 MG/1
10 TABLET, COATED ORAL
Qty: 90 TABLET | Refills: 3 | Status: SHIPPED | OUTPATIENT
Start: 2022-02-03 | End: 2022-04-14

## 2022-02-03 RX ORDER — FLUTICASONE PROPIONATE 220 UG/1
1 AEROSOL, METERED RESPIRATORY (INHALATION) 2 TIMES DAILY
Qty: 12 G | Refills: 2 | Status: SHIPPED | OUTPATIENT
Start: 2022-02-03 | End: 2022-04-05

## 2022-02-03 RX ORDER — CODEINE PHOSPHATE AND GUAIFENESIN 10; 100 MG/5ML; MG/5ML
5 SOLUTION ORAL EVERY 6 HOURS PRN
Qty: 120 ML | Refills: 1 | Status: SHIPPED
Start: 2022-02-03 | End: 2022-02-03 | Stop reason: SDUPTHER

## 2022-02-03 RX ORDER — GABAPENTIN 300 MG/1
CAPSULE ORAL
COMMUNITY
Start: 2021-12-08

## 2022-02-03 RX ORDER — CITALOPRAM 40 MG/1
TABLET ORAL
COMMUNITY
End: 2022-02-03

## 2022-02-03 RX ORDER — TRIAMCINOLONE ACETONIDE 55 UG/1
2 SPRAY, METERED NASAL DAILY
Qty: 16.9 G | Refills: 2 | Status: SHIPPED | OUTPATIENT
Start: 2022-02-03 | End: 2022-04-14

## 2022-02-03 RX ORDER — ALPRAZOLAM 0.5 MG/1
TABLET ORAL
COMMUNITY
End: 2022-02-03

## 2022-02-03 RX ORDER — SULFAMETHOXAZOLE AND TRIMETHOPRIM 800; 160 MG/1; MG/1
TABLET ORAL
COMMUNITY
End: 2022-04-05

## 2022-02-03 RX ORDER — SERTRALINE HYDROCHLORIDE 100 MG/1
100 TABLET, FILM COATED ORAL DAILY
COMMUNITY
Start: 2022-01-05 | End: 2022-02-03

## 2022-02-03 RX ORDER — ERGOCALCIFEROL 1.25 MG/1
50000 CAPSULE ORAL
Qty: 12 CAPSULE | Refills: 4 | Status: SHIPPED | OUTPATIENT
Start: 2022-02-03

## 2022-02-03 RX ORDER — GUAIFENESIN 600 MG/1
600 TABLET, EXTENDED RELEASE ORAL EVERY 12 HOURS
Qty: 60 TABLET | Refills: 1 | Status: SHIPPED | OUTPATIENT
Start: 2022-02-03 | End: 2022-02-13

## 2022-02-03 RX ORDER — DEXAMETHASONE SODIUM PHOSPHATE 4 MG/ML
INJECTION, SOLUTION INTRA-ARTICULAR; INTRALESIONAL; INTRAMUSCULAR; INTRAVENOUS; SOFT TISSUE
COMMUNITY
End: 2022-04-05

## 2022-02-03 RX ORDER — AMOXICILLIN 500 MG/1
CAPSULE ORAL
COMMUNITY
Start: 2021-11-19 | End: 2022-02-03

## 2022-02-03 RX ORDER — ALPRAZOLAM 1 MG/1
TABLET ORAL
COMMUNITY
End: 2022-02-03

## 2022-02-03 RX ORDER — PREDNISONE 20 MG/1
TABLET ORAL
COMMUNITY
End: 2022-02-03

## 2022-02-03 RX ORDER — FLUTICASONE PROPIONATE 50 MCG
SPRAY, SUSPENSION (ML) NASAL
COMMUNITY

## 2022-02-03 RX ORDER — PAROXETINE HYDROCHLORIDE 20 MG/1
TABLET, FILM COATED ORAL
COMMUNITY
End: 2022-02-03

## 2022-02-03 RX ORDER — DEXTROMETHORPHAN HYDROBROMIDE AND PROMETHAZINE HYDROCHLORIDE 15; 6.25 MG/5ML; MG/5ML
10 SYRUP ORAL
COMMUNITY
End: 2022-02-03

## 2022-02-03 RX ORDER — CLINDAMYCIN HYDROCHLORIDE 300 MG/1
CAPSULE ORAL
COMMUNITY
End: 2022-02-03

## 2022-02-03 RX ORDER — BENZONATATE 100 MG/1
100 CAPSULE ORAL 3 TIMES DAILY PRN
Qty: 60 CAPSULE | Refills: 0 | Status: SHIPPED | OUTPATIENT
Start: 2022-02-03 | End: 2022-04-05

## 2022-02-03 RX ORDER — MELOXICAM 7.5 MG/1
7.5 TABLET ORAL DAILY
Qty: 30 TABLET | Refills: 1 | Status: SHIPPED | OUTPATIENT
Start: 2022-02-03 | End: 2022-04-14 | Stop reason: SDUPTHER

## 2022-02-03 RX ORDER — NAPROXEN 500 MG/1
TABLET ORAL
COMMUNITY
Start: 2021-11-11 | End: 2022-02-03

## 2022-02-03 RX ORDER — PROPRANOLOL HYDROCHLORIDE 20 MG/1
20 TABLET ORAL 2 TIMES DAILY PRN
COMMUNITY
Start: 2021-11-17 | End: 2022-02-03

## 2022-02-03 RX ORDER — CIPROFLOXACIN 500 MG/1
TABLET, FILM COATED ORAL
COMMUNITY
End: 2022-02-03

## 2022-02-03 RX ORDER — CYCLOBENZAPRINE HCL 5 MG
TABLET ORAL
COMMUNITY
Start: 2021-11-11 | End: 2022-04-05

## 2022-02-03 RX ORDER — QUETIAPINE FUMARATE 50 MG/1
TABLET, FILM COATED ORAL
COMMUNITY
End: 2022-02-03

## 2022-02-03 RX ORDER — CITALOPRAM 20 MG/1
TABLET ORAL
COMMUNITY
End: 2022-04-05

## 2022-02-03 RX ORDER — AMOXICILLIN 875 MG/1
TABLET, COATED ORAL
COMMUNITY
End: 2022-02-03

## 2022-02-03 RX ORDER — ALBUTEROL SULFATE 90 UG/1
2 AEROSOL, METERED RESPIRATORY (INHALATION) EVERY 6 HOURS PRN
Qty: 8.5 G | Refills: 1 | Status: SHIPPED | OUTPATIENT
Start: 2022-02-03 | End: 2022-04-14

## 2022-02-03 RX ORDER — CODEINE PHOSPHATE AND GUAIFENESIN 10; 100 MG/5ML; MG/5ML
5 SOLUTION ORAL EVERY 6 HOURS PRN
Qty: 120 ML | Refills: 1 | Status: SHIPPED | OUTPATIENT
Start: 2022-02-03 | End: 2022-02-08

## 2022-02-03 RX ORDER — HYDROCODONE BITARTRATE AND ACETAMINOPHEN 5; 325 MG/1; MG/1
TABLET ORAL
COMMUNITY
End: 2022-02-03

## 2022-02-03 RX ORDER — HYDROCODONE BITARTRATE AND ACETAMINOPHEN 7.5; 325 MG/1; MG/1
TABLET ORAL
COMMUNITY
End: 2022-02-03

## 2022-02-03 RX ORDER — ESCITALOPRAM OXALATE 20 MG/1
TABLET ORAL
COMMUNITY
End: 2022-02-03

## 2022-02-03 RX ORDER — ZOLPIDEM TARTRATE 10 MG/1
TABLET ORAL
COMMUNITY
End: 2022-02-03

## 2022-02-03 RX ORDER — TAMSULOSIN HYDROCHLORIDE 0.4 MG/1
CAPSULE ORAL
COMMUNITY
End: 2022-02-03

## 2022-02-03 RX ORDER — AZITHROMYCIN 250 MG/1
TABLET, FILM COATED ORAL
COMMUNITY
End: 2022-04-05

## 2022-02-03 RX ORDER — PHENTERMINE HYDROCHLORIDE 37.5 MG/1
TABLET ORAL
COMMUNITY
End: 2022-04-05

## 2022-02-03 RX ORDER — QUETIAPINE FUMARATE 100 MG/1
TABLET, FILM COATED ORAL
COMMUNITY
End: 2022-02-03

## 2022-02-03 RX ORDER — TIZANIDINE 4 MG/1
TABLET ORAL
COMMUNITY
End: 2022-02-03

## 2022-02-03 ASSESSMENT — PATIENT HEALTH QUESTIONNAIRE - PHQ9
CLINICAL INTERPRETATION OF PHQ2 SCORE: 6
5. POOR APPETITE OR OVEREATING: 1 - SEVERAL DAYS
SUM OF ALL RESPONSES TO PHQ QUESTIONS 1-9: 19

## 2022-02-03 ASSESSMENT — FIBROSIS 4 INDEX: FIB4 SCORE: 0.58

## 2022-02-04 DIAGNOSIS — R09.89 CHEST CONGESTION: ICD-10-CM

## 2022-02-04 DIAGNOSIS — R05.9 COUGH: ICD-10-CM

## 2022-02-04 DIAGNOSIS — R09.81 SINUS CONGESTION: ICD-10-CM

## 2022-02-04 NOTE — ASSESSMENT & PLAN NOTE
New problem.  Patient reports a cluster of symptoms, persistent dry cough, chest congestion and sinus congestion with runny nose.  Associated symptom is SOB and occasional chills.  He denies fever, no CP, no body aches, no diarrhea.  Wife with similar symptoms, getting better, has not been tested for Covid.  Vitals are within normal limits today, afebrile with a pulse ox of 98 %.

## 2022-02-04 NOTE — PROGRESS NOTES
Chief Complaint   Patient presents with   • Follow-Up     congestion, sore throat cough,       HISTORY OF PRESENT ILLNESS: Patient is a 42 y.o. male, established patient who presents today to discuss medical problems as listed below:    Health Maintenance:  COMPLETED     Flu-like symptoms  New problem.  Patient reports a cluster of symptoms, persistent dry cough, chest congestion and sinus congestion with runny nose.  Associated symptom is SOB and occasional chills.  He denies fever, no CP, no body aches, no diarrhea.  Wife with similar symptoms, getting better, has not been tested for Covid.  Vitals are within normal limits today, afebrile with a pulse ox of 98 %.    High triglycerides  Cholesterol panel from May 2021 WNL except for slightly decreased HDL at 38.  Patient is working on his diet.  On Crestor 10 mg, tolerating well, needing refills today.      Patient Active Problem List    Diagnosis Date Noted   • Abscess of groin 02/03/2022   • Bronchitis 02/03/2022   • Duodenitis 02/03/2022   • Hematemesis 02/03/2022   • Lumbar sprain 02/03/2022   • Nasal congestion 02/03/2022   • Peptic ulcer 02/03/2022   • Upper respiratory infection 02/03/2022   • Urinary outflow obstruction 02/03/2022   • Weight gain 02/03/2022   • Mixed anxiety and depressive disorder 02/03/2022   • Flu-like symptoms 02/03/2022   • Postoperative pain 05/25/2021   • Hospital discharge follow-up 05/06/2021   • Pain in left leg 05/06/2021   • Abdominal wall pain in right flank 01/05/2021   • Elevated fasting blood sugar 01/05/2021   • High triglycerides 12/20/2019   • Vitamin D deficiency 12/20/2019   • DVT (deep venous thrombosis) ([facility]) 12/06/2019   • Insomnia 12/06/2019   • Anxiety and depression 12/06/2019   • Annual physical exam 12/06/2019   • Multiple lung nodules on CT 12/06/2019        Allergies: Patient has no known allergies.    Current Outpatient Medications   Medication Sig Dispense Refill   • cyclobenzaprine (FLEXERIL) 5 mg tablet       • fluticasone (FLONASE) 50 MCG/ACT nasal spray fluticasone propionate 50 mcg/actuation nasal spray,suspension   Inhale 2 sprays every day by intranasal route.     • gabapentin (NEURONTIN) 300 MG Cap TAKE 1 CAPSULE BY MOUTH THREE TIMES DAILY FOLLOW TITRATION INSTRUCTIONS AS GIVEN     • albuterol 108 (90 Base) MCG/ACT Aero Soln inhalation aerosol Inhale 2 Puffs every 6 hours as needed for Shortness of Breath. 8.5 g 1   • benzonatate (TESSALON) 100 MG Cap Take 1 Capsule by mouth 3 times a day as needed for Cough. 60 Capsule 0   • triamcinolone (NASACORT) 55 MCG/ACT nasal inhaler Administer 2 Sprays into affected nostril(S) every day. 16.9 g 2   • guaiFENesin ER (MUCINEX) 600 MG TABLET SR 12 HR Take 1 Tablet by mouth every 12 hours for 10 days. 60 Tablet 1   • fluticasone (FLOVENT HFA) 220 MCG/ACT Aerosol Inhale 1 Puff 2 times a day. 12 g 2   • guaifenesin-codeine (ROBITUSSIN AC) Solution oral solution Take 5 mL by mouth every 6 hours as needed for Cough for up to 5 days. 120 mL 1   • meloxicam (MOBIC) 7.5 MG Tab Take 1 Tablet by mouth every day. 30 Tablet 1   • rosuvastatin (CRESTOR) 10 MG Tab Take 1 Tablet by mouth every day. 90 Tablet 3   • vitamin D2, Ergocalciferol, (DRISDOL) 1.25 MG (47288 UT) Cap capsule Take 1 Capsule by mouth every 7 days. 12 Capsule 4   • azithromycin (ZITHROMAX) 250 MG Tab azithromycin 250 mg tablet (Patient not taking: Reported on 2/3/2022)     • citalopram (CELEXA) 20 MG Tab citalopram 20 mg tablet   Take 1 tablet every day by oral route for 30 days. (Patient not taking: Reported on 2/3/2022)     • dexamethasone (DECADRON) 4 MG/ML Solution dexamethasone sodium phosphate 4 mg/mL injection solution   Take by injection route.     • phentermine (ADIPEX-P) 37.5 MG tablet phentermine 37.5 mg tablet   Take 1 tablet every day by oral route in the morning for 30 days. (Patient not taking: Reported on 2/3/2022)     • sulfamethoxazole-trimethoprim (BACTRIM DS) 800-160 MG tablet Bactrim  mg-160  mg tablet   Take 1 tablet every 12 hours by oral route for 7 days. (Patient not taking: Reported on 2/3/2022)     • methylPREDNISolone (MEDROL DOSEPAK) 4 MG Tablet Therapy Pack Use as directed 1 Each 0   • ondansetron (ZOFRAN ODT) 4 MG TABLET DISPERSIBLE Take 1 tablet by mouth every 8 hours as needed. (Patient taking differently: Take 4 mg by mouth as needed.) 10 tablet 0     No current facility-administered medications for this visit.       Social History     Tobacco Use   • Smoking status: Former Smoker     Packs/day: 0.50     Years: 5.00     Pack years: 2.50     Quit date: 2014     Years since quittin.1   • Smokeless tobacco: Never Used   • Tobacco comment: quit    Vaping Use   • Vaping Use: Every day   • Substances: THC, q hs   • Devices: Disposable   Substance Use Topics   • Alcohol use: Yes     Alcohol/week: 0.6 oz     Types: 1 Glasses of wine per week     Comment: Occ   • Drug use: Yes     Frequency: 7.0 times per week     Types: Marijuana     Comment: CBD, inhaler     Social History     Social History Narrative   • Not on file       Family History   Problem Relation Age of Onset   • Heart Disease Mother    • Hypertension Mother    • Heart Disease Father    • Diabetes Father        Allergies, past medical history, past surgical history, family history, social history reviewed and updated.    Review of Systems:     - Constitutional: Negative for fever, chills, unexpected weight change, and fatigue/generalized weakness.     - Respiratory: Cough chest congestion and sinus congestion.    - Cardiovascular: Negative for chest pain, palpitations, orthopnea, and bilateral lower extremity edema.     - Gastrointestinal: Negative for heartburn, nausea, vomiting, abdominal pain, hematochezia, melena, diarrhea, constipation, and greasy/foul-smelling stools.     - Psychiatric/Behavioral: Negative for depression, suicidal/homicidal ideation and memory loss.      All other systems reviewed and are  negative    Exam:    /88   Pulse 79   Temp 36.4 °C (97.6 °F) (Temporal)   Resp 12   Ht 1.829 m (6')   Wt 97.4 kg (214 lb 12.8 oz)   SpO2 98%   BMI 29.13 kg/m²  Body mass index is 29.13 kg/m².    Physical Exam:  Constitutional: Well-developed and well-nourished. Not diaphoretic. No distress.   Ears: Unable to evaluate TMs bilaterally due to cerumen impaction, semipatent.  External ears unremarkable.   Nose: Nares patent. Septum midline. Turbinates without erythema nor edema. No discharge.   Mouth/Throat: Posterior pharynx with moderate erythema no exudates.  Neck: No lymphadenopathy--cervical or supraclavicular.  Cardiovascular: Regular rate and rhythm, S1 and S2 without murmur, rubs, or gallops.    Chest: Effort normal. Clear to auscultation throughout. No adventitious sounds.   Neurological: Alert and oriented x 3.   Psychiatric:  Behavior, mood, and affect are appropriate.  MA/nursing note and vitals reviewed.    LABS: 2021  results reviewed and discussed with the patient, questions answered.    Assessment/Plan:  1. Sinus congestion  Uncontrolled, stable.  Discussed etiology.  Supportive care, warm fluids, rest, supplements such as vitamin C, D and zinc, vicks vapor rub.  Cough can linger for 4 to 6 weeks.  - CoV-2 and Flu A/B by PCR (24 hour In-House): Collect NP swab in VTM; Future  - guaiFENesin ER (MUCINEX) 600 MG TABLET SR 12 HR; Take 1 Tablet by mouth every 12 hours for 10 days.  Dispense: 60 Tablet; Refill: 1    2. Chest congestion  - CoV-2 and Flu A/B by PCR (24 hour In-House): Collect NP swab in VTM; Future  - guaiFENesin ER (MUCINEX) 600 MG TABLET SR 12 HR; Take 1 Tablet by mouth every 12 hours for 10 days.  Dispense: 60 Tablet; Refill: 1    3. Cough  - CoV-2 and Flu A/B by PCR (24 hour In-House): Collect NP swab in VTM; Future  - benzonatate (TESSALON) 100 MG Cap; Take 1 Capsule by mouth 3 times a day as needed for Cough.  Dispense: 60 Capsule; Refill: 0  - triamcinolone (NASACORT) 55  MCG/ACT nasal inhaler; Administer 2 Sprays into affected nostril(S) every day.  Dispense: 16.9 g; Refill: 2  - guaifenesin-codeine (ROBITUSSIN AC) Solution oral solution; Take 5 mL by mouth every 6 hours as needed for Cough for up to 5 days.  Dispense: 120 mL; Refill: 1    4. Flu-like symptoms    5. SOB (shortness of breath)  - albuterol 108 (90 Base) MCG/ACT Aero Soln inhalation aerosol; Inhale 2 Puffs every 6 hours as needed for Shortness of Breath.  Dispense: 8.5 g; Refill: 1  - fluticasone (FLOVENT HFA) 220 MCG/ACT Aerosol; Inhale 1 Puff 2 times a day.  Dispense: 12 g; Refill: 2    6. High triglycerides  - CBC WITHOUT DIFFERENTIAL; Future  - Comp Metabolic Panel; Future  - Lipid Profile; Future  - rosuvastatin (CRESTOR) 10 MG Tab; Take 1 Tablet by mouth every day.  Dispense: 90 Tablet; Refill: 3    7. Polymyalgia (HCC)  - meloxicam (MOBIC) 7.5 MG Tab; Take 1 Tablet by mouth every day.  Dispense: 30 Tablet; Refill: 1    8. Vitamin D deficiency  - vitamin D2, Ergocalciferol, (DRISDOL) 1.25 MG (99596 UT) Cap capsule; Take 1 Capsule by mouth every 7 days.  Dispense: 12 Capsule; Refill: 4       Discussed with patient possible alternative diagnoses, pt is to take all medications as prescribed. If symptoms persist FU w/PCP, if symptoms worsen go to emergency room. If experiencing any side effects from prescribed medications reports to the office immediately or go to emergency room.  Reviewed indication, dosage, usage and potential adverse effects of prescribed medications. Reviewed risks and benefits of treatment plan. Patient verbalizes understanding of all instruction and verbally agrees to plan.    No follow-ups on file. PRN

## 2022-02-04 NOTE — ASSESSMENT & PLAN NOTE
Cholesterol panel from May 2021 WNL except for slightly decreased HDL at 38.  Patient is working on his diet.  On Crestor 10 mg, tolerating well, needing refills today.

## 2022-02-06 LAB
FLUAV RNA SPEC QL NAA+PROBE: NEGATIVE
FLUBV RNA SPEC QL NAA+PROBE: NEGATIVE
SARS-COV-2 RNA RESP QL NAA+PROBE: DETECTED
SPECIMEN SOURCE: ABNORMAL

## 2022-04-05 ENCOUNTER — TELEMEDICINE (OUTPATIENT)
Dept: MEDICAL GROUP | Facility: PHYSICIAN GROUP | Age: 42
End: 2022-04-05
Payer: COMMERCIAL

## 2022-04-05 VITALS — BODY MASS INDEX: 27.77 KG/M2 | HEIGHT: 72 IN | WEIGHT: 205 LBS

## 2022-04-05 DIAGNOSIS — M54.31 RIGHT SIDED SCIATICA: ICD-10-CM

## 2022-04-05 DIAGNOSIS — E55.9 VITAMIN D DEFICIENCY: ICD-10-CM

## 2022-04-05 DIAGNOSIS — M25.50 POLYARTHRALGIA: ICD-10-CM

## 2022-04-05 DIAGNOSIS — Z00.00 ANNUAL PHYSICAL EXAM: ICD-10-CM

## 2022-04-05 DIAGNOSIS — E78.1 HIGH TRIGLYCERIDES: ICD-10-CM

## 2022-04-05 PROCEDURE — 99214 OFFICE O/P EST MOD 30 MIN: CPT | Mod: 95 | Performed by: NURSE PRACTITIONER

## 2022-04-05 ASSESSMENT — FIBROSIS 4 INDEX: FIB4 SCORE: 0.58

## 2022-04-05 NOTE — PROGRESS NOTES
Telemedicine Visit: Established Patient     This encounter was conducted via Zoom.   Verbal consent was obtained. Patient's identity was verified.    Subjective:     Chief Complaint   Patient presents with   • Back Pain   • Nerve Pain     Robert Dahl is a 42 y.o. male presenting for evaluation and management of following problems:    Right sided sciatica  Chronic problem. Noted a yr ago. Getting worse, last visit Rx'd Meloxicam somewhat helpful, however pain is not well controlled.     Polyarthralgia  New problem. Pain in joints both knees, hips, ankles, shoulders, wrists as well as spine, migrating to different joints, exacerbated by stress and fatigue. No paresthesia. 6 mos ago went to spinal UC in [place] and was told has arthritis. Lower back xray, per pt. Also suffers from R side sciatica. Pt did not follow up. Last visit started on Meloxicam.       ROS   Denies any recent fevers or chills. No nausea or vomiting. No chest pains or shortness of breath.     No Known Allergies    Current medicines (including changes today)  Current Outpatient Medications   Medication Sig Dispense Refill   • fluticasone (FLONASE) 50 MCG/ACT nasal spray fluticasone propionate 50 mcg/actuation nasal spray,suspension   Inhale 2 sprays every day by intranasal route.     • gabapentin (NEURONTIN) 300 MG Cap TAKE 1 CAPSULE BY MOUTH THREE TIMES DAILY FOLLOW TITRATION INSTRUCTIONS AS GIVEN     • albuterol 108 (90 Base) MCG/ACT Aero Soln inhalation aerosol Inhale 2 Puffs every 6 hours as needed for Shortness of Breath. 8.5 g 1   • triamcinolone (NASACORT) 55 MCG/ACT nasal inhaler Administer 2 Sprays into affected nostril(S) every day. 16.9 g 2   • meloxicam (MOBIC) 7.5 MG Tab Take 1 Tablet by mouth every day. 30 Tablet 1   • rosuvastatin (CRESTOR) 10 MG Tab Take 1 Tablet by mouth every day. 90 Tablet 3   • vitamin D2, Ergocalciferol, (DRISDOL) 1.25 MG (00118 UT) Cap capsule Take 1 Capsule by mouth every 7 days. 12 Capsule 4     No  current facility-administered medications for this visit.       Patient Active Problem List    Diagnosis Date Noted   • Polyarthralgia 04/05/2022   • Right sided sciatica 04/05/2022   • Abscess of groin 02/03/2022   • Bronchitis 02/03/2022   • Duodenitis 02/03/2022   • Hematemesis 02/03/2022   • Lumbar sprain 02/03/2022   • Nasal congestion 02/03/2022   • Peptic ulcer 02/03/2022   • Upper respiratory infection 02/03/2022   • Urinary outflow obstruction 02/03/2022   • Weight gain 02/03/2022   • Mixed anxiety and depressive disorder 02/03/2022   • Flu-like symptoms 02/03/2022   • Postoperative pain 05/25/2021   • Hospital discharge follow-up 05/06/2021   • Pain in left leg 05/06/2021   • Abdominal wall pain in right flank 01/05/2021   • Elevated fasting blood sugar 01/05/2021   • High triglycerides 12/20/2019   • Vitamin D deficiency 12/20/2019   • DVT (deep venous thrombosis) ([facility]) 12/06/2019   • Insomnia 12/06/2019   • Anxiety and depression 12/06/2019   • Annual physical exam 12/06/2019   • Multiple lung nodules on CT 12/06/2019       Family History   Problem Relation Age of Onset   • Heart Disease Mother    • Hypertension Mother    • Heart Disease Father    • Diabetes Father        He  has a past medical history of DVT (deep venous thrombosis) (HCC) (2020), Heart burn, High cholesterol, Hyperlipidemia, Indigestion, Jaundice (05/24/2021), Psychiatric problem, and Renal disorder.  He  has a past surgical history that includes other and socorro by laparoscopy (5/25/2021).       Objective:   Vitals obtained by patient:  Ht 1.829 m (6')   Wt 93 kg (205 lb)   BMI 27.80 kg/m²      Physical Exam:  General: No acute distress. Well nourished.   HEENT: EOM grossly intact, no scleral icterus, no pharyngeal erythema.   Neck:  No JVD noted at 90 degrees, trachea midline  CVS: Pulse as reported by patient, no visible LE edema.  Resp: Unlabored respiratory effort, no cough or audible wheeze  MSK/Ext: No clubbing or cyanosis  visible appreciated.  Skin: No rashes in visible areas.  Neurological: AOx3. CN III-XII grossly intact. No focal deficits.     LABS: 2021, needs to complete.       Assessment and Plan:   1. Polyarthralgia  Will obtain imaging and discuss findings with pt. OTC Voltaren 1% gel. Will follow up in 2 wks  - DX-HIP-BILATERAL-WITH PELVIS-3/4 VIEWS; Future  - DX-LUMBAR SPINE-2 OR 3 VIEWS; Future  Will obtain labs incl RH factor    2. Right sided sciatica  - DX-HIP-BILATERAL-WITH PELVIS-3/4 VIEWS; Future  - DX-LUMBAR SPINE-2 OR 3 VIEWS; Future  - Referral to Physical Therapy       Follow-up: No follow-ups on file.

## 2022-04-05 NOTE — ASSESSMENT & PLAN NOTE
Chronic problem. Noted a yr ago. Getting worse, last visit Rx'd Meloxicam somewhat helpful, however pain is not well controlled.

## 2022-04-05 NOTE — ASSESSMENT & PLAN NOTE
New problem. Pain in joints both knees, hips, ankles, shoulders, wrists as well as spine, migrating to different joints, exacerbated by stress and fatigue. No paresthesia. 6 mos ago went to spinal UC in [place] and was told has arthritis. Lower back xray, per pt. Also suffers from R side sciatica. Pt did not follow up. Last visit started on Meloxicam.

## 2022-04-12 ENCOUNTER — APPOINTMENT (OUTPATIENT)
Dept: RADIOLOGY | Facility: IMAGING CENTER | Age: 42
End: 2022-04-12
Attending: NURSE PRACTITIONER
Payer: COMMERCIAL

## 2022-04-12 ENCOUNTER — NON-PROVIDER VISIT (OUTPATIENT)
Dept: URGENT CARE | Facility: PHYSICIAN GROUP | Age: 42
End: 2022-04-12
Payer: COMMERCIAL

## 2022-04-12 ENCOUNTER — HOSPITAL ENCOUNTER (OUTPATIENT)
Dept: LAB | Facility: MEDICAL CENTER | Age: 42
End: 2022-04-12
Attending: NURSE PRACTITIONER
Payer: COMMERCIAL

## 2022-04-12 DIAGNOSIS — E55.9 VITAMIN D DEFICIENCY: ICD-10-CM

## 2022-04-12 DIAGNOSIS — M25.50 POLYARTHRALGIA: ICD-10-CM

## 2022-04-12 DIAGNOSIS — M54.31 RIGHT SIDED SCIATICA: ICD-10-CM

## 2022-04-12 DIAGNOSIS — Z00.00 ANNUAL PHYSICAL EXAM: ICD-10-CM

## 2022-04-12 DIAGNOSIS — E78.1 HIGH TRIGLYCERIDES: ICD-10-CM

## 2022-04-12 LAB
25(OH)D3 SERPL-MCNC: 33 NG/ML (ref 30–100)
ALBUMIN SERPL BCP-MCNC: 4.4 G/DL (ref 3.2–4.9)
ALBUMIN/GLOB SERPL: 1.9 G/DL
ALP SERPL-CCNC: 75 U/L (ref 30–99)
ALT SERPL-CCNC: 27 U/L (ref 2–50)
ANION GAP SERPL CALC-SCNC: 9 MMOL/L (ref 7–16)
AST SERPL-CCNC: 24 U/L (ref 12–45)
BILIRUB SERPL-MCNC: 0.2 MG/DL (ref 0.1–1.5)
BUN SERPL-MCNC: 9 MG/DL (ref 8–22)
CALCIUM SERPL-MCNC: 9 MG/DL (ref 8.5–10.5)
CHLORIDE SERPL-SCNC: 108 MMOL/L (ref 96–112)
CHOLEST SERPL-MCNC: 138 MG/DL (ref 100–199)
CO2 SERPL-SCNC: 26 MMOL/L (ref 20–33)
CREAT SERPL-MCNC: 0.97 MG/DL (ref 0.5–1.4)
ERYTHROCYTE [DISTWIDTH] IN BLOOD BY AUTOMATED COUNT: 38.9 FL (ref 35.9–50)
FASTING STATUS PATIENT QL REPORTED: NORMAL
GFR SERPLBLD CREATININE-BSD FMLA CKD-EPI: 101 ML/MIN/1.73 M 2
GLOBULIN SER CALC-MCNC: 2.3 G/DL (ref 1.9–3.5)
GLUCOSE SERPL-MCNC: 113 MG/DL (ref 65–99)
HCT VFR BLD AUTO: 46.8 % (ref 42–52)
HDLC SERPL-MCNC: 30 MG/DL
HGB BLD-MCNC: 16.1 G/DL (ref 14–18)
LDLC SERPL CALC-MCNC: 43 MG/DL
MCH RBC QN AUTO: 31.4 PG (ref 27–33)
MCHC RBC AUTO-ENTMCNC: 34.4 G/DL (ref 33.7–35.3)
MCV RBC AUTO: 91.2 FL (ref 81.4–97.8)
PLATELET # BLD AUTO: 275 K/UL (ref 164–446)
PMV BLD AUTO: 9.9 FL (ref 9–12.9)
POTASSIUM SERPL-SCNC: 4.6 MMOL/L (ref 3.6–5.5)
PROT SERPL-MCNC: 6.7 G/DL (ref 6–8.2)
RBC # BLD AUTO: 5.13 M/UL (ref 4.7–6.1)
RHEUMATOID FACT SER IA-ACNC: <10 IU/ML (ref 0–14)
SODIUM SERPL-SCNC: 143 MMOL/L (ref 135–145)
TRIGL SERPL-MCNC: 326 MG/DL (ref 0–149)
TSH SERPL DL<=0.005 MIU/L-ACNC: 0.65 UIU/ML (ref 0.38–5.33)
WBC # BLD AUTO: 6.5 K/UL (ref 4.8–10.8)

## 2022-04-12 PROCEDURE — 86431 RHEUMATOID FACTOR QUANT: CPT

## 2022-04-12 PROCEDURE — 80061 LIPID PANEL: CPT

## 2022-04-12 PROCEDURE — 72100 X-RAY EXAM L-S SPINE 2/3 VWS: CPT | Mod: TC,FY | Performed by: PHYSICIAN ASSISTANT

## 2022-04-12 PROCEDURE — 82306 VITAMIN D 25 HYDROXY: CPT

## 2022-04-12 PROCEDURE — 36415 COLL VENOUS BLD VENIPUNCTURE: CPT

## 2022-04-12 PROCEDURE — 80053 COMPREHEN METABOLIC PANEL: CPT

## 2022-04-12 PROCEDURE — 84443 ASSAY THYROID STIM HORMONE: CPT

## 2022-04-12 PROCEDURE — 85027 COMPLETE CBC AUTOMATED: CPT

## 2022-04-12 PROCEDURE — 73522 X-RAY EXAM HIPS BI 3-4 VIEWS: CPT | Mod: TC,FY | Performed by: PHYSICIAN ASSISTANT

## 2022-04-14 ENCOUNTER — TELEMEDICINE (OUTPATIENT)
Dept: MEDICAL GROUP | Facility: PHYSICIAN GROUP | Age: 42
End: 2022-04-14
Payer: COMMERCIAL

## 2022-04-14 DIAGNOSIS — M40.46 EXAGGERATED LORDOSIS OF LUMBAR SPINE: ICD-10-CM

## 2022-04-14 DIAGNOSIS — M54.31 RIGHT SIDED SCIATICA: ICD-10-CM

## 2022-04-14 DIAGNOSIS — E78.1 HIGH TRIGLYCERIDES: ICD-10-CM

## 2022-04-14 DIAGNOSIS — M35.3 POLYMYALGIA (HCC): ICD-10-CM

## 2022-04-14 DIAGNOSIS — S33.5XXD LUMBAR SPRAIN, SUBSEQUENT ENCOUNTER: ICD-10-CM

## 2022-04-14 PROCEDURE — 99214 OFFICE O/P EST MOD 30 MIN: CPT | Mod: 95 | Performed by: NURSE PRACTITIONER

## 2022-04-14 RX ORDER — ROSUVASTATIN CALCIUM 20 MG/1
20 TABLET, COATED ORAL EVERY EVENING
Qty: 30 TABLET | Refills: 11 | Status: SHIPPED | OUTPATIENT
Start: 2022-04-14

## 2022-04-14 RX ORDER — MELOXICAM 7.5 MG/1
7.5-15 TABLET ORAL DAILY
Qty: 60 TABLET | Refills: 1 | Status: SHIPPED | OUTPATIENT
Start: 2022-04-14

## 2022-04-14 RX ORDER — MIRTAZAPINE 15 MG/1
15 TABLET, FILM COATED ORAL
COMMUNITY
Start: 2022-03-31 | End: 2022-09-07 | Stop reason: SDUPTHER

## 2022-04-14 RX ORDER — ESCITALOPRAM OXALATE 20 MG/1
TABLET ORAL
COMMUNITY
Start: 2022-04-01 | End: 2022-08-24 | Stop reason: SDUPTHER

## 2022-04-14 RX ORDER — ESCITALOPRAM OXALATE 10 MG/1
10 TABLET ORAL DAILY
COMMUNITY
Start: 2022-02-02 | End: 2022-04-14

## 2022-04-14 RX ORDER — ALPRAZOLAM 0.5 MG/1
0.5 TABLET ORAL 3 TIMES DAILY PRN
COMMUNITY
Start: 2022-03-19 | End: 2022-04-14

## 2022-04-14 NOTE — ASSESSMENT & PLAN NOTE
Xray 4/2022  IMPRESSION:     No radiographic evidence of acute traumatic injury or osteoarthritis     Mild right labral calcification may indicate labral degeneration.     Pain positional triggered by movement. Meloxicam somewhat helpful.

## 2022-04-14 NOTE — ASSESSMENT & PLAN NOTE
Results for LUDMILA BUTLER (MRN [id number]) as of 4/14/2022 16:28   Ref. Range 4/12/2022 10:28   Cholesterol,Tot Latest Ref Range: 100 - 199 mg/dL 138   Triglycerides Latest Ref Range: 0 - 149 mg/dL 326 (H)   HDL Latest Ref Range: >=40 mg/dL 30 (A)   LDL Latest Ref Range: <100 mg/dL 43   On Crestor 10 mg, was off for a couple months, back on it for a few weeks.

## 2022-04-14 NOTE — ASSESSMENT & PLAN NOTE
4/12/22 imaging. Mild degenerative disc disease and straightening of the normal lordosis.  Daily pain, tried chiro in December somewhat helped. Taking Meloxicam.

## 2022-04-15 NOTE — PROGRESS NOTES
Telemedicine Visit: Established Patient     This encounter was conducted via Zoom.   Verbal consent was obtained. Patient's identity was verified.    Subjective:     Chief Complaint   Patient presents with   • Follow-Up     Lab results      Robert Dahl is a 42 y.o. male presenting for evaluation and management of following problems:    High triglycerides  Results for LUDMILA DAHL (MRN [id number]) as of 4/14/2022 16:28   Ref. Range 4/12/2022 10:28   Cholesterol,Tot Latest Ref Range: 100 - 199 mg/dL 138   Triglycerides Latest Ref Range: 0 - 149 mg/dL 326 (H)   HDL Latest Ref Range: >=40 mg/dL 30 (A)   LDL Latest Ref Range: <100 mg/dL 43   On Crestor 10 mg, was off for a couple months, back on it for a few weeks.     Lumbar sprain  4/12/22 imaging. Mild degenerative disc disease and straightening of the normal lordosis.  Daily pain, tried chiro in December somewhat helped. Taking Meloxicam.     Right sided sciatica  Xray 4/2022  IMPRESSION:     No radiographic evidence of acute traumatic injury or osteoarthritis     Mild right labral calcification may indicate labral degeneration.     Pain positional triggered by movement. Meloxicam somewhat helpful.                      ROS   Denies any recent fevers or chills. No nausea or vomiting. No chest pains or shortness of breath.     No Known Allergies    Current medicines (including changes today)  Current Outpatient Medications   Medication Sig Dispense Refill   • escitalopram (LEXAPRO) 20 MG tablet      • mirtazapine (REMERON) 15 MG Tab Take 15 mg by mouth at bedtime as needed.     • rosuvastatin (CRESTOR) 20 MG Tab Take 1 Tablet by mouth every evening. 30 Tablet 11   • meloxicam (MOBIC) 7.5 MG Tab Take 1-2 Tablets by mouth every day. 60 Tablet 1   • fluticasone (FLONASE) 50 MCG/ACT nasal spray fluticasone propionate 50 mcg/actuation nasal spray,suspension   Inhale 2 sprays every day by intranasal route.     • gabapentin (NEURONTIN) 300 MG Cap TAKE 1  CAPSULE BY MOUTH THREE TIMES DAILY FOLLOW TITRATION INSTRUCTIONS AS GIVEN     • vitamin D2, Ergocalciferol, (DRISDOL) 1.25 MG (73986 UT) Cap capsule Take 1 Capsule by mouth every 7 days. 12 Capsule 4     No current facility-administered medications for this visit.       Patient Active Problem List    Diagnosis Date Noted   • Polyarthralgia 04/05/2022   • Right sided sciatica 04/05/2022   • Abscess of groin 02/03/2022   • Bronchitis 02/03/2022   • Duodenitis 02/03/2022   • Hematemesis 02/03/2022   • Lumbar sprain 02/03/2022   • Nasal congestion 02/03/2022   • Peptic ulcer 02/03/2022   • Upper respiratory infection 02/03/2022   • Urinary outflow obstruction 02/03/2022   • Weight gain 02/03/2022   • Mixed anxiety and depressive disorder 02/03/2022   • Flu-like symptoms 02/03/2022   • Postoperative pain 05/25/2021   • Hospital discharge follow-up 05/06/2021   • Pain in left leg 05/06/2021   • Abdominal wall pain in right flank 01/05/2021   • Elevated fasting blood sugar 01/05/2021   • High triglycerides 12/20/2019   • Vitamin D deficiency 12/20/2019   • DVT (deep venous thrombosis) (HCC) 12/06/2019   • Insomnia 12/06/2019   • Anxiety and depression 12/06/2019   • Annual physical exam 12/06/2019   • Multiple lung nodules on CT 12/06/2019       Family History   Problem Relation Age of Onset   • Heart Disease Mother    • Hypertension Mother    • Heart Disease Father    • Diabetes Father        He  has a past medical history of DVT (deep venous thrombosis) (HCC) (2020), Heart burn, High cholesterol, Hyperlipidemia, Indigestion, Jaundice (05/24/2021), Psychiatric problem, and Renal disorder.  He  has a past surgical history that includes other and socorro by laparoscopy (5/25/2021).       Objective:   Vitals obtained by patient:  There were no vitals taken for this visit.     Physical Exam:  General: No acute distress. Well nourished.   HEENT: EOM grossly intact, no scleral icterus, no pharyngeal erythema.   Neck:  No JVD  noted at 90 degrees, trachea midline  CVS: Pulse as reported by patient, no visible LE edema.  Resp: Unlabored respiratory effort, no cough or audible wheeze  MSK/Ext: No clubbing or cyanosis visible appreciated.  Skin: No rashes in visible areas.  Neurological: AOx3. CN III-XII grossly intact. No focal deficits.     LABS and xrays: 4/2022  results reviewed and discussed with the patient, questions answered.    Assessment and Plan:   1. High triglycerides  Uncontrolled, will increase Crestor to 20 mg.  We will recheck labs in 10 weeks.  - rosuvastatin (CRESTOR) 20 MG Tab; Take 1 Tablet by mouth every evening.  Dispense: 30 Tablet; Refill: 11  - Lipid Profile; Future    2. Lumbar sprain, subsequent encounter  - Referral to Pain Clinic  - Referral to Orthopedics  - Referral to Physical Therapy    3. Polymyalgia (HCC)  - meloxicam (MOBIC) 7.5 MG Tab; Take 1-2 Tablets by mouth every day.  Dispense: 60 Tablet; Refill: 1  - Referral to Physical Therapy    4. Right sided sciatica  - Referral to Orthopedics  - Referral to Physical Therapy    5. Exaggerated lordosis of lumbar spine  - Referral to Physical Therapy       Follow-up: No follow-ups on file.

## 2022-04-22 ENCOUNTER — OFFICE VISIT (OUTPATIENT)
Dept: PHYSICAL MEDICINE AND REHAB | Facility: MEDICAL CENTER | Age: 42
End: 2022-04-22
Payer: COMMERCIAL

## 2022-04-22 VITALS
TEMPERATURE: 98.6 F | HEIGHT: 72 IN | BODY MASS INDEX: 30.22 KG/M2 | OXYGEN SATURATION: 97 % | HEART RATE: 72 BPM | SYSTOLIC BLOOD PRESSURE: 142 MMHG | DIASTOLIC BLOOD PRESSURE: 96 MMHG | WEIGHT: 223.11 LBS

## 2022-04-22 DIAGNOSIS — M54.41 CHRONIC RIGHT-SIDED LOW BACK PAIN WITH RIGHT-SIDED SCIATICA: Primary | ICD-10-CM

## 2022-04-22 DIAGNOSIS — G89.29 CHRONIC RIGHT-SIDED LOW BACK PAIN WITH RIGHT-SIDED SCIATICA: Primary | ICD-10-CM

## 2022-04-22 PROCEDURE — 99204 OFFICE O/P NEW MOD 45 MIN: CPT | Performed by: STUDENT IN AN ORGANIZED HEALTH CARE EDUCATION/TRAINING PROGRAM

## 2022-04-22 ASSESSMENT — PATIENT HEALTH QUESTIONNAIRE - PHQ9
SUM OF ALL RESPONSES TO PHQ QUESTIONS 1-9: 21
5. POOR APPETITE OR OVEREATING: 3 - NEARLY EVERY DAY
CLINICAL INTERPRETATION OF PHQ2 SCORE: 4

## 2022-04-22 ASSESSMENT — FIBROSIS 4 INDEX: FIB4 SCORE: 0.67

## 2022-04-22 ASSESSMENT — PAIN SCALES - GENERAL: PAINLEVEL: 7=MODERATE-SEVERE PAIN

## 2022-04-22 NOTE — PROGRESS NOTES
"New Patient Note    Interventional Pain and Spine  Physiatry (Physical Medicine and Rehabilitation)     Patient Name: Robert Dahl  : 1980  Date of Service: 2022  PCP: EMILY Martell  Referring Provider: Marielle Kemp A.P.R*    Chief Complaint:   Chief Complaint   Patient presents with   • New Patient     Lumbar sprain       HPI  HISTORY (2022):  Robert Dahl is a 42 y.o. male who presents today with pain at his low back shooting down his right leg that started over a year ago with no inciting event.  He also endorses pain at multiple locations including his bilateral shoulders, bilateral knees, and right groin.    His pain at its best-worse level during the course of the day is 0-\"10+\"/10, respectively. Pain right now is 4/10 on the numeric pain scale.  He can't identify a specific provocative or alleviating factor for his pain. Onset of pain feels random and can happen with lying supine or with walking.  Shooting pain can occur with prolonged sitting or standing. Shooting nerve pain down his leg wakes him up from sleep and interferes with things he wants to do. The patient otherwise denies new weakness, numbness, or bladder/bowel incontinence.      Endorses random swelling in his right forearm and in both of his feet. Endorses tingling in his fingers, numbness in hands and feet worse with swelling. Endorses subjective weakness with bilateral hand . Previously worked in logistics, involving carrying heavy loads.  No longer working as of 2 weeks ago.  Pain has not changed since he stopped working.    Denies family history of systemic pain.    The patient states that he has done a home exercise program for physical therapy as given to him by a pain urgent care which he does at home.    Patient has tried the following medications with varied success (current meds in bold):   Gabapentin PRN - relief   meloxicam - slight relief generally but no relief when pain is " severe    Therapeutic modalities and interventional therapies to date include:  -No injections    Medical history includes DVT after a long car ride, insomnia, anxiety and depression, vitamin D deficiency    Psychological testing for pain as depression and pain commonly coexist and need to both be treated.     Opioid Risk Score: 1     Interpretation of Opioid Risk Score   Score 0-3 = Low risk of abuse. Do UDS at least once per year.  Score 4-7 = Moderate risk of abuse. Do UDS 1-4 times per year.  Score 8+ = High risk of abuse. Refer to specialist.    PHQ  Depression Screen (PHQ-2/PHQ-9) 6/11/2020 2/3/2022 4/22/2022   PHQ-2 Total Score 2 6 4   PHQ-9 Total Score 7 19 21       Interpretation of PHQ-9 Total Score   Score Severity   1-4 No Depression   5-9 Mild Depression   10-14 Moderate Depression   15-19 Moderately Severe Depression   20-27 Severe Depression      Medical records review:  I reviewed the note from the referring provider Marielle Kemp A.P.R* including the note dated 4/14/22. Referred to pain, ortho, and PT at that visit.    ROS:   Red Flags ROS:   Fever, Chills, Sweats: Denies  Involuntary Weight Loss: Denies  Bladder Incontinence: Denies  Bowel Incontinence: denies  Saddle Anesthesia: Denies    All other systems reviewed and negative.     PMHx:   Past Medical History:   Diagnosis Date   • DVT (deep venous thrombosis) (HCC) 2020    left thigh   • Heart burn    • High cholesterol    • Hyperlipidemia    • Indigestion    • Jaundice 05/24/2021    pt states wife has told him his eyes look yellow recently   • Psychiatric problem     depression anxiety   • Renal disorder     hx kidney stones       PSHx:   Past Surgical History:   Procedure Laterality Date   • ARON BY LAPAROSCOPY  5/25/2021    Procedure: CHOLECYSTECTOMY, LAPAROSCOPIC.;  Surgeon: Ziggy Adam M.D.;  Location: SURGERY SAME DAY [facility];  Service: General   • OTHER      wisdom teeth       Family Hx:   Family History   Problem Relation Age of  Onset   • Heart Disease Mother    • Hypertension Mother    • Heart Disease Father    • Diabetes Father        Social Hx:  Social History     Socioeconomic History   • Marital status:      Spouse name: Not on file   • Number of children: Not on file   • Years of education: Not on file   • Highest education level: Not on file   Occupational History   • Occupation: [employer]   Tobacco Use   • Smoking status: Former Smoker     Packs/day: 0.50     Years: 5.00     Pack years: 2.50     Quit date: 2014     Years since quittin.3   • Smokeless tobacco: Never Used   • Tobacco comment: quit    Vaping Use   • Vaping Use: Every day   • Substances: THC, q hs   • Devices: Disposable   Substance and Sexual Activity   • Alcohol use: Not Currently   • Drug use: Yes     Frequency: 7.0 times per week     Types: Marijuana     Comment: CBD, inhaler   • Sexual activity: Yes     Partners: Female   Other Topics Concern   • Not on file   Social History Narrative   • Not on file     Social Determinants of Health     Financial Resource Strain: Not on file   Food Insecurity: Not on file   Transportation Needs: Not on file   Physical Activity: Not on file   Stress: Not on file   Social Connections: Not on file   Intimate Partner Violence: Not on file   Housing Stability: Not on file       Allergies:  No Known Allergies    Medications: reviewed on epic.   Outpatient Medications Marked as Taking for the 22 encounter (Office Visit) with Sol Serrano M.D.   Medication Sig Dispense Refill   • escitalopram (LEXAPRO) 20 MG tablet      • mirtazapine (REMERON) 15 MG Tab Take 15 mg by mouth at bedtime as needed.     • rosuvastatin (CRESTOR) 20 MG Tab Take 1 Tablet by mouth every evening. 30 Tablet 11   • meloxicam (MOBIC) 7.5 MG Tab Take 1-2 Tablets by mouth every day. 60 Tablet 1   • fluticasone (FLONASE) 50 MCG/ACT nasal spray fluticasone propionate 50 mcg/actuation nasal spray,suspension   Inhale 2 sprays every day by  intranasal route.     • gabapentin (NEURONTIN) 300 MG Cap TAKE 1 CAPSULE BY MOUTH THREE TIMES DAILY FOLLOW TITRATION INSTRUCTIONS AS GIVEN     • vitamin D2, Ergocalciferol, (DRISDOL) 1.25 MG (62241 UT) Cap capsule Take 1 Capsule by mouth every 7 days. 12 Capsule 4        Current Outpatient Medications on File Prior to Visit   Medication Sig Dispense Refill   • escitalopram (LEXAPRO) 20 MG tablet      • mirtazapine (REMERON) 15 MG Tab Take 15 mg by mouth at bedtime as needed.     • rosuvastatin (CRESTOR) 20 MG Tab Take 1 Tablet by mouth every evening. 30 Tablet 11   • meloxicam (MOBIC) 7.5 MG Tab Take 1-2 Tablets by mouth every day. 60 Tablet 1   • fluticasone (FLONASE) 50 MCG/ACT nasal spray fluticasone propionate 50 mcg/actuation nasal spray,suspension   Inhale 2 sprays every day by intranasal route.     • gabapentin (NEURONTIN) 300 MG Cap TAKE 1 CAPSULE BY MOUTH THREE TIMES DAILY FOLLOW TITRATION INSTRUCTIONS AS GIVEN     • vitamin D2, Ergocalciferol, (DRISDOL) 1.25 MG (08639 UT) Cap capsule Take 1 Capsule by mouth every 7 days. 12 Capsule 4     No current facility-administered medications on file prior to visit.         EXAMINATION     Physical Exam:   /96 (BP Location: Right arm, Patient Position: Sitting, BP Cuff Size: Adult)   Pulse 72   Temp 37 °C (98.6 °F) (Temporal)   Ht 1.829 m (6')   Wt 101 kg (223 lb 1.7 oz)   SpO2 97%     Constitutional:   Body Habitus: Body mass index is 30.26 kg/m².  Cooperation: Fully cooperates with exam  Appearance: Well-groomed, well-nourished.    Eyes: No scleral icterus to suggest severe liver disease, no proptosis to suggest severe hyperthyroidism    ENT -no obvious auditory deficits, no noticeable facial droop     Skin -no rashes or lesions noted     Respiratory-  breathing comfortably on room air, no audible wheezing    Cardiovascular-distal extremities warm and well perfused.  No lower extremity edema is noted.     Gastrointestinal - no obvious abdominal masses,  non-distended    Psychiatric- alert and oriented ×3. Normal affect.     Gait - normal gait, no use of ambulatory device, nonantalgic.  Tandem gait and heel walking and toe walking intact.    Musculoskeletal and Neuro -       Cervical spine   Inspection: No deformities of the skin over the cervical spine. No rashes or lesions.    full active range of motion in all directions    Spurling's sign  negative bilaterally  Cervical facet loading maneuver  negative bilaterally    No signs of muscular atrophy in bilateral upper extremities     Key points for the international standards for neurological classification of spinal cord injury (ISNCSCI) to light touch.     Dermatome R L   C4 2 2   C5 2 2   C6 2 2   C7 2 2   C8 2 2   T1 2 2   T2 2 2       Motor Exam Upper Extremities   ? Myotome R L   Shoulder abduction C5 5 5   Elbow flexion C5 5 5   Wrist extension C6 5 5   Elbow extension C7 5 5   Finger flexion C8 5 5   Finger abduction T1 5 5     Bilateral hands:   Inspection: No swelling, deformities, or rashes. Symmetric appearing thenar and hyperthenar regions bilaterally.      Special tests:  Tinel's at the wrist over the median nerve negative bilaterally  Carpal tunnel compression: negative bilaterally  Phalen's test: negative bilaterally  Tinel's at the cubital tunnel: negative bilaterally      Thoracic/Lumbar Spine/Sacral Spine/Hips   Inspection: No evidence of atrophy in bilateral lower extremities throughout     ROM: limited active range of motion with lumbar flexion, lateral flexion, and rotation bilaterally.   There is limited active range of motion with lumbar extension    Facet loading maneuver positive bilaterally    Palpation:   No tenderness to palpation over the midline of lumbosacral spine, paraspinal muscles bilaterally, lumbar facets bilaterally, sacroiliac joints bilaterally, PSIS bilaterally and greater trochanters bilaterally.     Lumbar spine /hip provocative exam maneuvers  Straight leg raise positive  on right, negative on left  Slump-sit test positive on right, negative on left  FADIR test negative bilaterally    SI joint tests  SUZAN test negative bilaterally  Thigh thrust test negative bilaterally    No tenderness to palpation of bilateral knees.    Key points for the international standards for neurological classification of spinal cord injury (ISNCSCI) to light touch.   Dermatome R L   L2 2 2   L3 2 2   L4 2 2   L5 2 2   S1 2 2   S2 2 2       Motor Exam Lower Extremities  ? Myotome R L   Hip flexion L2 5 5   Knee extension L3 5 5   Ankle dorsiflexion L4 5 5   Toe extension L5 5 5   Ankle plantarflexion S1 5 5       Reflexes  ?  R L   Patella  2+ 2+   Achilles   2+ 2+     Clonus of the ankle negative bilaterally       MEDICAL DECISION MAKING    Medical records review: see under HPI section.     DATA    Labs: Personally reviewed at today's visit:     Lab Results   Component Value Date/Time    SODIUM 143 04/12/2022 10:28 AM    POTASSIUM 4.6 04/12/2022 10:28 AM    CHLORIDE 108 04/12/2022 10:28 AM    CO2 26 04/12/2022 10:28 AM    ANION 9.0 04/12/2022 10:28 AM    GLUCOSE 113 (H) 04/12/2022 10:28 AM    BUN 9 04/12/2022 10:28 AM    CREATININE 0.97 04/12/2022 10:28 AM    CALCIUM 9.0 04/12/2022 10:28 AM    ASTSGOT 24 04/12/2022 10:28 AM    ALTSGPT 27 04/12/2022 10:28 AM    TBILIRUBIN 0.2 04/12/2022 10:28 AM    ALBUMIN 4.4 04/12/2022 10:28 AM    TOTPROTEIN 6.7 04/12/2022 10:28 AM    GLOBULIN 2.3 04/12/2022 10:28 AM    AGRATIO 1.9 04/12/2022 10:28 AM       Lab Results   Component Value Date/Time    PROTHROMBTM 12.8 10/30/2021 07:59 AM    INR 0.99 10/30/2021 07:59 AM        Lab Results   Component Value Date/Time    WBC 6.5 04/12/2022 10:28 AM    RBC 5.13 04/12/2022 10:28 AM    HEMOGLOBIN 16.1 04/12/2022 10:28 AM    HEMATOCRIT 46.8 04/12/2022 10:28 AM    MCV 91.2 04/12/2022 10:28 AM    MCH 31.4 04/12/2022 10:28 AM    MCHC 34.4 04/12/2022 10:28 AM    MPV 9.9 04/12/2022 10:28 AM    NEUTSPOLYS 64.70 10/30/2021 07:59 AM     LYMPHOCYTES 23.80 10/30/2021 07:59 AM    MONOCYTES 8.60 10/30/2021 07:59 AM    EOSINOPHILS 1.90 10/30/2021 07:59 AM    BASOPHILS 0.70 10/30/2021 07:59 AM        Lab Results   Component Value Date/Time    HBA1C 5.2 2019 02:10 PM        Imaging:   I personally reviewed following images, these are my reads  XR lumbar spine 2022  Very mild facet arthropathy at bilateral L5-S1.  Anterior body osteophytes at L3, L4, and L5.  Alignment intact.      IMAGING radiology reads. I reviewed the following radiology reads       Results for orders placed in visit on 22    DX-LUMBAR SPINE-2 OR 3 VIEWS    Impression  Mild degenerative disc disease and straightening of the normal lordosis                         Diagnosis  Visit Diagnoses     ICD-10-CM   1. Chronic right-sided low back pain with right-sided sciatica  M54.41    G89.29         ASSESSMENT AND PLAN:  Robert Dahl ( 1980) is a male presenting with 1 year history of low back pain that radiates down his right leg with exam today notable for positive slump and straight leg raise test on the right, reproducing his symptoms.  Symptoms appear to be consistent with right lumbar radiculitis.     Of note he also endorses random numbness and tingling and swelling that was not apparent on exam today.  Negative provocative exam testing for carpal tunnel syndrome, cubital tunnel syndrome, and cervical radiculopathy.  He also endorses random polyarthralgias including of his shoulders and knees and ankle without known inciting event, unable to be reproduced on exam today     Robert was seen today for new patient.    Diagnoses and all orders for this visit:    Chronic right-sided low back pain with right-sided sciatica  -     MR-LUMBAR SPINE-W/O; Future          PLAN  Physical Therapy:  Advised patient to continue with home exercise program was provided to him by a physical therapist.  Also gave him a home exercise program handout.       Diagnostic  workup: as above.  Evaluate for nerve root impingement on the right side, possibly L5 and S1 dermatomes.    Medications:   - Advised patient to take gabapentin consistently every night which could result in improved pain relief compared to as needed dosing  -Agree with meloxicam for a short course  -  reviewed, records do not demonstrate increased risk of opioid abuse.    Interventions: Pending MRI review    Follow-up: After imaging complete    Orders Placed This Encounter   • MR-LUMBAR SPINE-W/O       Sol Serrano MD  Interventional Pain and Spine  Physical Medicine and Rehabilitation  [facility] Marielle Kemp, A.P.R*     The above note documents my personal evaluation of this patient. In addition, I have reviewed and confirmed with the patient and MA the supportive information documented in today's Patient Health Questionnaire and Office Note.     Please note that this dictation was created using voice recognition software. I have made every reasonable attempt to correct obvious errors, but I expect that there are errors of grammar and possibly content that I did not discover before finalizing the note.

## 2022-05-05 ENCOUNTER — HOSPITAL ENCOUNTER (OUTPATIENT)
Dept: RADIOLOGY | Facility: MEDICAL CENTER | Age: 42
End: 2022-05-05
Attending: STUDENT IN AN ORGANIZED HEALTH CARE EDUCATION/TRAINING PROGRAM
Payer: COMMERCIAL

## 2022-05-05 DIAGNOSIS — G89.29 CHRONIC RIGHT-SIDED LOW BACK PAIN WITH RIGHT-SIDED SCIATICA: ICD-10-CM

## 2022-05-05 DIAGNOSIS — M54.41 CHRONIC RIGHT-SIDED LOW BACK PAIN WITH RIGHT-SIDED SCIATICA: ICD-10-CM

## 2022-05-05 PROCEDURE — 72148 MRI LUMBAR SPINE W/O DYE: CPT

## 2022-05-06 ENCOUNTER — TELEPHONE (OUTPATIENT)
Dept: PHYSICAL MEDICINE AND REHAB | Facility: MEDICAL CENTER | Age: 42
End: 2022-05-06
Payer: COMMERCIAL

## 2022-05-06 NOTE — TELEPHONE ENCOUNTER
Called pt/LM in an effort to schedule MRI review visit with Dr. Serrano. Requested call back when possible.

## 2022-05-10 ENCOUNTER — OFFICE VISIT (OUTPATIENT)
Dept: PHYSICAL MEDICINE AND REHAB | Facility: MEDICAL CENTER | Age: 42
End: 2022-05-10
Payer: COMMERCIAL

## 2022-05-10 VITALS
SYSTOLIC BLOOD PRESSURE: 132 MMHG | OXYGEN SATURATION: 96 % | HEART RATE: 69 BPM | TEMPERATURE: 97.6 F | BODY MASS INDEX: 29.95 KG/M2 | DIASTOLIC BLOOD PRESSURE: 94 MMHG | WEIGHT: 221.12 LBS | HEIGHT: 72 IN

## 2022-05-10 DIAGNOSIS — G89.29 CHRONIC RIGHT-SIDED LOW BACK PAIN WITH RIGHT-SIDED SCIATICA: ICD-10-CM

## 2022-05-10 DIAGNOSIS — M54.41 CHRONIC RIGHT-SIDED LOW BACK PAIN WITH RIGHT-SIDED SCIATICA: ICD-10-CM

## 2022-05-10 DIAGNOSIS — G57.01 PIRIFORMIS SYNDROME, RIGHT: Primary | ICD-10-CM

## 2022-05-10 PROCEDURE — 99214 OFFICE O/P EST MOD 30 MIN: CPT | Performed by: STUDENT IN AN ORGANIZED HEALTH CARE EDUCATION/TRAINING PROGRAM

## 2022-05-10 RX ORDER — CYCLOBENZAPRINE HCL 10 MG
5-10 TABLET ORAL NIGHTLY PRN
Qty: 30 TABLET | Refills: 5 | Status: SHIPPED | OUTPATIENT
Start: 2022-05-10

## 2022-05-10 ASSESSMENT — PATIENT HEALTH QUESTIONNAIRE - PHQ9
SUM OF ALL RESPONSES TO PHQ QUESTIONS 1-9: 17
5. POOR APPETITE OR OVEREATING: 2 - MORE THAN HALF THE DAYS
CLINICAL INTERPRETATION OF PHQ2 SCORE: 3

## 2022-05-10 ASSESSMENT — PAIN SCALES - GENERAL: PAINLEVEL: 5=MODERATE PAIN

## 2022-05-10 ASSESSMENT — FIBROSIS 4 INDEX: FIB4 SCORE: 0.67

## 2022-05-10 NOTE — PROGRESS NOTES
"Follow-up patient Note    Interventional Pain and Spine  Physiatry (Physical Medicine and Rehabilitation)     Patient Name: Robert Dahl  : 1980  Date of Service: 5/10/2022      Chief Complaint:   Chief Complaint   Patient presents with   • Follow-Up     Chronic right-sided low back pain with right-sided sciatica       HISTORY (2022):  Robert Dahl is a 42 y.o. male who presents today with pain at his low back shooting down his right leg that started over a year ago with no inciting event.  He also endorses pain at multiple locations including his bilateral shoulders, bilateral knees, and right groin.     His pain at its best-worse level during the course of the day is 0-\"10+\"/10, respectively. Pain right now is 4/10 on the numeric pain scale.  He can't identify a specific provocative or alleviating factor for his pain. Onset of pain feels random and can happen with lying supine or with walking.  Shooting pain can occur with prolonged sitting or standing. Shooting nerve pain down his leg wakes him up from sleep and interferes with things he wants to do. The patient otherwise denies new weakness, numbness, or bladder/bowel incontinence.       Endorses random swelling in his right forearm and in both of his feet. Endorses tingling in his fingers, numbness in hands and feet worse with swelling. Endorses subjective weakness with bilateral hand . Previously worked in logistics, involving carrying heavy loads.  No longer working as of 2 weeks ago.  Pain has not changed since he stopped working.     Denies family history of systemic pain.     The patient states that he has done a home exercise program for physical therapy as given to him by a pain urgent care which he does at home.     Patient has tried the following medications with varied success (current meds in bold):   Gabapentin PRN - relief   meloxicam - slight relief generally but no relief when pain is severe     Therapeutic " modalities and interventional therapies to date include:  -No injections     Medical history includes DVT after a long car ride, insomnia, anxiety and depression, vitamin D deficiency    HPI  Today's visit   Robert Dahl ( 1980) is a male with The primary encounter diagnosis was Piriformis syndrome, right. A diagnosis of Chronic right-sided low back pain with right-sided sciatica was also pertinent to this visit.    Ongoing tension-like pain in the right low back and right glute that radiates down his posterior right leg to his calf with associated numbness and tingling.  States that it does feel like he is sitting on something tight in his right glute.  This pain worsens with prolonged sitting such as a long car ride.  He is taking meloxicam with unsure relief.  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    Presents today for MRI review.    Pain severity 5/10 currently  Pt denies new numbness, tingling, or weakness.    ROS:   Red Flags ROS:  Fever, Chills, Sweats: Denies  Involuntary Weight Loss: Denies  Bladder Incontinence: Denies  Bowel Incontinence: denies  Saddle Anesthesia: Denies    All other systems reviewed and negative.     PMHx:   Past Medical History:   Diagnosis Date   • DVT (deep venous thrombosis) (HCC) 2020    left thigh   • Heart burn    • High cholesterol    • Hyperlipidemia    • Indigestion    • Jaundice 2021    pt states wife has told him his eyes look yellow recently   • Psychiatric problem     depression anxiety   • Renal disorder     hx kidney stones       PSHx:   Past Surgical History:   Procedure Laterality Date   • ARON BY LAPAROSCOPY  2021    Procedure: CHOLECYSTECTOMY, LAPAROSCOPIC.;  Surgeon: Ziggy Adam M.D.;  Location: SURGERY SAME DAY [facility];  Service: General   • OTHER      wisdom teeth       Family Hx:   Family History   Problem Relation Age of Onset   • Heart Disease Mother    •  Hypertension Mother    • Heart Disease Father    • Diabetes Father        Social Hx:  Social History     Socioeconomic History   • Marital status:      Spouse name: Not on file   • Number of children: Not on file   • Years of education: Not on file   • Highest education level: Not on file   Occupational History   • Occupation: [employer]   Tobacco Use   • Smoking status: Former Smoker     Packs/day: 0.50     Years: 5.00     Pack years: 2.50     Quit date: 2014     Years since quittin.4   • Smokeless tobacco: Never Used   • Tobacco comment: quit    Vaping Use   • Vaping Use: Every day   • Substances: THC, q hs   • Devices: Disposable   Substance and Sexual Activity   • Alcohol use: Not Currently   • Drug use: Yes     Frequency: 7.0 times per week     Types: Marijuana, Inhaled     Comment: CBD, inhaler   • Sexual activity: Yes     Partners: Female   Other Topics Concern   • Not on file   Social History Narrative   • Not on file     Social Determinants of Health     Financial Resource Strain: Not on file   Food Insecurity: Not on file   Transportation Needs: Not on file   Physical Activity: Not on file   Stress: Not on file   Social Connections: Not on file   Intimate Partner Violence: Not on file   Housing Stability: Not on file       Allergies:  No Known Allergies    Medications: reviewed on epic.   Outpatient Medications Marked as Taking for the 5/10/22 encounter (Office Visit) with Sol Serrano M.D.   Medication Sig Dispense Refill   • cyclobenzaprine (FLEXERIL) 10 mg Tab Take 0.5-1 Tablets by mouth at bedtime as needed for Mild Pain, Moderate Pain or Muscle Spasms. 30 Tablet 5   • escitalopram (LEXAPRO) 20 MG tablet      • mirtazapine (REMERON) 15 MG Tab Take 15 mg by mouth at bedtime as needed.     • rosuvastatin (CRESTOR) 20 MG Tab Take 1 Tablet by mouth every evening. 30 Tablet 11   • meloxicam (MOBIC) 7.5 MG Tab Take 1-2 Tablets by mouth every day. 60 Tablet 1   • fluticasone (FLONASE) 50  MCG/ACT nasal spray fluticasone propionate 50 mcg/actuation nasal spray,suspension   Inhale 2 sprays every day by intranasal route.     • gabapentin (NEURONTIN) 300 MG Cap TAKE 1 CAPSULE BY MOUTH THREE TIMES DAILY FOLLOW TITRATION INSTRUCTIONS AS GIVEN     • vitamin D2, Ergocalciferol, (DRISDOL) 1.25 MG (77057 UT) Cap capsule Take 1 Capsule by mouth every 7 days. 12 Capsule 4        Current Outpatient Medications on File Prior to Visit   Medication Sig Dispense Refill   • escitalopram (LEXAPRO) 20 MG tablet      • mirtazapine (REMERON) 15 MG Tab Take 15 mg by mouth at bedtime as needed.     • rosuvastatin (CRESTOR) 20 MG Tab Take 1 Tablet by mouth every evening. 30 Tablet 11   • meloxicam (MOBIC) 7.5 MG Tab Take 1-2 Tablets by mouth every day. 60 Tablet 1   • fluticasone (FLONASE) 50 MCG/ACT nasal spray fluticasone propionate 50 mcg/actuation nasal spray,suspension   Inhale 2 sprays every day by intranasal route.     • gabapentin (NEURONTIN) 300 MG Cap TAKE 1 CAPSULE BY MOUTH THREE TIMES DAILY FOLLOW TITRATION INSTRUCTIONS AS GIVEN     • vitamin D2, Ergocalciferol, (DRISDOL) 1.25 MG (50322 UT) Cap capsule Take 1 Capsule by mouth every 7 days. 12 Capsule 4     No current facility-administered medications on file prior to visit.         EXAMINATION     Physical Exam:   /94 (BP Location: Right arm, Patient Position: Sitting, BP Cuff Size: Adult)   Pulse 69   Temp 36.4 °C (97.6 °F) (Temporal)   Ht 1.829 m (6')   Wt 100 kg (221 lb 1.9 oz)   SpO2 96%     Constitutional:   Body Habitus: Body mass index is 29.99 kg/m².  Cooperation: Fully cooperates with exam  Appearance: Well-groomed, well-nourished.    Eyes: No scleral icterus to suggest severe liver disease, no proptosis to suggest severe hyperthyroidism    ENT -no obvious auditory deficits, no noticeable facial droop     Skin -no rashes or lesions noted     Respiratory-  breathing comfortably on room air, no audible wheezing    Cardiovascular-distal  extremities warm and well perfused.  No lower extremity edema is noted.     Gastrointestinal - no obvious abdominal masses, non-distended    Psychiatric- alert and oriented ×3. Normal affect.     Gait - normal gait, no use of ambulatory device, nonantalgic.  Tandem gait and heel walking and toe walking intact.     Musculoskeletal and Neuro -         Thoracic/Lumbar Spine/Sacral Spine/Hips   Inspection: No evidence of atrophy in bilateral lower extremities throughout       Palpation:   Tenderness to palpation at right posterior lateral glute in right lumbar paraspinal muscle with palpable myofascial tension at this area.  No tenderness to palpation over the midline of lumbosacral spine, paraspinal muscles on the left, lumbar facets bilaterally, sacroiliac joints bilaterally, PSIS bilaterally and greater trochanters bilaterally.      Lumbar spine /hip provocative exam maneuvers  Straight leg raise positive on right, negative on left  FADIR test negative bilaterally     SI joint tests  SUZAN test on right reproduces pain radiating down his leg, negative on left.  Thigh thrust test negative bilaterally    Piriformis test positive on right     Key points for the international standards for neurological classification of spinal cord injury (ISNCSCI) to light touch.   Dermatome R L   L2 2 2   L3 2 2   L4 2 2   L5 2 2   S1 2 2   S2 2 2         Motor Exam Lower Extremities  ? Myotome R L   Hip flexion L2 5 5   Knee extension L3 5 5   Ankle dorsiflexion L4 5 5   Toe extension L5 5 5   Ankle plantarflexion S1 5 5      Previous exam     ROM: limited active range of motion with lumbar flexion, lateral flexion, and rotation bilaterally.   There is limited active range of motion with lumbar extension     Facet loading maneuver positive bilaterally      Cervical spine   Inspection: No deformities of the skin over the cervical spine. No rashes or lesions.     full active range of motion in all directions     Spurling's sign   negative bilaterally  Cervical facet loading maneuver  negative bilaterally     No signs of muscular atrophy in bilateral upper extremities      Key points for the international standards for neurological classification of spinal cord injury (ISNCSCI) to light touch.      Dermatome R L   C4 2 2   C5 2 2   C6 2 2   C7 2 2   C8 2 2   T1 2 2   T2 2 2         Motor Exam Upper Extremities   ? Myotome R L   Shoulder abduction C5 5 5   Elbow flexion C5 5 5   Wrist extension C6 5 5   Elbow extension C7 5 5   Finger flexion C8 5 5   Finger abduction T1 5 5      Bilateral hands:   Inspection: No swelling, deformities, or rashes. Symmetric appearing thenar and hyperthenar regions bilaterally.        Special tests:  Tinel's at the wrist over the median nerve negative bilaterally  Carpal tunnel compression: negative bilaterally  Phalen's test: negative bilaterally  Tinel's at the cubital tunnel: negative bilaterally       Reflexes  ?   R L   Patella   2+ 2+   Achilles    2+ 2+      Clonus of the ankle negative bilaterally         MEDICAL DECISION MAKING    Medical records review: see under HPI section.     DATA    Labs: Personally reviewed at today's visit:     Lab Results   Component Value Date/Time    SODIUM 143 04/12/2022 10:28 AM    POTASSIUM 4.6 04/12/2022 10:28 AM    CHLORIDE 108 04/12/2022 10:28 AM    CO2 26 04/12/2022 10:28 AM    ANION 9.0 04/12/2022 10:28 AM    GLUCOSE 113 (H) 04/12/2022 10:28 AM    BUN 9 04/12/2022 10:28 AM    CREATININE 0.97 04/12/2022 10:28 AM    CALCIUM 9.0 04/12/2022 10:28 AM    ASTSGOT 24 04/12/2022 10:28 AM    ALTSGPT 27 04/12/2022 10:28 AM    TBILIRUBIN 0.2 04/12/2022 10:28 AM    ALBUMIN 4.4 04/12/2022 10:28 AM    TOTPROTEIN 6.7 04/12/2022 10:28 AM    GLOBULIN 2.3 04/12/2022 10:28 AM    AGRATIO 1.9 04/12/2022 10:28 AM       Lab Results   Component Value Date/Time    PROTHROMBTM 12.8 10/30/2021 07:59 AM    INR 0.99 10/30/2021 07:59 AM        Lab Results   Component Value Date/Time    WBC 6.5  04/12/2022 10:28 AM    RBC 5.13 04/12/2022 10:28 AM    HEMOGLOBIN 16.1 04/12/2022 10:28 AM    HEMATOCRIT 46.8 04/12/2022 10:28 AM    MCV 91.2 04/12/2022 10:28 AM    MCH 31.4 04/12/2022 10:28 AM    MCHC 34.4 04/12/2022 10:28 AM    MPV 9.9 04/12/2022 10:28 AM    NEUTSPOLYS 64.70 10/30/2021 07:59 AM    LYMPHOCYTES 23.80 10/30/2021 07:59 AM    MONOCYTES 8.60 10/30/2021 07:59 AM    EOSINOPHILS 1.90 10/30/2021 07:59 AM    BASOPHILS 0.70 10/30/2021 07:59 AM        Lab Results   Component Value Date/Time    HBA1C 5.2 12/13/2019 02:10 PM        Imaging:   I personally reviewed following images, these are my reads  XR lumbar spine 4/12/2022  Very mild facet arthropathy at bilateral L5-S1.  Anterior body osteophytes at L3, L4, and L5.  Alignment intact.    MRI lumbar spine 5/5/2022  Very mild facet arthropathy at bilateral L3-4, L4-5, and L5-S1.  Alignment intact.  No significant central canal stenosis or neuroforaminal stenosis. See formal radiology report for further details.      IMAGING radiology reads. I reviewed the following radiology reads   Results for orders placed during the hospital encounter of 05/05/22    MR-LUMBAR SPINE-W/O  FINDINGS:     Lumbar spine alignment is within normal limits. Vertebral body heights are preserved. Intervertebral disc heights are normal. Bone marrow signal intensity is within normal limits.     Intervertebral disc spaces are within normal limits.     Lumbar spine levels:     T12-L1: Minimal facet degeneration.     L1-2: Minimal facet degeneration. No stenosis.     L2-3: Facet degeneration. No stenosis.     L3-4: Mild facet degeneration without stenosis.     L4-5: Mild bilateral facet degeneration. Minimal disc bulge. No stenosis.     L5-S1: Mild facet degeneration without stenosis.     Prevertebral soft tissues are within normal limits.     IMPRESSION:        Normal MRI lumbar spine.            Results for orders placed in visit on 04/12/22     DX-LUMBAR SPINE-2 OR 3  VIEWS     Impression  Mild degenerative disc disease and straightening of the normal lordosis                         Diagnosis  Visit Diagnoses     ICD-10-CM   1. Piriformis syndrome, right  G57.01   2. Chronic right-sided low back pain with right-sided sciatica  M54.41    G89.29         ASSESSMENT AND PLAN:  Robert Dahl (: 1980) is a male with 1 year history of low back pain that radiates down his right leg with exam today notable for positive right piriformis test and straight leg raise test on the right, reproducing his symptoms.     MRI negative for evidence of nerve impingement in lumbar spine, making neuraxial etiology of pain less likely.      Robert was seen today for follow-up.    Diagnoses and all orders for this visit:    Piriformis syndrome, right  -     Referral to Physical Therapy  -     Referral to Pain Clinic    Chronic right-sided low back pain with right-sided sciatica    Other orders  -     cyclobenzaprine (FLEXERIL) 10 mg Tab; Take 0.5-1 Tablets by mouth at bedtime as needed for Mild Pain, Moderate Pain or Muscle Spasms.          PLAN  Physical Therapy:  New physical therapy referral today for R piriformis syndrome     Diagnostic workup: Personally reviewed at today's visit: MRI lumbar spine 2022     Medications:   -Prescribed Flexeril 5-10 mg nightly today which patient states he was able to tolerate in the past for separate issue  - Advised patient to take gabapentin consistently every night which could result in improved pain relief compared to as needed dosing  -Agree with meloxicam for a short course    Interventions:  Right piriformis trigger point injection with steroid.  May also include right lumbar paraspinal muscle as this is also an area of pain. The risks, benefits, and alternatives to this procedure were discussed and the patient wishes to proceed with the procedure. Risks include but are not limited to damage to surrounding structures, infection, bleeding,  worsening of pain which can be permanent. Benefits include pain relief and improved function. Alternatives include not doing the procedure.      Follow-up: In 2 weeks for injection above    Orders Placed This Encounter   • Referral to Physical Therapy   • Referral to Pain Clinic   • cyclobenzaprine (FLEXERIL) 10 mg Tab       Sol Serrano MD  Interventional Pain and Spine  Physical Medicine and Rehabilitation  [facility]      The above note documents my personal evaluation of this patient. In addition, I have reviewed and confirmed with the patient and MA the supportive information documented in today's Patient Health Questionnaire and Office Note.     Please note that this dictation was created using voice recognition software. I have made every reasonable attempt to correct obvious errors, but I expect that there are errors of grammar and possibly content that I did not discover before finalizing the note.

## 2022-05-26 ENCOUNTER — APPOINTMENT (OUTPATIENT)
Dept: PHYSICAL MEDICINE AND REHAB | Facility: MEDICAL CENTER | Age: 42
End: 2022-05-26
Payer: COMMERCIAL

## 2022-05-26 ENCOUNTER — TELEPHONE (OUTPATIENT)
Dept: PHYSICAL MEDICINE AND REHAB | Facility: MEDICAL CENTER | Age: 42
End: 2022-05-26

## 2022-05-26 NOTE — TELEPHONE ENCOUNTER
Pt called saying an emergency came up and requested to reschedule his appointment today, 5/26. Pt has been moved to next Friday, 6/3.

## 2022-06-03 ENCOUNTER — OFFICE VISIT (OUTPATIENT)
Dept: PHYSICAL MEDICINE AND REHAB | Facility: MEDICAL CENTER | Age: 42
End: 2022-06-03
Payer: COMMERCIAL

## 2022-06-03 VITALS
DIASTOLIC BLOOD PRESSURE: 94 MMHG | HEIGHT: 72 IN | TEMPERATURE: 98 F | BODY MASS INDEX: 29.53 KG/M2 | SYSTOLIC BLOOD PRESSURE: 132 MMHG | WEIGHT: 218.03 LBS | HEART RATE: 72 BPM | OXYGEN SATURATION: 97 %

## 2022-06-03 DIAGNOSIS — G57.01 PIRIFORMIS SYNDROME, RIGHT: ICD-10-CM

## 2022-06-03 PROCEDURE — 76942 ECHO GUIDE FOR BIOPSY: CPT | Performed by: STUDENT IN AN ORGANIZED HEALTH CARE EDUCATION/TRAINING PROGRAM

## 2022-06-03 PROCEDURE — 20552 NJX 1/MLT TRIGGER POINT 1/2: CPT | Performed by: STUDENT IN AN ORGANIZED HEALTH CARE EDUCATION/TRAINING PROGRAM

## 2022-06-03 RX ORDER — DEXAMETHASONE SODIUM PHOSPHATE 4 MG/ML
4 INJECTION, SOLUTION INTRA-ARTICULAR; INTRALESIONAL; INTRAMUSCULAR; INTRAVENOUS; SOFT TISSUE ONCE
Status: COMPLETED | OUTPATIENT
Start: 2022-06-03 | End: 2022-06-03

## 2022-06-03 RX ADMIN — DEXAMETHASONE SODIUM PHOSPHATE 4 MG: 4 INJECTION, SOLUTION INTRA-ARTICULAR; INTRALESIONAL; INTRAMUSCULAR; INTRAVENOUS; SOFT TISSUE at 11:57

## 2022-06-03 ASSESSMENT — FIBROSIS 4 INDEX: FIB4 SCORE: 0.67

## 2022-06-03 ASSESSMENT — PAIN SCALES - GENERAL: PAINLEVEL: NO PAIN

## 2022-06-03 ASSESSMENT — PATIENT HEALTH QUESTIONNAIRE - PHQ9: CLINICAL INTERPRETATION OF PHQ2 SCORE: 0

## 2022-06-03 NOTE — PATIENT INSTRUCTIONS
[facility]  ANDREW CLANCY  [address]  [facility] [address]  Phone: [phone]  Fax: [phone]    Do govind hansen

## 2022-06-03 NOTE — PROCEDURES
Date of Service: 6/3/2022    Physician/s: Sol Serrano MD    Pre-operative Diagnosis: right  Piriformis syndrome    Post-operative Diagnosis: right  Piriformis syndrome    Procedure: right piriformis muscle trigger point injection    Description of procedure:    The risks, benefits, and alternatives of the procedure were reviewed and discussed with the patient.  Written informed consent was freely obtained. A pre-procedural time-out was conducted by the physician verifying patient’s identity, procedure to be performed, procedure site and side, and allergy verification. Appropriate equipment was determined to be in place for the procedure.     No sedation was used for this procedure.     In the office suite the patient was placed in a prone position and the skin was prepped and draped in the usual sterile fashion. An ultrasound probe was placed over the right buttocks to outline the sacrum in a line to the greater trochanter to identify the piriformis muscle. The sciatic nerve was also identified with the adjacent vasculature.  A 25g 3.5 inch needle was placed into skin and advanced under ultrasound guidance into the piriformis muscle and following negative aspiration, a solution containing 5 mL of 1% lidocaine and 1 mL of 4 mg/mL of dexamethasone  was  injected.  The needle was removed intact.  The patient's buttocks was wiped with a 4x4 gauze, the area was cleansed with alcohol prep, and a bandaid was applied. There were no complications noted.       Sol Serrano MD  Interventional Pain and Spine  Physical Medicine and Rehabilitation  [facility]

## 2022-07-12 ENCOUNTER — APPOINTMENT (OUTPATIENT)
Dept: PHYSICAL MEDICINE AND REHAB | Facility: MEDICAL CENTER | Age: 42
End: 2022-07-12
Payer: COMMERCIAL

## 2022-08-24 ENCOUNTER — TELEMEDICINE (OUTPATIENT)
Dept: MEDICAL GROUP | Facility: PHYSICIAN GROUP | Age: 42
End: 2022-08-24

## 2022-08-24 DIAGNOSIS — F31.9 BIPOLAR DISEASE, CHRONIC (HCC): ICD-10-CM

## 2022-08-24 PROCEDURE — 99213 OFFICE O/P EST LOW 20 MIN: CPT | Mod: 95 | Performed by: NURSE PRACTITIONER

## 2022-08-24 RX ORDER — LAMOTRIGINE 25 MG/1
75 TABLET ORAL DAILY
Qty: 90 TABLET | Refills: 1 | Status: SHIPPED | OUTPATIENT
Start: 2022-08-24 | End: 2022-09-07 | Stop reason: SDUPTHER

## 2022-08-24 RX ORDER — LAMOTRIGINE 25 MG/1
TABLET ORAL
COMMUNITY
Start: 2022-08-11 | End: 2022-08-24 | Stop reason: SDUPTHER

## 2022-08-24 RX ORDER — ESCITALOPRAM OXALATE 20 MG/1
20 TABLET ORAL DAILY
Qty: 90 TABLET | Refills: 1 | Status: SHIPPED | OUTPATIENT
Start: 2022-08-24 | End: 2022-09-07 | Stop reason: SDUPTHER

## 2022-08-24 NOTE — ASSESSMENT & PLAN NOTE
Chronic and stable.  Patient was seen at [facility] online services.  He was diagnosed with bipolar prescribed lamotrigine and Lexapro.  Since starting on lamotrigine denies depression.  Doing well.  He still struggling with sleep.  Requesting to increase the dose of lamotrigine from current dose 50 mg to 75.  He denies SI.

## 2022-09-07 ENCOUNTER — TELEMEDICINE (OUTPATIENT)
Dept: MEDICAL GROUP | Facility: PHYSICIAN GROUP | Age: 42
End: 2022-09-07

## 2022-09-07 DIAGNOSIS — E78.1 HIGH TRIGLYCERIDES: ICD-10-CM

## 2022-09-07 DIAGNOSIS — G47.00 INSOMNIA, UNSPECIFIED TYPE: ICD-10-CM

## 2022-09-07 DIAGNOSIS — F41.9 ANXIETY AND DEPRESSION: ICD-10-CM

## 2022-09-07 DIAGNOSIS — F31.9 BIPOLAR DISEASE, CHRONIC (HCC): ICD-10-CM

## 2022-09-07 DIAGNOSIS — F32.A ANXIETY AND DEPRESSION: ICD-10-CM

## 2022-09-07 PROCEDURE — 99214 OFFICE O/P EST MOD 30 MIN: CPT | Mod: 95 | Performed by: NURSE PRACTITIONER

## 2022-09-07 RX ORDER — ESCITALOPRAM OXALATE 20 MG/1
20 TABLET ORAL DAILY
Qty: 90 TABLET | Refills: 1 | Status: SHIPPED | OUTPATIENT
Start: 2022-09-07 | End: 2023-06-02 | Stop reason: SDUPTHER

## 2022-09-07 RX ORDER — MIRTAZAPINE 15 MG/1
15 TABLET, FILM COATED ORAL
Qty: 90 TABLET | Refills: 1 | Status: SHIPPED | OUTPATIENT
Start: 2022-09-07

## 2022-09-07 RX ORDER — LAMOTRIGINE 25 MG/1
75 TABLET ORAL DAILY
Qty: 270 TABLET | Refills: 1 | Status: SHIPPED | OUTPATIENT
Start: 2022-09-07 | End: 2023-06-02 | Stop reason: SDUPTHER

## 2022-09-07 NOTE — ASSESSMENT & PLAN NOTE
Cholesterol results from 4/12/2022 with triglycerides at 326.  He is on Crestor 20 mg taking every evening.

## 2022-09-07 NOTE — PROGRESS NOTES
Virtual Visit: Established Patient   This visit was conducted via Zoom using secure and encrypted videoconferencing technology.   The patient was in their home in the [facility].    The patient's identity was confirmed and verbal consent was obtained for this virtual visit.     Subjective:   CC:   Chief Complaint   Patient presents with    Follow-Up     On medication        Robert Dahl is a 42 y.o. male presenting for evaluation and management of:    ROS     Current medicines (including changes today)  Current Outpatient Medications   Medication Sig Dispense Refill    lamoTRIgine (LAMICTAL) 25 MG Tab Take 3 Tablets by mouth every day. 270 Tablet 1    escitalopram (LEXAPRO) 20 MG tablet Take 1 Tablet by mouth every day. 90 Tablet 1    mirtazapine (REMERON) 15 MG Tab Take 1 Tablet by mouth at bedtime as needed (insomnia). 90 Tablet 1    cyclobenzaprine (FLEXERIL) 10 mg Tab Take 0.5-1 Tablets by mouth at bedtime as needed for Mild Pain, Moderate Pain or Muscle Spasms. 30 Tablet 5    rosuvastatin (CRESTOR) 20 MG Tab Take 1 Tablet by mouth every evening. 30 Tablet 11    meloxicam (MOBIC) 7.5 MG Tab Take 1-2 Tablets by mouth every day. 60 Tablet 1    fluticasone (FLONASE) 50 MCG/ACT nasal spray fluticasone propionate 50 mcg/actuation nasal spray,suspension   Inhale 2 sprays every day by intranasal route.      gabapentin (NEURONTIN) 300 MG Cap TAKE 1 CAPSULE BY MOUTH THREE TIMES DAILY FOLLOW TITRATION INSTRUCTIONS AS GIVEN      vitamin D2, Ergocalciferol, (DRISDOL) 1.25 MG (61101 UT) Cap capsule Take 1 Capsule by mouth every 7 days. 12 Capsule 4     No current facility-administered medications for this visit.       Patient Active Problem List    Diagnosis Date Noted    Bipolar disease, chronic (HCC) 08/24/2022    Polyarthralgia 04/05/2022    Right sided sciatica 04/05/2022    Abscess of groin 02/03/2022    Bronchitis 02/03/2022    Duodenitis 02/03/2022    Hematemesis 02/03/2022    Lumbar sprain 02/03/2022     Nasal congestion 02/03/2022    Peptic ulcer 02/03/2022    Upper respiratory infection 02/03/2022    Urinary outflow obstruction 02/03/2022    Weight gain 02/03/2022    Mixed anxiety and depressive disorder 02/03/2022    Flu-like symptoms 02/03/2022    Postoperative pain 05/25/2021    Hospital discharge follow-up 05/06/2021    Pain in left leg 05/06/2021    Abdominal wall pain in right flank 01/05/2021    Elevated fasting blood sugar 01/05/2021    High triglycerides 12/20/2019    Vitamin D deficiency 12/20/2019    DVT (deep venous thrombosis) (HCC) 12/06/2019    Insomnia 12/06/2019    Anxiety and depression 12/06/2019    Annual physical exam 12/06/2019    Multiple lung nodules on CT 12/06/2019        Objective:   There were no vitals taken for this visit.    Physical Exam:  Constitutional: Alert, no distress, well-groomed.  Skin: No rashes in visible areas.  Eye: Round. Conjunctiva clear, lids normal. No icterus.   ENMT: Lips pink without lesions, good dentition, moist mucous membranes. Phonation normal.  Neck: No masses, no thyromegaly. Moves freely without pain.  Respiratory: Unlabored respiratory effort, no cough or audible wheeze  Psych: Alert and oriented x3, normal affect and mood.     Assessment and Plan:   The following treatment plan was discussed:   1. Bipolar disease, chronic (HCC)  Stable on current regimen.  Continue.  Refills given   Will f/u in 3 mos or earlier PRN  - lamoTRIgine (LAMICTAL) 25 MG Tab; Take 3 Tablets by mouth every day.  Dispense: 270 Tablet; Refill: 1  - escitalopram (LEXAPRO) 20 MG tablet; Take 1 Tablet by mouth every day.  Dispense: 90 Tablet; Refill: 1    2. Insomnia, unspecified type  Stable on current regimen.  Continue.  Refills given   Patient was requesting to increase mirtazapine as he would like to sleep longer than 5 AM.  Patient is getting sufficient sleep at least 7 to 8 hours daily as he is going to bed at 0-hour at 8 or 9 PM.  No need to increase medicine to avoid  potential side effect of drowsiness or medication interaction.  Will remain on the same dose.  This was verified with pharmacist on staff who agrees with plan.  - mirtazapine (REMERON) 15 MG Tab; Take 1 Tablet by mouth at bedtime as needed (insomnia).  Dispense: 90 Tablet; Refill: 1    3. Anxiety and depression  Stable on current regimen.  Continue.  Refills given   - escitalopram (LEXAPRO) 20 MG tablet; Take 1 Tablet by mouth every day.  Dispense: 90 Tablet; Refill: 1    4. High triglycerides  Will obtain labs and discuss findings at the next appointment.  - Lipid Profile; Future       Follow-up: No follow-ups on file. 3 mos

## 2022-09-07 NOTE — ASSESSMENT & PLAN NOTE
Chronic and stable on mirtazapine 50 mg.  Patient goes to bed at 8 or 9 and sleeps until 5 AM.  Denies caffeine or energy drink use.

## 2022-09-07 NOTE — ASSESSMENT & PLAN NOTE
Chronic and stable on lamotrigine 75 mg.  Denies any fluctuations in mood, no depression.  Would like refill today.

## 2023-05-09 ENCOUNTER — APPOINTMENT (OUTPATIENT)
Dept: PHYSICAL MEDICINE AND REHAB | Facility: MEDICAL CENTER | Age: 43
End: 2023-05-09

## 2023-06-08 ENCOUNTER — APPOINTMENT (OUTPATIENT)
Dept: MEDICAL GROUP | Facility: MEDICAL CENTER | Age: 43
End: 2023-06-08

## 2024-04-25 ENCOUNTER — APPOINTMENT (OUTPATIENT)
Dept: RADIOLOGY | Facility: MEDICAL CENTER | Age: 44
End: 2024-04-25
Attending: EMERGENCY MEDICINE
Payer: MEDICAID

## 2024-04-25 ENCOUNTER — HOSPITAL ENCOUNTER (EMERGENCY)
Facility: MEDICAL CENTER | Age: 44
End: 2024-04-25
Attending: EMERGENCY MEDICINE
Payer: MEDICAID

## 2024-04-25 VITALS
SYSTOLIC BLOOD PRESSURE: 164 MMHG | DIASTOLIC BLOOD PRESSURE: 91 MMHG | HEIGHT: 72 IN | BODY MASS INDEX: 26.31 KG/M2 | HEART RATE: 72 BPM | OXYGEN SATURATION: 96 % | RESPIRATION RATE: 16 BRPM | TEMPERATURE: 97.8 F | WEIGHT: 194.22 LBS

## 2024-04-25 DIAGNOSIS — R05.2 SUBACUTE COUGH: ICD-10-CM

## 2024-04-25 DIAGNOSIS — R91.1 PULMONARY NODULE: ICD-10-CM

## 2024-04-25 DIAGNOSIS — R07.89 OTHER CHEST PAIN: ICD-10-CM

## 2024-04-25 DIAGNOSIS — J98.01 BRONCHOSPASM: ICD-10-CM

## 2024-04-25 LAB
ALBUMIN SERPL BCP-MCNC: 4.3 G/DL (ref 3.2–4.9)
ALBUMIN/GLOB SERPL: 1.7 G/DL
ALP SERPL-CCNC: 92 U/L (ref 30–99)
ALT SERPL-CCNC: 19 U/L (ref 2–50)
ANION GAP SERPL CALC-SCNC: 10 MMOL/L (ref 7–16)
AST SERPL-CCNC: 16 U/L (ref 12–45)
BASOPHILS # BLD AUTO: 0.7 % (ref 0–1.8)
BASOPHILS # BLD: 0.05 K/UL (ref 0–0.12)
BILIRUB SERPL-MCNC: 0.3 MG/DL (ref 0.1–1.5)
BUN SERPL-MCNC: 6 MG/DL (ref 8–22)
CALCIUM ALBUM COR SERPL-MCNC: 9.3 MG/DL (ref 8.5–10.5)
CALCIUM SERPL-MCNC: 9.5 MG/DL (ref 8.5–10.5)
CHLORIDE SERPL-SCNC: 106 MMOL/L (ref 96–112)
CO2 SERPL-SCNC: 25 MMOL/L (ref 20–33)
CREAT SERPL-MCNC: 0.96 MG/DL (ref 0.5–1.4)
CRP SERPL HS-MCNC: <0.3 MG/DL (ref 0–0.75)
EKG IMPRESSION: NORMAL
EOSINOPHIL # BLD AUTO: 0.21 K/UL (ref 0–0.51)
EOSINOPHIL NFR BLD: 2.8 % (ref 0–6.9)
ERYTHROCYTE [DISTWIDTH] IN BLOOD BY AUTOMATED COUNT: 38.9 FL (ref 35.9–50)
GFR SERPLBLD CREATININE-BSD FMLA CKD-EPI: 101 ML/MIN/1.73 M 2
GLOBULIN SER CALC-MCNC: 2.5 G/DL (ref 1.9–3.5)
GLUCOSE SERPL-MCNC: 102 MG/DL (ref 65–99)
HCT VFR BLD AUTO: 47.8 % (ref 42–52)
HGB BLD-MCNC: 17 G/DL (ref 14–18)
IMM GRANULOCYTES # BLD AUTO: 0.02 K/UL (ref 0–0.11)
IMM GRANULOCYTES NFR BLD AUTO: 0.3 % (ref 0–0.9)
LYMPHOCYTES # BLD AUTO: 2.49 K/UL (ref 1–4.8)
LYMPHOCYTES NFR BLD: 33.1 % (ref 22–41)
MCH RBC QN AUTO: 31.8 PG (ref 27–33)
MCHC RBC AUTO-ENTMCNC: 35.6 G/DL (ref 32.3–36.5)
MCV RBC AUTO: 89.5 FL (ref 81.4–97.8)
MONOCYTES # BLD AUTO: 0.61 K/UL (ref 0–0.85)
MONOCYTES NFR BLD AUTO: 8.1 % (ref 0–13.4)
NEUTROPHILS # BLD AUTO: 4.14 K/UL (ref 1.82–7.42)
NEUTROPHILS NFR BLD: 55 % (ref 44–72)
NRBC # BLD AUTO: 0 K/UL
NRBC BLD-RTO: 0 /100 WBC (ref 0–0.2)
NT-PROBNP SERPL IA-MCNC: <36 PG/ML (ref 0–125)
PLATELET # BLD AUTO: 284 K/UL (ref 164–446)
PMV BLD AUTO: 8.7 FL (ref 9–12.9)
POTASSIUM SERPL-SCNC: 4.3 MMOL/L (ref 3.6–5.5)
PROT SERPL-MCNC: 6.8 G/DL (ref 6–8.2)
RBC # BLD AUTO: 5.34 M/UL (ref 4.7–6.1)
SODIUM SERPL-SCNC: 141 MMOL/L (ref 135–145)
TROPONIN T SERPL-MCNC: <6 NG/L (ref 6–19)
TROPONIN T SERPL-MCNC: <6 NG/L (ref 6–19)
WBC # BLD AUTO: 7.5 K/UL (ref 4.8–10.8)

## 2024-04-25 PROCEDURE — 36415 COLL VENOUS BLD VENIPUNCTURE: CPT

## 2024-04-25 PROCEDURE — 83880 ASSAY OF NATRIURETIC PEPTIDE: CPT

## 2024-04-25 PROCEDURE — 99285 EMERGENCY DEPT VISIT HI MDM: CPT

## 2024-04-25 PROCEDURE — 93005 ELECTROCARDIOGRAM TRACING: CPT

## 2024-04-25 PROCEDURE — 84484 ASSAY OF TROPONIN QUANT: CPT

## 2024-04-25 PROCEDURE — 700105 HCHG RX REV CODE 258: Performed by: EMERGENCY MEDICINE

## 2024-04-25 PROCEDURE — 71275 CT ANGIOGRAPHY CHEST: CPT

## 2024-04-25 PROCEDURE — 80053 COMPREHEN METABOLIC PANEL: CPT

## 2024-04-25 PROCEDURE — 700111 HCHG RX REV CODE 636 W/ 250 OVERRIDE (IP): Performed by: EMERGENCY MEDICINE

## 2024-04-25 PROCEDURE — 700117 HCHG RX CONTRAST REV CODE 255: Performed by: EMERGENCY MEDICINE

## 2024-04-25 PROCEDURE — 700101 HCHG RX REV CODE 250: Performed by: EMERGENCY MEDICINE

## 2024-04-25 PROCEDURE — 94640 AIRWAY INHALATION TREATMENT: CPT

## 2024-04-25 PROCEDURE — 85025 COMPLETE CBC W/AUTO DIFF WBC: CPT

## 2024-04-25 PROCEDURE — 86140 C-REACTIVE PROTEIN: CPT

## 2024-04-25 PROCEDURE — 93005 ELECTROCARDIOGRAM TRACING: CPT | Performed by: EMERGENCY MEDICINE

## 2024-04-25 PROCEDURE — 71045 X-RAY EXAM CHEST 1 VIEW: CPT

## 2024-04-25 RX ORDER — SODIUM CHLORIDE 9 MG/ML
1000 INJECTION, SOLUTION INTRAVENOUS ONCE
Status: COMPLETED | OUTPATIENT
Start: 2024-04-25 | End: 2024-04-25

## 2024-04-25 RX ORDER — PREDNISONE 20 MG/1
60 TABLET ORAL DAILY
Qty: 12 TABLET | Refills: 0 | Status: SHIPPED | OUTPATIENT
Start: 2024-04-25 | End: 2024-04-29

## 2024-04-25 RX ORDER — PREDNISONE 20 MG/1
60 TABLET ORAL ONCE
Status: COMPLETED | OUTPATIENT
Start: 2024-04-25 | End: 2024-04-25

## 2024-04-25 RX ORDER — ALBUTEROL SULFATE 90 UG/1
2 AEROSOL, METERED RESPIRATORY (INHALATION) EVERY 6 HOURS PRN
Qty: 8.5 G | Refills: 0 | Status: SHIPPED | OUTPATIENT
Start: 2024-04-25

## 2024-04-25 RX ADMIN — ALBUTEROL SULFATE 2.5 MG: 2.5 SOLUTION RESPIRATORY (INHALATION) at 14:55

## 2024-04-25 RX ADMIN — SODIUM CHLORIDE 1000 ML: 9 INJECTION, SOLUTION INTRAVENOUS at 14:36

## 2024-04-25 RX ADMIN — PREDNISONE 60 MG: 20 TABLET ORAL at 14:28

## 2024-04-25 RX ADMIN — IOHEXOL 93 ML: 350 INJECTION, SOLUTION INTRAVENOUS at 12:45

## 2024-04-25 ASSESSMENT — LIFESTYLE VARIABLES: EVER_SMOKED: YES

## 2024-04-25 ASSESSMENT — HEART SCORE
AGE: <45
HISTORY: MODERATELY SUSPICIOUS
AGE: <45
HEART SCORE: 2
RISK FACTORS: 1-2 RISK FACTORS
RISK FACTORS: 1-2 RISK FACTORS
HEART SCORE: 2
ECG: NORMAL
TROPONIN: LESS THAN OR EQUAL TO NORMAL LIMIT
HISTORY: MODERATELY SUSPICIOUS
TROPONIN: LESS THAN OR EQUAL TO NORMAL LIMIT
ECG: NORMAL

## 2024-04-25 ASSESSMENT — COPD QUESTIONNAIRES
HAVE YOU SMOKED AT LEAST 100 CIGARETTES IN YOUR ENTIRE LIFE: YES
DO YOU EVER COUGH UP ANY MUCUS OR PHLEGM?: NO/ONLY WITH OCCASIONAL COLDS OR INFECTIONS
DURING THE PAST 4 WEEKS HOW MUCH DID YOU FEEL SHORT OF BREATH: NONE/LITTLE OF THE TIME
COPD SCREENING SCORE: 2

## 2024-04-25 NOTE — ED NOTES
Discharge instructions given to pt including follow up with pcp and cardiologist or returning if no improvement of symptoms or to return if worse. Prescription x 2 provided to pt. Electronically sent over to pt's pharmacy. Questions answered by RN. Denies any new complaints. Discharged w/stable vitals and able to ambulate to the lobby with steady gait.

## 2024-04-25 NOTE — ED PROVIDER NOTES
ED Provider Note    CHIEF COMPLAINT  Chief Complaint   Patient presents with    Cough    Shortness of Breath    Chest Pain     Pt states sx for 2 months.  Pt states he becomes SOB with ambulation       EXTERNAL RECORDS REVIEWED  Reviewed previous ED visits and baseline labs for comparison.    HPI/ROS  LIMITATION TO HISTORY   Select: : None  OUTSIDE HISTORIAN(S):  None    Robert Dahl is a 44 y.o. male who presents to the emergency department complaining of multiple symptoms.  Last 2 months is an intermittent achiness and intermittent fevers.  Achiness is described as diffuse bodyaches and joint pain.  Seems to come and go.  Is improved but not resolved when he takes ibuprofen.  We discussed with intermittent fevers as high as 105 Fahrenheit per his history.  The patient is a cough for a period of time.  He states his intermittent wheezing as well as exertional shortness of breath and exertional chest pain.  The symptoms are worsening.  He does have a history of venous thromboembolic disease.  Currently not anticoagulated.  Denies any known history of asthma or lung disease.  Does have a family history of heart disease but no known heart disease himself.  Denies any other aggravating leaving factors or associated complaints.    PAST MEDICAL HISTORY   has a past medical history of DVT (deep venous thrombosis) (HCC) (2020), Heart burn, High cholesterol, Hyperlipidemia, Indigestion, Jaundice (05/24/2021), Psychiatric problem, and Renal disorder.    SURGICAL HISTORY   has a past surgical history that includes other and socorro by laparoscopy (5/25/2021).    FAMILY HISTORY  Family History   Problem Relation Age of Onset    Heart Disease Mother     Hypertension Mother     Heart Disease Father     Diabetes Father        SOCIAL HISTORY  Social History     Tobacco Use    Smoking status: Former     Current packs/day: 0.00     Average packs/day: 0.5 packs/day for 5.0 years (2.5 ttl pk-yrs)     Types: Cigarettes     Start  date: 2009     Quit date: 2014     Years since quittin.3    Smokeless tobacco: Never    Tobacco comments:     quit 2014   Vaping Use    Vaping Use: Every day    Substances: THC, q hs    Devices: Disposable   Substance and Sexual Activity    Alcohol use: Not Currently    Drug use: Yes     Frequency: 7.0 times per week     Types: Marijuana, Inhaled     Comment: CBD, inhaler    Sexual activity: Yes     Partners: Female       CURRENT MEDICATIONS  Home Medications    **Home medications have not yet been reviewed for this encounter**         ALLERGIES  No Known Allergies    PHYSICAL EXAM  VITAL SIGNS: BP (!) 149/98   Pulse (!) 53   Temp 36.6 °C (97.8 °F) (Temporal)   Resp 19   Ht 1.829 m (6')   Wt 88.1 kg (194 lb 3.6 oz)   SpO2 97%   BMI 26.34 kg/m²    Constitutional: Well developed, Well nourished, No acute distress, Non-toxic appearance.   HENT: Normocephalic, Atraumatic, Bilateral external ears normal, Oropharynx moist,   Eyes: PERRL, EOMI, Conjunctiva normal, No discharge.   Neck: Normal range of motion, No tenderness, Supple, No stridor.   Cardiovascular: Normal heart rate, Normal rhythm, No murmurs, No rubs, No gallops.   Thorax & Lungs: Normal breath sounds, No respiratory distress, No wheezing  Abdomen: Soft, No tenderness  Skin: Warm, Dry, No erythema, No rash.   Back: No tenderness, No CVA tenderness.     Musculoskeletal: Good range of motion in all major joints.  Neurologic: Alert,No focal deficits noted.       EKG/LABS    Results for orders placed or performed during the hospital encounter of 24   CBC with Differential   Result Value Ref Range    WBC 7.5 4.8 - 10.8 K/uL    RBC 5.34 4.70 - 6.10 M/uL    Hemoglobin 17.0 14.0 - 18.0 g/dL    Hematocrit 47.8 42.0 - 52.0 %    MCV 89.5 81.4 - 97.8 fL    MCH 31.8 27.0 - 33.0 pg    MCHC 35.6 32.3 - 36.5 g/dL    RDW 38.9 35.9 - 50.0 fL    Platelet Count 284 164 - 446 K/uL    MPV 8.7 (L) 9.0 - 12.9 fL    Neutrophils-Polys 55.00 44.00 - 72.00 %     Lymphocytes 33.10 22.00 - 41.00 %    Monocytes 8.10 0.00 - 13.40 %    Eosinophils 2.80 0.00 - 6.90 %    Basophils 0.70 0.00 - 1.80 %    Immature Granulocytes 0.30 0.00 - 0.90 %    Nucleated RBC 0.00 0.00 - 0.20 /100 WBC    Neutrophils (Absolute) 4.14 1.82 - 7.42 K/uL    Lymphs (Absolute) 2.49 1.00 - 4.80 K/uL    Monos (Absolute) 0.61 0.00 - 0.85 K/uL    Eos (Absolute) 0.21 0.00 - 0.51 K/uL    Baso (Absolute) 0.05 0.00 - 0.12 K/uL    Immature Granulocytes (abs) 0.02 0.00 - 0.11 K/uL    NRBC (Absolute) 0.00 K/uL   Comp Metabolic Panel   Result Value Ref Range    Sodium 141 135 - 145 mmol/L    Potassium 4.3 3.6 - 5.5 mmol/L    Chloride 106 96 - 112 mmol/L    Co2 25 20 - 33 mmol/L    Anion Gap 10.0 7.0 - 16.0    Glucose 102 (H) 65 - 99 mg/dL    Bun 6 (L) 8 - 22 mg/dL    Creatinine 0.96 0.50 - 1.40 mg/dL    Calcium 9.5 8.5 - 10.5 mg/dL    Correct Calcium 9.3 8.5 - 10.5 mg/dL    AST(SGOT) 16 12 - 45 U/L    ALT(SGPT) 19 2 - 50 U/L    Alkaline Phosphatase 92 30 - 99 U/L    Total Bilirubin 0.3 0.1 - 1.5 mg/dL    Albumin 4.3 3.2 - 4.9 g/dL    Total Protein 6.8 6.0 - 8.2 g/dL    Globulin 2.5 1.9 - 3.5 g/dL    A-G Ratio 1.7 g/dL   ESTIMATED GFR   Result Value Ref Range    GFR (CKD-EPI) 101 >60 mL/min/1.73 m 2   TROPONIN   Result Value Ref Range    Troponin T <6 6 - 19 ng/L   proBrain Natriuretic Peptide, NT   Result Value Ref Range    NT-proBNP <36 0 - 125 pg/mL   CRP QUANTITIVE (NON-CARDIAC)   Result Value Ref Range    Stat C-Reactive Protein <0.30 0.00 - 0.75 mg/dL   TROPONIN   Result Value Ref Range    Troponin T <6 6 - 19 ng/L   EKG   Result Value Ref Range    Report       [facility] Emergency Dept.    Test Date:  2024  Pt Name:    KAYLIN BUTLER              Department: ER  MRN:        [id number]                      Room:  Gender:     Male                         Technician: [id number]  :        1980                   Requested By:ER TRIAGE PROTOCOL  Order #:    [id number]                     Reading MD: ANUSHKA STEVENS AMD    Measurements  Intervals                                Axis  Rate:       50                           P:          72  MD:         199                          QRS:        48  QRSD:       86                           T:          52  QT:         407  QTc:        372    Interpretive Statements  Sinus bradycardia  Compared to ECG 05/10/2021 16:13:05  Sinus rhythm no longer present  Electronically Signed On 04- 11:47:26 PDT by ANUSHKA CORDOVA. AMD        I have independently interpreted this EKG    RADIOLOGY/PROCEDURES   I have independently interpreted the diagnostic imaging associated with this visit and am waiting the final reading from the radiologist.   My preliminary interpretation is as follows: I have reviewed the images and agree with radiology interpretation.    Radiologist interpretation:  CT-CTA CHEST PULMONARY ARTERY W/ RECONS   Final Result         1.  No evidence of pulmonary embolism or definitely acute abnormality.   2.  7 mm noncalcified left lower lobe pulmonary nodule.      Fleischner Society pulmonary nodule recommendations:   Low Risk: CT at 6-12 months, then consider CT at 18-24 months      High Risk: CT at 6-12 months, then CT at 18-24 months      Low Risk - Minimal or absent history of smoking and of other known risk factors.      High Risk - History of smoking or of other known risk factors.      Note: These recommendations do not apply to lung cancer screening, patients with immunosuppression, or patients with known primary cancer.      Fleischner Society 2017 Guidelines for Management of Incidentally Detected Pulmonary Nodules in Adults      DX-CHEST-PORTABLE (1 VIEW)   Final Result      No acute cardiopulmonary abnormality.          COURSE & MEDICAL DECISION MAKING    ASSESSMENT, COURSE AND PLAN  Care Narrative:     44-year-old male presents the emergency department with multiple complaints including but not limited to exertional chest tightness and  shortness of breath, dizziness with exertion, intermittent fever and joint achiness.  The symptoms been progressive but acutely worsened recently.    The patient was seen and examined a broad dorsal diagnosis was considered including but not limited to PE, pneumonia, pneumothorax, bronchospasm, coronary artery disease structural heart disease, anemia, renal failure, chronic rheumatologic disease.    The patient has a history of previous venous thromboembolic disease therefore he needs a CT PE to rule out a PE.  This is performed and is unremarkable.    Do not see any coronary artery calcifications on the CT.  His first EKG is unremarkable and his first troponin is nondetectable.  His heart score is 2.  The patient does have some exertional chest pain but it is really related to bronchospasm and put in a outpatient cardiac referral for him.  He is agreeable with this plan.      The patient reports wheezing.  He does have a slightly prolonged expiratory phase during forced exhalation will give him some prednisone a breathing treatment and see if this helps improve his symptoms.  Will get a second troponin.  Patient complains of dizziness which she describes lightheadedness will get orthostatics and give him some fluid.        Patient is reassessed after steroids and breathing treatment feels completely better.  His lungs sound better his exhalation does not produce any wheezes and he does not have such a prolonged expiratory phase.  I do think at least a component of this is bronchospasm.    PE study is negative for pulmonary embolism.    So far his EKG 2 troponins are unremarkable and his heart score is 2 he can be worked up as an outpatient.    Does have an incidental pulmonary nodule noted he will be made aware of this and asked to follow-up with his doctor.    He is comfortable with the plan.  He is prescribed prednisone and albuterol.  He is counseled to see his doctor for follow-up and follow-up with cardiology  return for syn chest pain or other concerns.  Questions were answered is agreeable plan and discharged in good condition.    Marielle Kemp, SHANTI.P.R.N.  [id number]  [address]  [facility] [id number] [phone]                DISPOSITION AND DISCUSSIONS    Escalation of care considered, and ultimately not performed:acute inpatient care management, however at this time, the patient is most appropriate for outpatient management      FINAL DIAGNOSIS  1. Subacute cough    2. Bronchospasm    3. Other chest pain    4. Pulmonary nodule           Electronically signed by: Rob Lin M.D., 4/25/2024 12:05 PM

## 2024-04-25 NOTE — ED TRIAGE NOTES
Chief Complaint   Patient presents with    Cough    Shortness of Breath    Chest Pain     Pt states sx for 2 months.  Pt states he becomes SOB with ambulation

## 2024-04-25 NOTE — ED NOTES
Up ambulated in the hallway independently with steady gait. Denies any dizziness/sob. States that he is feeling better.

## 2024-04-25 NOTE — DISCHARGE INSTRUCTIONS
Prednisone as prescribed.  Use inhaler as prescribed.    Have your blood pressure rechecked within a week.  It was elevated.  Follow-up with your doctor for this.  I have put in a cardiology referral for outpatient cardiac evaluation.  Return to the ER in the interim for worsening chest pain, shortness of breath or other concerns.    Medicines as prescribed.  You also have a pulmonary nodule.  See your doctor this requires repeat imaging in 6 months.

## 2024-05-10 ENCOUNTER — TELEPHONE (OUTPATIENT)
Dept: HEALTH INFORMATION MANAGEMENT | Facility: OTHER | Age: 44
End: 2024-05-10
Payer: MEDICAID

## 2024-06-04 ENCOUNTER — APPOINTMENT (OUTPATIENT)
Dept: MEDICAL GROUP | Facility: MEDICAL CENTER | Age: 44
End: 2024-06-04
Payer: MEDICAID

## 2024-06-05 ENCOUNTER — TELEMEDICINE (OUTPATIENT)
Dept: MEDICAL GROUP | Facility: MEDICAL CENTER | Age: 44
End: 2024-06-05
Payer: MEDICAID

## 2024-06-05 DIAGNOSIS — Z13.0 SCREENING FOR ENDOCRINE, NUTRITIONAL, METABOLIC AND IMMUNITY DISORDER: ICD-10-CM

## 2024-06-05 DIAGNOSIS — Z13.21 SCREENING FOR ENDOCRINE, NUTRITIONAL, METABOLIC AND IMMUNITY DISORDER: ICD-10-CM

## 2024-06-05 DIAGNOSIS — F31.9 BIPOLAR 1 DISORDER (HCC): ICD-10-CM

## 2024-06-05 DIAGNOSIS — M25.50 POLYARTHRALGIA: ICD-10-CM

## 2024-06-05 DIAGNOSIS — M25.50 ARTHRALGIA, UNSPECIFIED JOINT: ICD-10-CM

## 2024-06-05 DIAGNOSIS — Z13.29 SCREENING FOR ENDOCRINE, NUTRITIONAL, METABOLIC AND IMMUNITY DISORDER: ICD-10-CM

## 2024-06-05 DIAGNOSIS — Z13.228 SCREENING FOR ENDOCRINE, NUTRITIONAL, METABOLIC AND IMMUNITY DISORDER: ICD-10-CM

## 2024-06-05 PROBLEM — J40 BRONCHITIS: Status: RESOLVED | Noted: 2022-02-03 | Resolved: 2024-06-05

## 2024-06-05 PROBLEM — R68.89 FLU-LIKE SYMPTOMS: Status: RESOLVED | Noted: 2022-02-03 | Resolved: 2024-06-05

## 2024-06-05 PROBLEM — R09.81 NASAL CONGESTION: Status: RESOLVED | Noted: 2022-02-03 | Resolved: 2024-06-05

## 2024-06-05 PROBLEM — R63.5 WEIGHT GAIN: Status: RESOLVED | Noted: 2022-02-03 | Resolved: 2024-06-05

## 2024-06-05 PROBLEM — G89.18 POSTOPERATIVE PAIN: Status: RESOLVED | Noted: 2021-05-25 | Resolved: 2024-06-05

## 2024-06-05 PROBLEM — Z00.00 ANNUAL PHYSICAL EXAM: Status: RESOLVED | Noted: 2019-12-06 | Resolved: 2024-06-05

## 2024-06-05 PROBLEM — Z09 HOSPITAL DISCHARGE FOLLOW-UP: Status: RESOLVED | Noted: 2021-05-06 | Resolved: 2024-06-05

## 2024-06-05 PROBLEM — R10.9 ABDOMINAL WALL PAIN IN RIGHT FLANK: Status: RESOLVED | Noted: 2021-01-05 | Resolved: 2024-06-05

## 2024-06-05 PROBLEM — L02.214 ABSCESS OF GROIN: Status: RESOLVED | Noted: 2022-02-03 | Resolved: 2024-06-05

## 2024-06-05 PROBLEM — J06.9 UPPER RESPIRATORY INFECTION: Status: RESOLVED | Noted: 2022-02-03 | Resolved: 2024-06-05

## 2024-06-05 PROCEDURE — 99204 OFFICE O/P NEW MOD 45 MIN: CPT | Mod: 95

## 2024-06-05 RX ORDER — LAMOTRIGINE 150 MG/1
150 TABLET ORAL DAILY
COMMUNITY
Start: 2024-05-24

## 2024-06-05 NOTE — ASSESSMENT & PLAN NOTE
Ongoing for 4 years now. Patient reports that when he becomes severe he becomes swollen and the pain is unbearable. His pain is in bilateral knees, left elbow and shoulder. He reports his symptoms are worse on the left. Triggers include increased physical activity. Improvement noted with prednisone. He reports that his joints get red, hot and stiff. Patient also reports low grade fevers with inflammation.    Latest Reference Range & Units 04/12/22 10:28 04/25/24 10:49   Rheumatoid Factor -Neph- 0 - 14 IU/mL <10    Stat C-Reactive Protein 0.00 - 0.75 mg/dL  <0.30

## 2024-06-05 NOTE — ASSESSMENT & PLAN NOTE
Chronic and stable on lamotrigine 150 mg.  Denies any fluctuations in mood, no depression. This is currently being prescribed by his Psychiatrist.

## 2024-06-05 NOTE — PROGRESS NOTES
Virtual Visit: New Patient   This visit was conducted via Zoom using secure and encrypted videoconferencing technology.   The patient was in their home in the [facility].    The patient's identity was confirmed and verbal consent was obtained for this virtual visit.     Subjective:     CC:   Chief Complaint   Patient presents with    [facility]    Joint Pain     Couple year getting worse       Robert Dahl is a 44 y.o. male presenting to establish care and to discuss the evaluation and management of:    Polyarthralgia   Ongoing for 4 years now. Patient reports that when he becomes severe he becomes swollen and the pain is unbearable. His pain is in bilateral knees, left elbow and shoulder. He reports his symptoms are worse on the left. Triggers include increased physical activity. Improvement noted with prednisone. He reports that his joints get red, hot and stiff. Patient also reports low grade fevers with inflammation.    Latest Reference Range & Units 04/12/22 10:28 04/25/24 10:49   Rheumatoid Factor -Neph- 0 - 14 IU/mL <10    Stat C-Reactive Protein 0.00 - 0.75 mg/dL  <0.30       Bipolar:  Chronic and stable on lamotrigine 150 mg.  Denies any fluctuations in mood, no depression. This is currently being prescribed by his Psychiatrist.        ROS  See HPI    Current medicines (including changes today)  Current Outpatient Medications   Medication Sig Dispense Refill    lamotrigine (LAMICTAL) 150 MG tablet Take 150 mg by mouth every day.      escitalopram (LEXAPRO) 20 MG tablet Take 1 Tablet by mouth every day. 60 Tablet 0    rosuvastatin (CRESTOR) 20 MG Tab Take 1 Tablet by mouth every evening. 30 Tablet 11    meloxicam (MOBIC) 7.5 MG Tab Take 1-2 Tablets by mouth every day. 60 Tablet 1    fluticasone (FLONASE) 50 MCG/ACT nasal spray fluticasone propionate 50 mcg/actuation nasal spray,suspension   Inhale 2 sprays every day by intranasal route.      vitamin D2, Ergocalciferol, (DRISDOL) 1.25 MG  (86936 UT) Cap capsule Take 1 Capsule by mouth every 7 days. 12 Capsule 4     No current facility-administered medications for this visit.       Patient Active Problem List    Diagnosis Date Noted    Bipolar 1 disorder (HCC) 08/24/2022    Polyarthralgia 04/05/2022    Right sided sciatica 04/05/2022    Duodenitis 02/03/2022    Hematemesis 02/03/2022    Lumbar sprain 02/03/2022    Peptic ulcer 02/03/2022    Urinary outflow obstruction 02/03/2022    Mixed anxiety and depressive disorder 02/03/2022    Pain in left leg 05/06/2021    Elevated fasting blood sugar 01/05/2021    High triglycerides 12/20/2019    Vitamin D deficiency 12/20/2019    DVT (deep venous thrombosis) ([facility]) 12/06/2019    Insomnia 12/06/2019    Anxiety and depression 12/06/2019    Multiple lung nodules on CT 12/06/2019        Objective:   There were no vitals taken for this visit.    Physical Exam:  Constitutional: Alert, no distress, well-groomed.  Skin: No rashes in visible areas.  Eye: Round. Conjunctiva clear, lids normal. No icterus.   ENMT: Lips pink without lesions, good dentition, moist mucous membranes. Phonation normal.  Neck: No masses, no thyromegaly. Moves freely without pain.  Respiratory: Unlabored respiratory effort, no cough or audible wheeze  Psych: Alert and oriented x3, normal affect and mood.     Assessment and Plan:   The following treatment plan was discussed:     1. Arthralgia, unspecified joint  Acute on chronic issue, etiology unsure prognosis uncertain.  Rheumatic factor and CRP previously negative however patient feels like his disease is progressing.  He did feel relief from prednisone when previously seen in the ER.  We discussed completing his labs and imaging his most concerning areas.  Will also look for other causes such as gout.  We discussed taking the naproxen previously prescribed in the meantime.  - ASHLY ANTIBODY WITH REFLEX; Future  - RHEUMATOID FACTOR QUANT; Future  - Sed Rate; Future  - CRP QUANTITIVE  (NON-CARDIAC); Future  - CCP ANTIBODIES, IGG/IGA; Future  - HLA-B27; Future  - URIC ACID; Future  - DX-FOOT-COMPLETE 3+ LEFT; Future  - DX-HIP-BILATERAL-WITH PELVIS-2 VIEWS; Future    2. PolyarthRalgia  - URIC ACID; Future    3. Screening for endocrine, nutritional, metabolic and immunity disorder  - Comp Metabolic Panel; Future  - CBC WITHOUT DIFFERENTIAL; Future  - HEMOGLOBIN A1C; Future  - Lipid Profile; Future  - TSH WITH REFLEX TO FT4; Future  - PROSTATE SPECIFIC AG DIAGNOSTIC; Future  - HEP C VIRUS ANTIBODY; Future  - HIV AG/AB COMBO ASSAY SCREENING; Future  - VITAMIN D,25 HYDROXY (DEFICIENCY); Future    4. Bipolar 1 disorder (HCC)  Chronic, well controlled with Lamictal 150 mg. Managed by psychiatry.  Follow-up precautions given.        Follow-up: No follow-ups on file.

## 2024-06-18 ENCOUNTER — HOSPITAL ENCOUNTER (OUTPATIENT)
Dept: RADIOLOGY | Facility: MEDICAL CENTER | Age: 44
End: 2024-06-18
Payer: MEDICAID

## 2024-06-18 DIAGNOSIS — M25.50 ARTHRALGIA, UNSPECIFIED JOINT: ICD-10-CM

## 2024-06-18 PROCEDURE — 73521 X-RAY EXAM HIPS BI 2 VIEWS: CPT

## 2024-06-18 PROCEDURE — 73630 X-RAY EXAM OF FOOT: CPT | Mod: LT

## 2024-07-01 ENCOUNTER — HOSPITAL ENCOUNTER (OUTPATIENT)
Dept: LAB | Facility: MEDICAL CENTER | Age: 44
End: 2024-07-01
Payer: MEDICAID

## 2024-07-01 DIAGNOSIS — Z13.21 SCREENING FOR ENDOCRINE, NUTRITIONAL, METABOLIC AND IMMUNITY DISORDER: ICD-10-CM

## 2024-07-01 DIAGNOSIS — M25.50 POLYARTHRALGIA: ICD-10-CM

## 2024-07-01 DIAGNOSIS — Z13.0 SCREENING FOR ENDOCRINE, NUTRITIONAL, METABOLIC AND IMMUNITY DISORDER: ICD-10-CM

## 2024-07-01 DIAGNOSIS — Z13.228 SCREENING FOR ENDOCRINE, NUTRITIONAL, METABOLIC AND IMMUNITY DISORDER: ICD-10-CM

## 2024-07-01 DIAGNOSIS — M25.50 ARTHRALGIA, UNSPECIFIED JOINT: ICD-10-CM

## 2024-07-01 DIAGNOSIS — Z13.29 SCREENING FOR ENDOCRINE, NUTRITIONAL, METABOLIC AND IMMUNITY DISORDER: ICD-10-CM

## 2024-07-01 LAB
25(OH)D3 SERPL-MCNC: 30 NG/ML (ref 30–100)
ALBUMIN SERPL BCP-MCNC: 4.3 G/DL (ref 3.2–4.9)
ALBUMIN/GLOB SERPL: 2 G/DL
ALP SERPL-CCNC: 90 U/L (ref 30–99)
ALT SERPL-CCNC: 17 U/L (ref 2–50)
ANION GAP SERPL CALC-SCNC: 11 MMOL/L (ref 7–16)
AST SERPL-CCNC: 19 U/L (ref 12–45)
BILIRUB SERPL-MCNC: 0.3 MG/DL (ref 0.1–1.5)
BUN SERPL-MCNC: 11 MG/DL (ref 8–22)
CALCIUM ALBUM COR SERPL-MCNC: 8.7 MG/DL (ref 8.5–10.5)
CALCIUM SERPL-MCNC: 8.9 MG/DL (ref 8.5–10.5)
CHLORIDE SERPL-SCNC: 104 MMOL/L (ref 96–112)
CHOLEST SERPL-MCNC: 195 MG/DL (ref 100–199)
CO2 SERPL-SCNC: 22 MMOL/L (ref 20–33)
CREAT SERPL-MCNC: 0.98 MG/DL (ref 0.5–1.4)
CRP SERPL HS-MCNC: <0.3 MG/DL (ref 0–0.75)
ERYTHROCYTE [DISTWIDTH] IN BLOOD BY AUTOMATED COUNT: 41.2 FL (ref 35.9–50)
ERYTHROCYTE [SEDIMENTATION RATE] IN BLOOD BY WESTERGREN METHOD: 5 MM/HOUR (ref 0–20)
EST. AVERAGE GLUCOSE BLD GHB EST-MCNC: 111 MG/DL
FASTING STATUS PATIENT QL REPORTED: NORMAL
GFR SERPLBLD CREATININE-BSD FMLA CKD-EPI: 98 ML/MIN/1.73 M 2
GLOBULIN SER CALC-MCNC: 2.2 G/DL (ref 1.9–3.5)
GLUCOSE SERPL-MCNC: 101 MG/DL (ref 65–99)
HBA1C MFR BLD: 5.5 % (ref 4–5.6)
HCT VFR BLD AUTO: 45.6 % (ref 42–52)
HCV AB SER QL: NORMAL
HDLC SERPL-MCNC: 27 MG/DL
HGB BLD-MCNC: 15.9 G/DL (ref 14–18)
HIV 1+2 AB+HIV1 P24 AG SERPL QL IA: NORMAL
LDLC SERPL CALC-MCNC: 124 MG/DL
MCH RBC QN AUTO: 31.7 PG (ref 27–33)
MCHC RBC AUTO-ENTMCNC: 34.9 G/DL (ref 32.3–36.5)
MCV RBC AUTO: 90.8 FL (ref 81.4–97.8)
PLATELET # BLD AUTO: 285 K/UL (ref 164–446)
PMV BLD AUTO: 9.5 FL (ref 9–12.9)
POTASSIUM SERPL-SCNC: 4.3 MMOL/L (ref 3.6–5.5)
PROT SERPL-MCNC: 6.5 G/DL (ref 6–8.2)
PSA SERPL-MCNC: 0.97 NG/ML (ref 0–4)
RBC # BLD AUTO: 5.02 M/UL (ref 4.7–6.1)
RHEUMATOID FACT SER IA-ACNC: <10 IU/ML (ref 0–14)
SODIUM SERPL-SCNC: 137 MMOL/L (ref 135–145)
TRIGL SERPL-MCNC: 218 MG/DL (ref 0–149)
TSH SERPL DL<=0.005 MIU/L-ACNC: 2.48 UIU/ML (ref 0.38–5.33)
URATE SERPL-MCNC: 5.8 MG/DL (ref 2.5–8.3)
WBC # BLD AUTO: 6.8 K/UL (ref 4.8–10.8)

## 2024-07-01 PROCEDURE — 84443 ASSAY THYROID STIM HORMONE: CPT

## 2024-07-01 PROCEDURE — 87389 HIV-1 AG W/HIV-1&-2 AB AG IA: CPT

## 2024-07-01 PROCEDURE — 86431 RHEUMATOID FACTOR QUANT: CPT

## 2024-07-01 PROCEDURE — 83036 HEMOGLOBIN GLYCOSYLATED A1C: CPT

## 2024-07-01 PROCEDURE — 85652 RBC SED RATE AUTOMATED: CPT

## 2024-07-01 PROCEDURE — 82306 VITAMIN D 25 HYDROXY: CPT

## 2024-07-01 PROCEDURE — 36415 COLL VENOUS BLD VENIPUNCTURE: CPT

## 2024-07-01 PROCEDURE — 84550 ASSAY OF BLOOD/URIC ACID: CPT

## 2024-07-01 PROCEDURE — 80053 COMPREHEN METABOLIC PANEL: CPT

## 2024-07-01 PROCEDURE — 86038 ANTINUCLEAR ANTIBODIES: CPT

## 2024-07-01 PROCEDURE — 86812 HLA TYPING A B OR C: CPT

## 2024-07-01 PROCEDURE — 86140 C-REACTIVE PROTEIN: CPT

## 2024-07-01 PROCEDURE — 85027 COMPLETE CBC AUTOMATED: CPT

## 2024-07-01 PROCEDURE — 86803 HEPATITIS C AB TEST: CPT

## 2024-07-01 PROCEDURE — 80061 LIPID PANEL: CPT

## 2024-07-01 PROCEDURE — 86200 CCP ANTIBODY: CPT

## 2024-07-01 PROCEDURE — 84153 ASSAY OF PSA TOTAL: CPT

## 2024-07-03 LAB
CCP IGA+IGG SERPL IA-ACNC: 3 UNITS (ref 0–19)
HLA-B27 QL FC: NEGATIVE
NUCLEAR IGG SER QL IA: NORMAL

## 2024-07-18 ENCOUNTER — TELEMEDICINE (OUTPATIENT)
Dept: MEDICAL GROUP | Facility: MEDICAL CENTER | Age: 44
End: 2024-07-18
Payer: MEDICAID

## 2024-07-18 VITALS — HEIGHT: 72 IN | WEIGHT: 194.3 LBS | BODY MASS INDEX: 26.32 KG/M2

## 2024-07-18 DIAGNOSIS — R06.02 SHORTNESS OF BREATH: ICD-10-CM

## 2024-07-18 DIAGNOSIS — R22.0 SWELLING OF FACE: ICD-10-CM

## 2024-07-18 DIAGNOSIS — E78.2 ELEVATED CHOLESTEROL WITH ELEVATED TRIGLYCERIDES: ICD-10-CM

## 2024-07-18 RX ORDER — ROSUVASTATIN CALCIUM 20 MG/1
20 TABLET, COATED ORAL EVERY EVENING
Qty: 30 TABLET | Refills: 3 | Status: SHIPPED | OUTPATIENT
Start: 2024-07-18

## 2024-07-18 ASSESSMENT — FIBROSIS 4 INDEX: FIB4 SCORE: 0.68

## 2024-07-18 ASSESSMENT — ENCOUNTER SYMPTOMS
SHORTNESS OF BREATH: 1
PALPITATIONS: 0
FEVER: 0
CHILLS: 0
COUGH: 0

## 2024-08-23 ENCOUNTER — APPOINTMENT (OUTPATIENT)
Dept: PHYSICAL MEDICINE AND REHAB | Facility: MEDICAL CENTER | Age: 44
End: 2024-08-23
Payer: MEDICAID

## 2024-09-03 ENCOUNTER — TELEPHONE (OUTPATIENT)
Dept: HEALTH INFORMATION MANAGEMENT | Facility: OTHER | Age: 44
End: 2024-09-03
Payer: MEDICAID

## 2024-09-18 ENCOUNTER — TELEPHONE (OUTPATIENT)
Dept: HEALTH INFORMATION MANAGEMENT | Facility: OTHER | Age: 44
End: 2024-09-18
Payer: MEDICAID

## 2024-12-20 DIAGNOSIS — E78.2 ELEVATED CHOLESTEROL WITH ELEVATED TRIGLYCERIDES: ICD-10-CM

## 2024-12-20 NOTE — TELEPHONE ENCOUNTER
Received request via: Pharmacy    Was the patient seen in the last year in this department? Yes    Does the patient have an active prescription (recently filled or refills available) for medication(s) requested? No    Pharmacy Name: [facility]    Does the patient have nursing home Plus and need 100-day supply? (This applies to ALL medications) Patient does not have SCP

## 2024-12-21 RX ORDER — ROSUVASTATIN CALCIUM 20 MG/1
20 TABLET, COATED ORAL EVERY EVENING
Qty: 30 TABLET | Refills: 3 | Status: SHIPPED | OUTPATIENT
Start: 2024-12-21

## 2025-04-18 ENCOUNTER — APPOINTMENT (OUTPATIENT)
Dept: SLEEP MEDICINE | Facility: MEDICAL CENTER | Age: 45
End: 2025-04-18
Payer: MEDICAID

## 2025-04-25 ENCOUNTER — APPOINTMENT (OUTPATIENT)
Dept: SLEEP MEDICINE | Facility: MEDICAL CENTER | Age: 45
End: 2025-04-25
Payer: MEDICAID

## 2025-04-30 ENCOUNTER — OFFICE VISIT (OUTPATIENT)
Dept: URGENT CARE | Facility: PHYSICIAN GROUP | Age: 45
End: 2025-04-30
Payer: MEDICAID

## 2025-04-30 ENCOUNTER — RESULTS FOLLOW-UP (OUTPATIENT)
Dept: URGENT CARE | Facility: PHYSICIAN GROUP | Age: 45
End: 2025-04-30

## 2025-04-30 VITALS
HEART RATE: 89 BPM | TEMPERATURE: 99.1 F | WEIGHT: 196.6 LBS | OXYGEN SATURATION: 98 % | BODY MASS INDEX: 26.66 KG/M2 | RESPIRATION RATE: 16 BRPM | SYSTOLIC BLOOD PRESSURE: 120 MMHG | DIASTOLIC BLOOD PRESSURE: 70 MMHG

## 2025-04-30 DIAGNOSIS — R50.9 FEVER, UNSPECIFIED FEVER CAUSE: ICD-10-CM

## 2025-04-30 DIAGNOSIS — R06.2 WHEEZING: ICD-10-CM

## 2025-04-30 DIAGNOSIS — R05.1 ACUTE COUGH: ICD-10-CM

## 2025-04-30 LAB
FLUAV RNA SPEC QL NAA+PROBE: NEGATIVE
FLUBV RNA SPEC QL NAA+PROBE: NEGATIVE
RSV RNA SPEC QL NAA+PROBE: NEGATIVE
SARS-COV-2 RNA RESP QL NAA+PROBE: NEGATIVE

## 2025-04-30 PROCEDURE — 94760 N-INVAS EAR/PLS OXIMETRY 1: CPT | Performed by: NURSE PRACTITIONER

## 2025-04-30 PROCEDURE — 3074F SYST BP LT 130 MM HG: CPT | Performed by: NURSE PRACTITIONER

## 2025-04-30 PROCEDURE — 94640 AIRWAY INHALATION TREATMENT: CPT | Performed by: NURSE PRACTITIONER

## 2025-04-30 PROCEDURE — 99215 OFFICE O/P EST HI 40 MIN: CPT | Mod: 25 | Performed by: NURSE PRACTITIONER

## 2025-04-30 PROCEDURE — 87637 SARSCOV2&INF A&B&RSV AMP PRB: CPT | Mod: QW | Performed by: NURSE PRACTITIONER

## 2025-04-30 PROCEDURE — 3078F DIAST BP <80 MM HG: CPT | Performed by: NURSE PRACTITIONER

## 2025-04-30 RX ORDER — ALBUTEROL SULFATE 0.83 MG/ML
2.5 SOLUTION RESPIRATORY (INHALATION) ONCE
Status: COMPLETED | OUTPATIENT
Start: 2025-04-30 | End: 2025-04-30

## 2025-04-30 RX ORDER — PREDNISONE 10 MG/1
40 TABLET ORAL DAILY
Qty: 20 TABLET | Refills: 0 | Status: SHIPPED | OUTPATIENT
Start: 2025-04-30 | End: 2025-05-05

## 2025-04-30 RX ORDER — ALBUTEROL SULFATE 0.83 MG/ML
2.5 SOLUTION RESPIRATORY (INHALATION) ONCE
Status: DISCONTINUED | OUTPATIENT
Start: 2025-04-30 | End: 2025-04-30

## 2025-04-30 RX ORDER — ALBUTEROL SULFATE 0.83 MG/ML
2.5 SOLUTION RESPIRATORY (INHALATION) EVERY 4 HOURS PRN
Qty: 100 EACH | Refills: 1 | Status: SHIPPED | OUTPATIENT
Start: 2025-04-30 | End: 2025-05-30

## 2025-04-30 RX ADMIN — ALBUTEROL SULFATE 2.5 MG: 0.83 SOLUTION RESPIRATORY (INHALATION) at 11:42

## 2025-04-30 ASSESSMENT — FIBROSIS 4 INDEX: FIB4 SCORE: 0.7

## 2025-04-30 NOTE — PROGRESS NOTES
Subjective:   Robert Dahl is a 45 y.o. male who presents for Nasal Congestion (Sore throat, fever, chills,body aches, coughing, wheezing, congestion, headaches, X 7 days )      Patient is a 45-year-old male presenting clinic today reporting approximately 1 month history of waxing and waning cough, shortness of breath, and wheezing.  He states he has been worked up at the [facility] emergency department had chest x-ray performed approximately 30 days ago and everything came back normal.  Patient states over the past 7 days he feels like the dry cough has worsened, increasing wheezing, and for the past 3 days he has had bodyaches, chills, headache, nasal congestion, scratchy throat, fevers, and fatigue.  Patient has been taking Promethazine DM, prednisone 20 mg once daily for 3 days, Mucinex, and over-the-counter cough/flu medication however states symptoms are not improving.  Patient denies any phlegm production.  He has not had any chest pain, palpitations, ear pain or pressure, or abdominal symptoms.  Patient was referred to a pulmonologist however has had to reschedule appointments and has not been able to be evaluated at this time.  Review of medical records indicate multiple lung nodes on CT back in 2019 of left lobe measuring 4.3 and 5 mm which were not calcified.  Patient was a previous smoker half a pack a day for 5 years quit smoking in 2014.      Medications, Allergies, and current problem list reviewed today in Epic.     Objective:     /70   Pulse 89   Temp 37.3 °C (99.1 °F) (Temporal)   Resp 16   Wt 89.2 kg (196 lb 9.6 oz)   SpO2 98%     Physical Exam  Vitals reviewed.   Constitutional:       General: He is not in acute distress.     Appearance: Normal appearance. He is not ill-appearing, toxic-appearing or diaphoretic.   HENT:      Head: Normocephalic.      Right Ear: Tympanic membrane, ear canal and external ear normal.      Left Ear: Tympanic membrane, ear canal and external ear  normal.      Nose: Rhinorrhea present.      Mouth/Throat:      Mouth: Mucous membranes are moist.      Pharynx: Oropharynx is clear. No oropharyngeal exudate or posterior oropharyngeal erythema.   Eyes:      Extraocular Movements: Extraocular movements intact.      Conjunctiva/sclera: Conjunctivae normal.      Pupils: Pupils are equal, round, and reactive to light.   Cardiovascular:      Rate and Rhythm: Normal rate and regular rhythm.   Pulmonary:      Effort: Pulmonary effort is normal. No respiratory distress.      Breath sounds: No stridor. Examination of the left-upper field reveals wheezing. Examination of the left-lower field reveals wheezing. Wheezing and rhonchi present. No decreased breath sounds or rales.   Musculoskeletal:         General: Normal range of motion.      Cervical back: Normal range of motion and neck supple. No tenderness.   Lymphadenopathy:      Cervical: No cervical adenopathy.   Skin:     General: Skin is warm and dry.   Neurological:      General: No focal deficit present.      Mental Status: He is alert and oriented to person, place, and time.   Psychiatric:         Mood and Affect: Mood normal.         Behavior: Behavior normal.         Assessment/Plan:     Diagnosis and associated orders:     1. Fever, unspecified fever cause  POCT CEPHEID COV-2, FLU A/B, RSV - PCR      2. Acute cough  DX-CHEST-2 VIEWS    CANCELED: DX-CHEST-2 VIEWS    CANCELED: DX-CHEST-2 VIEWS    CANCELED: DX-CHEST-2 VIEWS      3. Wheezing  albuterol (Proventil) 2.5mg/3ml nebulizer solution 2.5 mg    predniSONE (DELTASONE) 10 MG Tab    albuterol (PROVENTIL) 2.5mg/3ml Nebu Soln solution for nebulization    DISCONTINUED: albuterol (Proventil) 2.5mg/3ml nebulizer solution 2.5 mg         Comments/MDM:     In office nebulizer treatment administered:    Pre treatment: HR 89 RR 16 SpO2 98; lung sounds:  wheezing in left with rhonchi    Post treatment: HR 79 RR 16 SpO2 96; lung sounds: Wheezing improved, still present.   Mild rhonchi present.    32953 CPT code    1805-chest x-ray from [facility] indicates no evidence of acute pulmonary disease.  No signs of focal consolidation indicating pneumonia.  Did call patient and discussed x-ray results.  All questions were answered.  Patient states he has been able to schedule appointment for Friday to follow-up with pulmonology.  At this time recommended continue with plan of care as discussed in clinic.  Patient was agreeable.  Did reiterate ER precautions.      Patient was involved with shared decision-making throughout the exam today and verbalizes understanding regards to plan of care, discharge instructions, and follow-up         Differential diagnosis, natural history, supportive care, and indications for immediate follow-up discussed.    Advised the patient to follow-up with the primary care physician for recheck, reevaluation, and consideration of further management.    I personally reviewed prior external notes and test results pertinent to today's visit as well as additional imaging and testing completed in clinic today.     Please note that this dictation was created using voice recognition software. I have made a reasonable attempt to correct obvious errors, but I expect that there are errors of grammar and possibly content that I did not discover before finalizing the note.       understanding regards to plan of care, discharge instructions, and follow-up.       I have spent 42 minutes on the care of Robert aDhl.  This includes preparing for the urgent care visit. This time includes review of previous visits/ documents, obtaining HPI, examination and evaluation of patient, ordering and interpretation of labs, imaging, tests, medical management, counseling, education and documentation.               Differential diagnosis, natural history, supportive care, and indications for immediate follow-up discussed.    Advised the patient to follow-up with the primary care physician for recheck, reevaluation, and consideration of further management.    I personally reviewed prior external notes and test results pertinent to today's visit as well as additional imaging and testing completed in clinic today.     Please note that this dictation was created using voice recognition software. I have made a reasonable attempt to correct obvious errors, but I expect that there are errors of grammar and possibly content that I did not discover before finalizing the note.

## 2025-05-02 ENCOUNTER — APPOINTMENT (OUTPATIENT)
Dept: SLEEP MEDICINE | Facility: MEDICAL CENTER | Age: 45
End: 2025-05-02
Payer: MEDICAID

## 2025-05-02 VITALS — BODY MASS INDEX: 26.28 KG/M2 | HEIGHT: 72 IN | WEIGHT: 194 LBS

## 2025-05-02 DIAGNOSIS — R06.02 SHORTNESS OF BREATH: ICD-10-CM

## 2025-05-02 PROCEDURE — 94726 PLETHYSMOGRAPHY LUNG VOLUMES: CPT

## 2025-05-02 PROCEDURE — 94729 DIFFUSING CAPACITY: CPT

## 2025-05-02 PROCEDURE — 94060 EVALUATION OF WHEEZING: CPT

## 2025-05-02 ASSESSMENT — FIBROSIS 4 INDEX: FIB4 SCORE: 0.7

## 2025-05-02 NOTE — PROCEDURES
Technician: RIANA Pagan    Technician Comment:  Good patient effort & cooperation.  The results of this test meet the ATS/ERS standards for acceptability & reproducibility.  Test was performed on the [employer] Body Plethysmograph-Elite DX system.  Predicted values were GLI-2012 for spirometry, GLI-2020 for DLCO, ITS for Lung Volumes.  The DLCO was uncorrected for Hgb.  A bronchodilator of Albuterol HFA -2puffs via spacer administered.      Interpretation:  FVC 4.87 L, Z-score -0.84  FEV1 4.12 L, Z-score -0.38  FEV1/FVC 85%, Z-score 0.87  TLC 7.47 L, Z-score 0.29  RV 2.52 L, Z-score 1.44  DLCO 37.80 mL/min/mmHg, Z-score 1.19  Flow-volume loop: Normal    Spirometry is normal and shows no airflow obstruction.  No bronchodilator response is observed.  Lung volumes are normal.  Gas exchange is normal.    Conclusion: normal pulmonary function tests    Manuel Landry MD  Pulmonary and Critical Care Medicine  [facility]

## 2025-05-06 ENCOUNTER — RESULTS FOLLOW-UP (OUTPATIENT)
Dept: MEDICAL GROUP | Facility: MEDICAL CENTER | Age: 45
End: 2025-05-06

## 2025-05-31 SDOH — ECONOMIC STABILITY: FOOD INSECURITY: WITHIN THE PAST 12 MONTHS, YOU WORRIED THAT YOUR FOOD WOULD RUN OUT BEFORE YOU GOT MONEY TO BUY MORE.: PATIENT DECLINED

## 2025-05-31 SDOH — ECONOMIC STABILITY: FOOD INSECURITY: WITHIN THE PAST 12 MONTHS, THE FOOD YOU BOUGHT JUST DIDN'T LAST AND YOU DIDN'T HAVE MONEY TO GET MORE.: PATIENT DECLINED

## 2025-05-31 SDOH — ECONOMIC STABILITY: INCOME INSECURITY: IN THE LAST 12 MONTHS, WAS THERE A TIME WHEN YOU WERE NOT ABLE TO PAY THE MORTGAGE OR RENT ON TIME?: PATIENT DECLINED

## 2025-05-31 SDOH — HEALTH STABILITY: PHYSICAL HEALTH: ON AVERAGE, HOW MANY DAYS PER WEEK DO YOU ENGAGE IN MODERATE TO STRENUOUS EXERCISE (LIKE A BRISK WALK)?: 7 DAYS

## 2025-05-31 SDOH — ECONOMIC STABILITY: TRANSPORTATION INSECURITY
IN THE PAST 12 MONTHS, HAS LACK OF TRANSPORTATION KEPT YOU FROM MEETINGS, WORK, OR FROM GETTING THINGS NEEDED FOR DAILY LIVING?: PATIENT DECLINED

## 2025-05-31 SDOH — HEALTH STABILITY: PHYSICAL HEALTH: ON AVERAGE, HOW MANY MINUTES DO YOU ENGAGE IN EXERCISE AT THIS LEVEL?: 90 MIN

## 2025-05-31 SDOH — ECONOMIC STABILITY: INCOME INSECURITY: HOW HARD IS IT FOR YOU TO PAY FOR THE VERY BASICS LIKE FOOD, HOUSING, MEDICAL CARE, AND HEATING?: PATIENT DECLINED

## 2025-05-31 SDOH — HEALTH STABILITY: MENTAL HEALTH
STRESS IS WHEN SOMEONE FEELS TENSE, NERVOUS, ANXIOUS, OR CAN'T SLEEP AT NIGHT BECAUSE THEIR MIND IS TROUBLED. HOW STRESSED ARE YOU?: NOT AT ALL

## 2025-05-31 SDOH — ECONOMIC STABILITY: TRANSPORTATION INSECURITY
IN THE PAST 12 MONTHS, HAS THE LACK OF TRANSPORTATION KEPT YOU FROM MEDICAL APPOINTMENTS OR FROM GETTING MEDICATIONS?: PATIENT DECLINED

## 2025-05-31 SDOH — ECONOMIC STABILITY: HOUSING INSECURITY
IN THE LAST 12 MONTHS, WAS THERE A TIME WHEN YOU DID NOT HAVE A STEADY PLACE TO SLEEP OR SLEPT IN A SHELTER (INCLUDING NOW)?: PATIENT DECLINED

## 2025-05-31 SDOH — ECONOMIC STABILITY: TRANSPORTATION INSECURITY
IN THE PAST 12 MONTHS, HAS LACK OF RELIABLE TRANSPORTATION KEPT YOU FROM MEDICAL APPOINTMENTS, MEETINGS, WORK OR FROM GETTING THINGS NEEDED FOR DAILY LIVING?: PATIENT DECLINED

## 2025-05-31 ASSESSMENT — SOCIAL DETERMINANTS OF HEALTH (SDOH)
IN THE PAST 12 MONTHS, HAS THE ELECTRIC, GAS, OIL, OR WATER COMPANY THREATENED TO SHUT OFF SERVICE IN YOUR HOME?: PATIENT DECLINED
HOW OFTEN DO YOU ATTEND CHURCH OR RELIGIOUS SERVICES?: PATIENT DECLINED
HOW OFTEN DO YOU GET TOGETHER WITH FRIENDS OR RELATIVES?: PATIENT DECLINED
HOW HARD IS IT FOR YOU TO PAY FOR THE VERY BASICS LIKE FOOD, HOUSING, MEDICAL CARE, AND HEATING?: PATIENT DECLINED
IN A TYPICAL WEEK, HOW MANY TIMES DO YOU TALK ON THE PHONE WITH FAMILY, FRIENDS, OR NEIGHBORS?: PATIENT DECLINED
HOW OFTEN DO YOU HAVE A DRINK CONTAINING ALCOHOL: NEVER
IN A TYPICAL WEEK, HOW MANY TIMES DO YOU TALK ON THE PHONE WITH FAMILY, FRIENDS, OR NEIGHBORS?: PATIENT DECLINED
DO YOU BELONG TO ANY CLUBS OR ORGANIZATIONS SUCH AS CHURCH GROUPS UNIONS, FRATERNAL OR ATHLETIC GROUPS, OR SCHOOL GROUPS?: PATIENT DECLINED
HOW OFTEN DO YOU ATTENT MEETINGS OF THE CLUB OR ORGANIZATION YOU BELONG TO?: PATIENT DECLINED
HOW OFTEN DO YOU ATTENT MEETINGS OF THE CLUB OR ORGANIZATION YOU BELONG TO?: PATIENT DECLINED
HOW OFTEN DO YOU ATTEND CHURCH OR RELIGIOUS SERVICES?: PATIENT DECLINED
IN A TYPICAL WEEK, HOW MANY TIMES DO YOU TALK ON THE PHONE WITH FAMILY, FRIENDS, OR NEIGHBORS?: PATIENT DECLINED
DO YOU BELONG TO ANY CLUBS OR ORGANIZATIONS SUCH AS CHURCH GROUPS UNIONS, FRATERNAL OR ATHLETIC GROUPS, OR SCHOOL GROUPS?: PATIENT DECLINED
HOW MANY DRINKS CONTAINING ALCOHOL DO YOU HAVE ON A TYPICAL DAY WHEN YOU ARE DRINKING: PATIENT DOES NOT DRINK
WITHIN THE PAST 12 MONTHS, YOU WORRIED THAT YOUR FOOD WOULD RUN OUT BEFORE YOU GOT THE MONEY TO BUY MORE: PATIENT DECLINED
HOW OFTEN DO YOU ATTEND CHURCH OR RELIGIOUS SERVICES?: PATIENT DECLINED
HOW OFTEN DO YOU GET TOGETHER WITH FRIENDS OR RELATIVES?: PATIENT DECLINED
HOW OFTEN DO YOU HAVE SIX OR MORE DRINKS ON ONE OCCASION: NEVER
HOW OFTEN DO YOU ATTENT MEETINGS OF THE CLUB OR ORGANIZATION YOU BELONG TO?: PATIENT DECLINED
DO YOU BELONG TO ANY CLUBS OR ORGANIZATIONS SUCH AS CHURCH GROUPS UNIONS, FRATERNAL OR ATHLETIC GROUPS, OR SCHOOL GROUPS?: PATIENT DECLINED
IN THE PAST 12 MONTHS, HAS THE ELECTRIC, GAS, OIL, OR WATER COMPANY THREATENED TO SHUT OFF SERVICE IN YOUR HOME?: PATIENT DECLINED
HOW OFTEN DO YOU GET TOGETHER WITH FRIENDS OR RELATIVES?: PATIENT DECLINED

## 2025-05-31 ASSESSMENT — LIFESTYLE VARIABLES
HOW OFTEN DO YOU HAVE A DRINK CONTAINING ALCOHOL: NEVER
HOW MANY STANDARD DRINKS CONTAINING ALCOHOL DO YOU HAVE ON A TYPICAL DAY: PATIENT DOES NOT DRINK
HOW OFTEN DO YOU HAVE SIX OR MORE DRINKS ON ONE OCCASION: NEVER
AUDIT-C TOTAL SCORE: 0
AUDIT-C TOTAL SCORE: 0
SKIP TO QUESTIONS 9-10: 1
HOW OFTEN DO YOU HAVE SIX OR MORE DRINKS ON ONE OCCASION: NEVER
HOW MANY STANDARD DRINKS CONTAINING ALCOHOL DO YOU HAVE ON A TYPICAL DAY: PATIENT DOES NOT DRINK
HOW OFTEN DO YOU HAVE A DRINK CONTAINING ALCOHOL: NEVER
SKIP TO QUESTIONS 9-10: 1

## 2025-06-03 ENCOUNTER — APPOINTMENT (OUTPATIENT)
Dept: MEDICAL GROUP | Facility: CLINIC | Age: 45
End: 2025-06-03
Payer: MEDICAID

## 2025-06-15 SDOH — HEALTH STABILITY: PHYSICAL HEALTH: ON AVERAGE, HOW MANY DAYS PER WEEK DO YOU ENGAGE IN MODERATE TO STRENUOUS EXERCISE (LIKE A BRISK WALK)?: 7 DAYS

## 2025-06-15 SDOH — ECONOMIC STABILITY: INCOME INSECURITY: IN THE LAST 12 MONTHS, WAS THERE A TIME WHEN YOU WERE NOT ABLE TO PAY THE MORTGAGE OR RENT ON TIME?: PATIENT DECLINED

## 2025-06-15 SDOH — ECONOMIC STABILITY: FOOD INSECURITY: WITHIN THE PAST 12 MONTHS, YOU WORRIED THAT YOUR FOOD WOULD RUN OUT BEFORE YOU GOT MONEY TO BUY MORE.: PATIENT DECLINED

## 2025-06-15 SDOH — HEALTH STABILITY: PHYSICAL HEALTH: ON AVERAGE, HOW MANY MINUTES DO YOU ENGAGE IN EXERCISE AT THIS LEVEL?: 90 MIN

## 2025-06-15 SDOH — ECONOMIC STABILITY: FOOD INSECURITY: WITHIN THE PAST 12 MONTHS, THE FOOD YOU BOUGHT JUST DIDN'T LAST AND YOU DIDN'T HAVE MONEY TO GET MORE.: PATIENT DECLINED

## 2025-06-15 SDOH — ECONOMIC STABILITY: INCOME INSECURITY: HOW HARD IS IT FOR YOU TO PAY FOR THE VERY BASICS LIKE FOOD, HOUSING, MEDICAL CARE, AND HEATING?: PATIENT DECLINED

## 2025-06-15 ASSESSMENT — SOCIAL DETERMINANTS OF HEALTH (SDOH)
DO YOU BELONG TO ANY CLUBS OR ORGANIZATIONS SUCH AS CHURCH GROUPS UNIONS, FRATERNAL OR ATHLETIC GROUPS, OR SCHOOL GROUPS?: PATIENT DECLINED
HOW OFTEN DO YOU GET TOGETHER WITH FRIENDS OR RELATIVES?: PATIENT DECLINED
IN A TYPICAL WEEK, HOW MANY TIMES DO YOU TALK ON THE PHONE WITH FAMILY, FRIENDS, OR NEIGHBORS?: PATIENT DECLINED
IN THE PAST 12 MONTHS, HAS THE ELECTRIC, GAS, OIL, OR WATER COMPANY THREATENED TO SHUT OFF SERVICE IN YOUR HOME?: PATIENT DECLINED
HOW OFTEN DO YOU ATTEND CHURCH OR RELIGIOUS SERVICES?: PATIENT DECLINED
IN A TYPICAL WEEK, HOW MANY TIMES DO YOU TALK ON THE PHONE WITH FAMILY, FRIENDS, OR NEIGHBORS?: PATIENT DECLINED
HOW HARD IS IT FOR YOU TO PAY FOR THE VERY BASICS LIKE FOOD, HOUSING, MEDICAL CARE, AND HEATING?: PATIENT DECLINED
HOW OFTEN DO YOU ATTEND CHURCH OR RELIGIOUS SERVICES?: PATIENT DECLINED
HOW OFTEN DO YOU HAVE SIX OR MORE DRINKS ON ONE OCCASION: NEVER
DO YOU BELONG TO ANY CLUBS OR ORGANIZATIONS SUCH AS CHURCH GROUPS UNIONS, FRATERNAL OR ATHLETIC GROUPS, OR SCHOOL GROUPS?: PATIENT DECLINED
HOW OFTEN DO YOU ATTENT MEETINGS OF THE CLUB OR ORGANIZATION YOU BELONG TO?: PATIENT DECLINED
WITHIN THE PAST 12 MONTHS, YOU WORRIED THAT YOUR FOOD WOULD RUN OUT BEFORE YOU GOT THE MONEY TO BUY MORE: PATIENT DECLINED
HOW OFTEN DO YOU HAVE A DRINK CONTAINING ALCOHOL: NEVER
HOW OFTEN DO YOU GET TOGETHER WITH FRIENDS OR RELATIVES?: PATIENT DECLINED
HOW OFTEN DO YOU ATTENT MEETINGS OF THE CLUB OR ORGANIZATION YOU BELONG TO?: PATIENT DECLINED
HOW MANY DRINKS CONTAINING ALCOHOL DO YOU HAVE ON A TYPICAL DAY WHEN YOU ARE DRINKING: PATIENT DOES NOT DRINK

## 2025-06-15 ASSESSMENT — LIFESTYLE VARIABLES
SKIP TO QUESTIONS 9-10: 1
AUDIT-C TOTAL SCORE: 0
HOW OFTEN DO YOU HAVE SIX OR MORE DRINKS ON ONE OCCASION: NEVER
HOW MANY STANDARD DRINKS CONTAINING ALCOHOL DO YOU HAVE ON A TYPICAL DAY: PATIENT DOES NOT DRINK
HOW OFTEN DO YOU HAVE A DRINK CONTAINING ALCOHOL: NEVER

## 2025-06-16 ENCOUNTER — OFFICE VISIT (OUTPATIENT)
Dept: MEDICAL GROUP | Facility: CLINIC | Age: 45
End: 2025-06-16
Payer: MEDICAID

## 2025-06-16 VITALS
HEART RATE: 83 BPM | SYSTOLIC BLOOD PRESSURE: 151 MMHG | WEIGHT: 199 LBS | RESPIRATION RATE: 16 BRPM | OXYGEN SATURATION: 96 % | BODY MASS INDEX: 27.37 KG/M2 | DIASTOLIC BLOOD PRESSURE: 96 MMHG

## 2025-06-16 DIAGNOSIS — Z13.220 LIPID SCREENING: ICD-10-CM

## 2025-06-16 DIAGNOSIS — T78.40XA ALLERGY, INITIAL ENCOUNTER: ICD-10-CM

## 2025-06-16 DIAGNOSIS — E78.2 ELEVATED CHOLESTEROL WITH ELEVATED TRIGLYCERIDES: Primary | ICD-10-CM

## 2025-06-16 DIAGNOSIS — M25.50 POLYARTHRALGIA: ICD-10-CM

## 2025-06-16 DIAGNOSIS — F41.9 ANXIETY AND DEPRESSION: ICD-10-CM

## 2025-06-16 DIAGNOSIS — Z13.1 DIABETES MELLITUS SCREENING: ICD-10-CM

## 2025-06-16 DIAGNOSIS — R91.8 MULTIPLE LUNG NODULES ON CT: ICD-10-CM

## 2025-06-16 DIAGNOSIS — F32.A ANXIETY AND DEPRESSION: ICD-10-CM

## 2025-06-16 DIAGNOSIS — R06.02 SHORTNESS OF BREATH: ICD-10-CM

## 2025-06-16 PROCEDURE — 3080F DIAST BP >= 90 MM HG: CPT | Performed by: FAMILY MEDICINE

## 2025-06-16 PROCEDURE — 3077F SYST BP >= 140 MM HG: CPT | Performed by: FAMILY MEDICINE

## 2025-06-16 PROCEDURE — 99204 OFFICE O/P NEW MOD 45 MIN: CPT | Performed by: FAMILY MEDICINE

## 2025-06-16 RX ORDER — PROPRANOLOL HYDROCHLORIDE 10 MG/1
10 TABLET ORAL 2 TIMES DAILY
COMMUNITY
Start: 2025-05-29

## 2025-06-16 RX ORDER — LISDEXAMFETAMINE DIMESYLATE 40 MG/1
1 CAPSULE ORAL DAILY
COMMUNITY
Start: 2025-05-29

## 2025-06-16 RX ORDER — ALBUTEROL SULFATE 0.83 MG/ML
2.5 SOLUTION RESPIRATORY (INHALATION) DAILY
COMMUNITY
Start: 2025-05-26

## 2025-06-16 RX ORDER — NAPROXEN 500 MG/1
500 TABLET ORAL DAILY
COMMUNITY

## 2025-06-16 ASSESSMENT — FIBROSIS 4 INDEX: FIB4 SCORE: 0.7

## 2025-06-16 NOTE — ASSESSMENT & PLAN NOTE
I am not sure what to think of his symptoms as it is hard to link all of those into a common problem.  He did have a large workup for rheumatologic disease as well as basic labs that were all unremarkable.  He would like to repeat some of that and so I did order some repeat blood work.  He is not aware of any overt Allergies but thinks this is a possibility.  He has never been allergy tested but apparently that does run in his family.  It would be unusual for an allergic reaction to cause the symptoms but there is swelling associated with shortness of breath and mucus production that occurs.  I did refer to an allergist to can consider allergy testing.

## 2025-06-16 NOTE — PROGRESS NOTES
Subjective:   CC:      HPI:     Problem   Shortness of Breath    The patient describes an unusual myriad of symptoms.  These consist of shortness of breath, sometimes sinus symptoms and the production of green mucus,  Pleurisy type pain, inflammation of joints and some swelling of his face periodically.  He states this has been going on and off for quite some time.  He had a fairly large workup about 1 year ago for rheumatologic disease and other causes and everything was normal.  He did see pulmonary medicine more recently and had pulmonary function testing done that was completely normal.  He continues to use the word inflammation to describe his symptoms and means this in the context of generally nothing feeling well, some swelling and stiffness of his joints.  He is unable to describe any known triggers.  He does suffer from an anxiety disorder but states he does not believe that anxiety is causing his physical symptoms.  Interestingly, he also states that naproxen seems to really take good care of his symptoms.  Usually he will stay on it for several days and begin to feel better and when he gets off of it the symptoms may return unpredictably.     Anxiety and Depression    He is under the care of a psychiatrist for this as well as ADHD.     Multiple Lung Nodules On CT    He comes in requesting following a lung nodule seen on a CAT scan in 2024.  I see that multiple lung nodules as listed in his problem list but the scan review showed one 7 mm nodule.         Current Medications and Prescriptions Ordered in Epic[1]      ROS:  Gen: Occasional fever when he gets symptoms.  Pulm: Intermittent shortness of breath when he gets his symptoms.  No cough  CV: no chest pain, no palpitations  GI: no nausea/vomiting, no diarrhea        Objective:     Exam:  BP (!) 151/96 (BP Location: Left arm, Patient Position: Sitting, BP Cuff Size: Adult)   Pulse 83   Resp 16   Wt 90.3 kg (199 lb)   SpO2 96%   BMI 27.37 kg/m²  Body  mass index is 27.37 kg/m².    Gen: Alert and oriented, No apparent distress.  Neck: Neck is supple without lymphadenopathy.  Lungs: Normal effort, CTA bilaterally, no wheezes, rhonchi, or rales  CV: Regular rate and rhythm. No murmurs, rubs, or gallops.  Ext: No edema.  Abdomen: Soft and nontender with no masses or hepatosplenomegaly.    Assessment & Plan:     45 y.o. male with the following -     Problem List Items Addressed This Visit       Anxiety and depression    He does not believe that anxiety is causing his symptoms and that may not be.  I did ask him to mention to his psychiatrist the workups that he has undergone for his systemic symptoms so they may entertain that as a possibility.         Multiple lung nodules on CT    I ordered a repeat CAT scan to follow this nodule as the patient says it has not been repeated and it has been more than 1 year.         Relevant Orders    CT-CHEST (THORAX) W/O    Polyarthralgia    Relevant Orders    CBC WITH DIFFERENTIAL    Comp Metabolic Panel    TSH WITH REFLEX TO FT4    Elevated cholesterol with elevated triglycerides - Primary    Relevant Medications    propranolol (INDERAL) 10 MG Tab    Other Relevant Orders    Lipid Profile    Shortness of breath    I am not sure what to think of his symptoms as it is hard to link all of those into a common problem.  He did have a large workup for rheumatologic disease as well as basic labs that were all unremarkable.  He would like to repeat some of that and so I did order some repeat blood work.  He is not aware of any overt Allergies but thinks this is a possibility.  He has never been allergy tested but apparently that does run in his family.  It would be unusual for an allergic reaction to cause the symptoms but there is swelling associated with shortness of breath and mucus production that occurs.  I did refer to an allergist to can consider allergy testing.          Other Visit Diagnoses         Diabetes mellitus screening         Relevant Orders    HEMOGLOBIN A1C      Lipid screening        Relevant Orders    Lipid Profile      Allergy, initial encounter        Relevant Orders    Referral to Allergy                  Return in about 1 month (around 7/16/2025).               [1]   Current Outpatient Medications Ordered in Epic   Medication Sig Dispense Refill    albuterol (PROVENTIL) 2.5mg/3ml Nebu Soln solution for nebulization Take 2.5 mg by nebulization every day.      VYVANSE 40 MG Cap Take 1 Capsule by mouth every day.      propranolol (INDERAL) 10 MG Tab Take 10 mg by mouth 2 times a day.      naproxen (NAPROSYN) 500 MG Tab Take 500 mg by mouth every day.       No current [facility]-ordered facility-administered medications on file.

## 2025-06-16 NOTE — ASSESSMENT & PLAN NOTE
I ordered a repeat CAT scan to follow this nodule as the patient says it has not been repeated and it has been more than 1 year.

## 2025-06-16 NOTE — ASSESSMENT & PLAN NOTE
He does not believe that anxiety is causing his symptoms and that may not be.  I did ask him to mention to his psychiatrist the workups that he has undergone for his systemic symptoms so they may entertain that as a possibility.

## 2025-06-20 NOTE — Clinical Note
REFERRAL APPROVAL NOTICE         Sent on June 20, 2025                   Facundo Dahl  [address]  [place] [address]                   Dear Mr. Dahl,    After a careful review of the medical information and benefit coverage, [facility] has processed your referral. See below for additional details.    If applicable, you must be actively enrolled with your insurance for coverage of the authorized service. If you have any questions regarding your coverage, please contact your insurance directly.    REFERRAL INFORMATION   Referral #:  [id number]  Referred-To Provider    Referred-By Provider:  Allergy and Immunology    Brad Mcbride M.D.   [facility]      [address]  [place] [address]  [phone] [address]  [place] [address]  [phone]    Referral Start Date:  06/16/2025  Referral End Date:   06/16/2026             SCHEDULING  If you do not already have an appointment, please call [phone] to make an appointment.     MORE INFORMATION  If you do not already have a [facility] account, sign up at: [email or web address]  You can access your medical information, make appointments, see lab results, billing information, and more.  If you have questions regarding this referral, please contact  the [facility] Referrals department at:             [phone]. Monday - Friday 8:00AM - 5:00PM.     Sincerely,    [facility]

## 2025-07-07 ENCOUNTER — APPOINTMENT (OUTPATIENT)
Dept: RADIOLOGY | Facility: MEDICAL CENTER | Age: 45
End: 2025-07-07
Attending: FAMILY MEDICINE
Payer: MEDICAID

## 2025-07-11 ENCOUNTER — HOSPITAL ENCOUNTER (OUTPATIENT)
Dept: RADIOLOGY | Facility: MEDICAL CENTER | Age: 45
End: 2025-07-11
Attending: FAMILY MEDICINE
Payer: MEDICAID

## 2025-07-11 DIAGNOSIS — R91.8 MULTIPLE LUNG NODULES ON CT: ICD-10-CM

## 2025-07-11 PROCEDURE — 71250 CT THORAX DX C-: CPT

## 2025-08-18 ENCOUNTER — HOSPITAL ENCOUNTER (OUTPATIENT)
Dept: LAB | Facility: MEDICAL CENTER | Age: 45
End: 2025-08-18
Attending: FAMILY MEDICINE
Payer: MEDICAID

## 2025-08-18 DIAGNOSIS — Z13.220 LIPID SCREENING: ICD-10-CM

## 2025-08-18 DIAGNOSIS — Z13.1 DIABETES MELLITUS SCREENING: ICD-10-CM

## 2025-08-18 DIAGNOSIS — E78.2 ELEVATED CHOLESTEROL WITH ELEVATED TRIGLYCERIDES: ICD-10-CM

## 2025-08-18 DIAGNOSIS — M25.50 POLYARTHRALGIA: ICD-10-CM

## 2025-08-18 LAB
ALBUMIN SERPL BCP-MCNC: 4.3 G/DL (ref 3.2–4.9)
ALBUMIN/GLOB SERPL: 2 G/DL
ALP SERPL-CCNC: 77 U/L (ref 30–99)
ALT SERPL-CCNC: 18 U/L (ref 2–50)
ANION GAP SERPL CALC-SCNC: 11 MMOL/L (ref 7–16)
AST SERPL-CCNC: 23 U/L (ref 12–45)
BASOPHILS # BLD AUTO: 0.5 % (ref 0–1.8)
BASOPHILS # BLD: 0.03 K/UL (ref 0–0.12)
BILIRUB SERPL-MCNC: 0.4 MG/DL (ref 0.1–1.5)
BUN SERPL-MCNC: 10 MG/DL (ref 8–22)
CALCIUM ALBUM COR SERPL-MCNC: 9 MG/DL (ref 8.5–10.5)
CALCIUM SERPL-MCNC: 9.2 MG/DL (ref 8.5–10.5)
CHLORIDE SERPL-SCNC: 107 MMOL/L (ref 96–112)
CHOLEST SERPL-MCNC: 174 MG/DL (ref 100–199)
CO2 SERPL-SCNC: 24 MMOL/L (ref 20–33)
CREAT SERPL-MCNC: 1.14 MG/DL (ref 0.5–1.4)
EOSINOPHIL # BLD AUTO: 0.13 K/UL (ref 0–0.51)
EOSINOPHIL NFR BLD: 2.2 % (ref 0–6.9)
ERYTHROCYTE [DISTWIDTH] IN BLOOD BY AUTOMATED COUNT: 41 FL (ref 35.9–50)
EST. AVERAGE GLUCOSE BLD GHB EST-MCNC: 111 MG/DL
FASTING STATUS PATIENT QL REPORTED: NORMAL
GFR SERPLBLD CREATININE-BSD FMLA CKD-EPI: 82 ML/MIN/1.73 M 2
GLOBULIN SER CALC-MCNC: 2.1 G/DL (ref 1.9–3.5)
GLUCOSE SERPL-MCNC: 107 MG/DL (ref 65–99)
HBA1C MFR BLD: 5.5 % (ref 4–5.6)
HCT VFR BLD AUTO: 46.1 % (ref 42–52)
HDLC SERPL-MCNC: 33 MG/DL
HGB BLD-MCNC: 15.7 G/DL (ref 14–18)
IMM GRANULOCYTES # BLD AUTO: 0.01 K/UL (ref 0–0.11)
IMM GRANULOCYTES NFR BLD AUTO: 0.2 % (ref 0–0.9)
LDLC SERPL CALC-MCNC: 119 MG/DL
LYMPHOCYTES # BLD AUTO: 1.84 K/UL (ref 1–4.8)
LYMPHOCYTES NFR BLD: 30.8 % (ref 22–41)
MCH RBC QN AUTO: 31.2 PG (ref 27–33)
MCHC RBC AUTO-ENTMCNC: 34.1 G/DL (ref 32.3–36.5)
MCV RBC AUTO: 91.7 FL (ref 81.4–97.8)
MONOCYTES # BLD AUTO: 0.59 K/UL (ref 0–0.85)
MONOCYTES NFR BLD AUTO: 9.9 % (ref 0–13.4)
NEUTROPHILS # BLD AUTO: 3.38 K/UL (ref 1.82–7.42)
NEUTROPHILS NFR BLD: 56.4 % (ref 44–72)
NRBC # BLD AUTO: 0 K/UL
NRBC BLD-RTO: 0 /100 WBC (ref 0–0.2)
PLATELET # BLD AUTO: 286 K/UL (ref 164–446)
PMV BLD AUTO: 9.9 FL (ref 9–12.9)
POTASSIUM SERPL-SCNC: 3.9 MMOL/L (ref 3.6–5.5)
PROT SERPL-MCNC: 6.4 G/DL (ref 6–8.2)
RBC # BLD AUTO: 5.03 M/UL (ref 4.7–6.1)
SODIUM SERPL-SCNC: 142 MMOL/L (ref 135–145)
TRIGL SERPL-MCNC: 111 MG/DL (ref 0–149)
TSH SERPL DL<=0.005 MIU/L-ACNC: 1.5 UIU/ML (ref 0.38–5.33)
WBC # BLD AUTO: 6 K/UL (ref 4.8–10.8)

## 2025-08-18 PROCEDURE — 84443 ASSAY THYROID STIM HORMONE: CPT

## 2025-08-18 PROCEDURE — 83036 HEMOGLOBIN GLYCOSYLATED A1C: CPT

## 2025-08-18 PROCEDURE — 36415 COLL VENOUS BLD VENIPUNCTURE: CPT

## 2025-08-18 PROCEDURE — 80053 COMPREHEN METABOLIC PANEL: CPT

## 2025-08-18 PROCEDURE — 85025 COMPLETE CBC W/AUTO DIFF WBC: CPT

## 2025-08-18 PROCEDURE — 80061 LIPID PANEL: CPT

## (undated) DEVICE — SYSTEM CLEARIFY VISUALIZATION (10EA/PK)

## (undated) DEVICE — SUTURE 0 VICRYL PLUS UR-6 - 27 INCH (36/BX)

## (undated) DEVICE — KIT ANESTHESIA W/CIRCUIT & 3/LT BAG W/FILTER (20EA/CA)

## (undated) DEVICE — SUCTION INSTRUMENT YANKAUER BULBOUS TIP W/O VENT (50EA/CA)

## (undated) DEVICE — SENSOR SPO2 NEO LNCS ADHESIVE (20/BX) SEE USER NOTES

## (undated) DEVICE — TUBING CLEARLINK DUO-VENT - C-FLO (48EA/CA)

## (undated) DEVICE — GLOVE SZ 7.5 BIOGEL PI MICRO - PF LF (50PR/BX)

## (undated) DEVICE — KIT  I.V. START (100EA/CA)

## (undated) DEVICE — SUTURE GENERAL

## (undated) DEVICE — DERMABOND ADVANCED - (12EA/BX)

## (undated) DEVICE — BLADE SURGICAL #15 - (50/BX 3BX/CA)

## (undated) DEVICE — TUBE NG SALEM SUMP 16FR (50EA/CA)

## (undated) DEVICE — ELECTRODE DUAL RETURN W/ CORD - (50/PK)

## (undated) DEVICE — TROCAR LAPSCP 100MM 12MM NTHRD - (6/BX)

## (undated) DEVICE — SET LEADWIRE 5 LEAD BEDSIDE DISPOSABLE ECG (1SET OF 5/EA)

## (undated) DEVICE — PROTECTOR ULNA NERVE - (36PR/CA)

## (undated) DEVICE — CATHETER IV 20 GA X 1-1/4 ---SURG.& SDS ONLY--- (50EA/BX)

## (undated) DEVICE — TUBE CONNECTING SUCTION - CLEAR PLASTIC STERILE 72 IN (50EA/CA)

## (undated) DEVICE — TROCAR 5X100 NON BLADED Z-TH - READ KII (6/BX)

## (undated) DEVICE — TOWEL STOP TIMEOUT SAFETY FLAG (40EA/CA)

## (undated) DEVICE — CANISTER SUCTION RIGID RED 1500CC (40EA/CA)

## (undated) DEVICE — CANNULA W/SEAL 5X100 Z-THRE - ADED KII (12/BX)

## (undated) DEVICE — SLEEVE, VASO, THIGH, MED

## (undated) DEVICE — LACTATED RINGERS INJ 1000 ML - (14EA/CA 60CA/PF)

## (undated) DEVICE — CHLORAPREP 26 ML APPLICATOR - ORANGE TINT(25/CA)

## (undated) DEVICE — BAG RETRIEVAL 10ML (10EA/BX)

## (undated) DEVICE — SUTURE 4-0 MONOCRYL PLUS PS-2 - 27 INCH (36/BX)

## (undated) DEVICE — DRAPESURG STERI-DRAPE LONG - (10/BX 4BX/CA)

## (undated) DEVICE — HEAD HOLDER JUNIOR/ADULT

## (undated) DEVICE — MANIFOLD NEPTUNE 1 PORT (20/PK)

## (undated) DEVICE — GLOVE BIOGEL INDICATOR SZ 8 SURGICAL PF LTX - (50/BX 4BX/CA)

## (undated) DEVICE — APPLIER 5MM MED/LARGE CLIP - (3/BX)

## (undated) DEVICE — MASK ANESTHESIA ADULT  - (100/CA)

## (undated) DEVICE — GOWN WARMING STANDARD FLEX - (30/CA)

## (undated) DEVICE — SODIUM CHL IRRIGATION 0.9% 1000ML (12EA/CA)

## (undated) DEVICE — CANISTER SUCTION 3000ML MECHANICAL FILTER AUTO SHUTOFF MEDI-VAC NONSTERILE LF DISP  (40EA/CA)

## (undated) DEVICE — SET SUCTION/IRRIGATION WITH DISPOSABLE TIP (6/CA )PART #0250-070-520 IS A SUB